# Patient Record
Sex: MALE | Race: WHITE | NOT HISPANIC OR LATINO | Employment: UNEMPLOYED | URBAN - METROPOLITAN AREA
[De-identification: names, ages, dates, MRNs, and addresses within clinical notes are randomized per-mention and may not be internally consistent; named-entity substitution may affect disease eponyms.]

---

## 2017-10-16 ENCOUNTER — HOSPITAL ENCOUNTER (EMERGENCY)
Facility: HOSPITAL | Age: 27
Discharge: HOME | End: 2017-10-16
Attending: EMERGENCY MEDICINE
Payer: MEDICAID

## 2017-10-16 VITALS
DIASTOLIC BLOOD PRESSURE: 71 MMHG | SYSTOLIC BLOOD PRESSURE: 126 MMHG | RESPIRATION RATE: 18 BRPM | HEART RATE: 88 BPM | OXYGEN SATURATION: 98 % | BODY MASS INDEX: 20.82 KG/M2 | WEIGHT: 141 LBS | TEMPERATURE: 97 F

## 2017-10-16 DIAGNOSIS — K08.89 TOOTHACHE: Primary | ICD-10-CM

## 2017-10-16 DIAGNOSIS — K04.7 DENTAL ABSCESS: ICD-10-CM

## 2017-10-16 PROCEDURE — 99283 EMERGENCY DEPT VISIT LOW MDM: CPT

## 2017-10-16 RX ORDER — NAPROXEN 375 MG/1
375 TABLET ORAL 2 TIMES DAILY WITH MEALS
Qty: 30 TABLET | Refills: 0 | Status: SHIPPED | OUTPATIENT
Start: 2017-10-16 | End: 2017-12-26

## 2017-10-16 RX ORDER — CLINDAMYCIN HYDROCHLORIDE 150 MG/1
450 CAPSULE ORAL EVERY 8 HOURS
Qty: 45 CAPSULE | Refills: 0 | Status: SHIPPED | OUTPATIENT
Start: 2017-10-16 | End: 2017-10-21

## 2017-10-16 NOTE — ED PROVIDER NOTES
"Encounter Date: 10/16/2017       History     Chief Complaint   Patient presents with    Dental Pain     bottom left "abscess" reported per patient, pt reports "i need some antibiotics and this dentist office told me to come to the emergency room because this is the only place he can get antibiotics today"      The history is provided by the patient.   Dental Pain   The primary symptoms include mouth pain. Primary symptoms do not include oral bleeding, fever, shortness of breath, sore throat or angioedema. The symptoms began several weeks ago. The symptoms are unchanged. The symptoms are chronic. The symptoms occur constantly.   Affected locations include: teeth and gum(s).   Additional symptoms include: gum swelling. Additional symptoms do not include: trismus and facial swelling.     Review of patient's allergies indicates:   Allergen Reactions    Ceclor [cefaclor]     Cefixime     Cefzil [cefprozil]      Past Medical History:   Diagnosis Date    Asthma      History reviewed. No pertinent surgical history.  History reviewed. No pertinent family history.  Social History   Substance Use Topics    Smoking status: Current Every Day Smoker     Packs/day: 1.00     Types: Cigarettes    Smokeless tobacco: Never Used    Alcohol use Yes      Comment: seldom     Review of Systems   Constitutional: Negative for fever.   HENT: Negative for facial swelling and sore throat.    Respiratory: Negative for shortness of breath.    Cardiovascular: Negative for chest pain.   Gastrointestinal: Negative for nausea.   Genitourinary: Negative for dysuria.   Musculoskeletal: Negative for back pain.   Skin: Negative for rash.   Neurological: Negative for weakness.   Hematological: Does not bruise/bleed easily.       Physical Exam     Initial Vitals [10/16/17 1426]   BP Pulse Resp Temp SpO2   126/71 88 18 97.4 °F (36.3 °C) 98 %      MAP       89.33         Physical Exam    Nursing note and vitals reviewed.  Constitutional: He appears " well-developed and well-nourished. No distress.   HENT:   Head: Normocephalic and atraumatic.   Mouth/Throat: Oropharynx is clear and moist. Dental abscesses and dental caries present.       Multiple missing teeth, poor dentition.    Eyes: Conjunctivae and EOM are normal. Pupils are equal, round, and reactive to light.   Neck: Normal range of motion. Neck supple.   Cardiovascular: Normal rate, regular rhythm and normal heart sounds. Exam reveals no gallop and no friction rub.    No murmur heard.  Pulmonary/Chest: Breath sounds normal. No respiratory distress. He has no wheezes. He has no rhonchi. He has no rales.   Abdominal: Soft. Bowel sounds are normal. He exhibits no distension and no mass. There is no tenderness. There is no rebound and no guarding.   Musculoskeletal: Normal range of motion. He exhibits no edema.   Neurological: He is alert and oriented to person, place, and time. He has normal strength.   Skin: Skin is warm and dry. No rash noted.   Psychiatric: He has a normal mood and affect. Thought content normal.         ED Course   Procedures  Labs Reviewed - No data to display                            ED Course      Clinical Impression:       ICD-10-CM ICD-9-CM   1. Toothache K08.89 525.9   2. Dental abscess K04.7 522.5         Disposition:   Disposition: Discharged  Condition: Stable                        Je Foote MD  10/16/17 9202

## 2017-11-09 ENCOUNTER — HOSPITAL ENCOUNTER (EMERGENCY)
Facility: HOSPITAL | Age: 27
Discharge: HOME | End: 2017-11-09
Payer: MEDICAID

## 2017-11-09 VITALS
RESPIRATION RATE: 18 BRPM | DIASTOLIC BLOOD PRESSURE: 71 MMHG | HEART RATE: 76 BPM | HEIGHT: 68 IN | SYSTOLIC BLOOD PRESSURE: 129 MMHG | OXYGEN SATURATION: 99 % | BODY MASS INDEX: 21.64 KG/M2 | WEIGHT: 142.81 LBS | TEMPERATURE: 98 F

## 2017-11-09 DIAGNOSIS — L08.9 SKIN INFECTION: ICD-10-CM

## 2017-11-09 DIAGNOSIS — L73.9 FOLLICULITIS: Primary | ICD-10-CM

## 2017-11-09 PROCEDURE — 99283 EMERGENCY DEPT VISIT LOW MDM: CPT

## 2017-11-09 RX ORDER — MUPIROCIN 20 MG/G
OINTMENT TOPICAL 3 TIMES DAILY
Qty: 1 TUBE | Refills: 0 | Status: SHIPPED | OUTPATIENT
Start: 2017-11-09 | End: 2017-12-26

## 2017-11-09 NOTE — ED PROVIDER NOTES
Encounter Date: 11/9/2017       History     Chief Complaint   Patient presents with    Abscess     pt reports 3 abscess that needs to be checked, right arm, 2 abdominal, all with ss drainage and open approx 3 days PTA,     Pt presents to ED complaining of abscesses to abdomen and R forearm which onset gradually over the past 2 weeks. Pt states that he was painting and sweating all day and that night he noticed bumps on his skin. He states that the areas look better but wants and antibiotic to get it out of his system. Symptoms are constant and moderate in severity. No mitigating or exacerbating factors reported. Associated sxs include skin irritation and draining, which have improved. Patient denies any fever, and all other sxs at this time. No further complaints or concerns at this time.             Review of patient's allergies indicates:   Allergen Reactions    Ceclor [cefaclor]     Cefixime     Cefzil [cefprozil]      Past Medical History:   Diagnosis Date    Asthma      History reviewed. No pertinent surgical history.  History reviewed. No pertinent family history.  Social History   Substance Use Topics    Smoking status: Current Every Day Smoker     Packs/day: 1.00     Types: Cigarettes    Smokeless tobacco: Former User    Alcohol use Yes      Comment: seldom     Review of Systems   Constitutional: Negative for fever.   HENT: Negative for sore throat.    Respiratory: Negative for shortness of breath.    Cardiovascular: Negative for chest pain.   Gastrointestinal: Negative for nausea.   Genitourinary: Negative for dysuria.   Musculoskeletal: Negative for back pain.   Skin: Negative for rash.        Positive for multiple abscesses    Neurological: Negative for weakness.   Hematological: Does not bruise/bleed easily.   All other systems reviewed and are negative.      Physical Exam     Initial Vitals [11/09/17 1119]   BP Pulse Resp Temp SpO2   129/71 76 18 98.4 °F (36.9 °C) 99 %      MAP       90.33          Physical Exam  Vital signs and nursing notes reviewed.  Constitutional: Patient is in no acute distress. Awake and alert. Well-developed and well-nourished.  Head: Atraumatic. Normocephalic.  Eyes: PERRL. EOM intact. Conjunctivae nl. No scleral icterus.  ENT: Mucous membranes are moist. Oropharynx is clear.  Neck: Supple. Full ROM. No lymphadenopathy.  Cardiovascular: Regular rate and rhythm. No murmurs, rubs, or gallops. Distal pulses are 2+ and symmetric .  Pulmonary/Chest: No respiratory distress. Clear to auscultation bilaterally. No wheezing, rales, or rhonchi.  Abdominal: Soft. Non-distended. No TTP. No rebound, guarding, or rigidity. Good bowel sounds.  Genitourinary: No CVA tenderness  Musculoskeletal: Moves all extremities. No edema. No calf tenderness.  Skin: Warm and dry. 2 small areas approx 1 cm of erythema and scaling to lower R abdomen, no induration or fluctuance and a 2.5 cm area or erythema with no iduration to R forearm, no fluctaunce  Neurological: Awake and alert. No acute focal neurological deficits are appreciated.  Psychiatric: Normal affect. Good eye contact. Appropriate in content.    ED Course   Procedures  Labs Reviewed - No data to display                            ED Course    12:12 PM:  Discussed with pt all pertinent ED information and results. Discussed pt dx of follicultis and plan of tx. Gave pt all f/u and return to the ED instructions. All questions and concerns were addressed at this time. Educated patient on the importance of using antibacterial soap when showering. Pt expresses understanding of information and instructions, and is comfortable with plan to discharge. Pt is stable for discharge.      Clinical Impression:   The primary encounter diagnosis was Folliculitis. A diagnosis of Skin infection was also pertinent to this visit.                           Arpit Lau Jr., FNP  11/09/17 8921

## 2017-12-26 ENCOUNTER — HOSPITAL ENCOUNTER (EMERGENCY)
Facility: HOSPITAL | Age: 27
Discharge: HOME | End: 2017-12-26
Attending: EMERGENCY MEDICINE
Payer: MEDICAID

## 2017-12-26 VITALS
SYSTOLIC BLOOD PRESSURE: 135 MMHG | RESPIRATION RATE: 16 BRPM | BODY MASS INDEX: 22.81 KG/M2 | OXYGEN SATURATION: 98 % | HEART RATE: 70 BPM | WEIGHT: 154 LBS | DIASTOLIC BLOOD PRESSURE: 63 MMHG | HEIGHT: 69 IN | TEMPERATURE: 99 F

## 2017-12-26 DIAGNOSIS — K08.89 PAIN, DENTAL: Primary | ICD-10-CM

## 2017-12-26 PROCEDURE — 99283 EMERGENCY DEPT VISIT LOW MDM: CPT

## 2017-12-26 RX ORDER — NAPROXEN 500 MG/1
500 TABLET ORAL 2 TIMES DAILY WITH MEALS
Qty: 20 TABLET | Refills: 0 | Status: SHIPPED | OUTPATIENT
Start: 2017-12-26 | End: 2019-06-18

## 2017-12-26 RX ORDER — TRAMADOL HYDROCHLORIDE 50 MG/1
50 TABLET ORAL EVERY 6 HOURS PRN
Qty: 12 TABLET | Refills: 0 | Status: SHIPPED | OUTPATIENT
Start: 2017-12-26 | End: 2019-06-18

## 2017-12-26 RX ORDER — CLINDAMYCIN HYDROCHLORIDE 150 MG/1
300 CAPSULE ORAL 4 TIMES DAILY
Qty: 56 CAPSULE | Refills: 0 | Status: SHIPPED | OUTPATIENT
Start: 2017-12-26 | End: 2018-01-02

## 2017-12-28 NOTE — ED PROVIDER NOTES
"Encounter Date: 12/26/2017       History     Chief Complaint   Patient presents with    Dental Problem     Pt states, "I have an abscess in my jaw line that I need to get antibiotics for." L Lower jaw     The history is provided by the patient.   Dental Pain   The primary symptoms include mouth pain and dental injury. Primary symptoms do not include oral bleeding, oral lesions, headaches, fever, shortness of breath, sore throat or angioedema. The symptoms began several days ago. The symptoms are worsening. The symptoms are recurrent. The symptoms occur intermittently.   Mouth pain began 3 - 5 days ago. Mouth pain occurs intermittently.   Affected teeth include: 19/left lower first molar. The injury is a fracture.   Additional symptoms include: dental sensitivity to temperature, gum tenderness, jaw pain and facial swelling. Additional symptoms do not include: gum swelling, purulent gums, trismus, trouble swallowing, pain with swallowing, excessive salivation and drooling. Medical issues include: smoking.     Has f/u c dentist but pain getting worse and he cannot get to dentist today.      Review of patient's allergies indicates:   Allergen Reactions    Ceclor [cefaclor]     Cefixime     Cefzil [cefprozil]      Past Medical History:   Diagnosis Date    Asthma      Past Surgical History:   Procedure Laterality Date    NO PAST SURGERIES       History reviewed. No pertinent family history.  Social History   Substance Use Topics    Smoking status: Current Every Day Smoker     Packs/day: 1.00     Types: Cigarettes    Smokeless tobacco: Former User    Alcohol use Yes      Comment: seldom     Review of Systems   Constitutional: Negative for fever.   HENT: Positive for dental problem and facial swelling. Negative for drooling, sore throat and trouble swallowing.    Respiratory: Negative for shortness of breath.    Cardiovascular: Negative for chest pain.   Gastrointestinal: Negative for nausea.   Genitourinary: " Negative for dysuria.   Musculoskeletal: Negative for back pain.   Skin: Negative for rash.   Neurological: Negative for weakness and headaches.   Hematological: Does not bruise/bleed easily.   All other systems reviewed and are negative.      Physical Exam     Initial Vitals [12/26/17 1354]   BP Pulse Resp Temp SpO2   135/63 70 16 98.7 °F (37.1 °C) 98 %      MAP       87         Physical Exam    Nursing note and vitals reviewed.  Constitutional: He appears well-developed and well-nourished.   HENT:   Head: Normocephalic and atraumatic.       Mouth/Throat: Oropharynx is clear and moist. Dental abscesses and dental caries present. No uvula swelling.       Eyes: EOM are normal. Pupils are equal, round, and reactive to light.   Neck: Normal range of motion. Neck supple.   Cardiovascular: Normal rate, regular rhythm, normal heart sounds and intact distal pulses.   Pulmonary/Chest: Breath sounds normal. No respiratory distress. He has no wheezes. He has no rhonchi.   Abdominal: Soft. Bowel sounds are normal. There is no rebound.   Musculoskeletal: Normal range of motion. He exhibits no edema.   Neurological: He is alert and oriented to person, place, and time. He has normal strength. No cranial nerve deficit or sensory deficit.   Skin: Skin is warm and dry. No rash noted.   Psychiatric: He has a normal mood and affect. His behavior is normal. Judgment and thought content normal.         ED Course   Procedures  Labs Reviewed - No data to display                            ED Course      Clinical Impression:   The encounter diagnosis was Pain, dental.    Disposition:   Disposition: Discharged  Condition: Stable                        Clovis Bourne MD  12/28/17 6424

## 2019-06-18 ENCOUNTER — HOSPITAL ENCOUNTER (EMERGENCY)
Facility: HOSPITAL | Age: 29
Discharge: HOME | End: 2019-06-18
Attending: EMERGENCY MEDICINE
Payer: MEDICAID

## 2019-06-18 VITALS
TEMPERATURE: 101 F | HEART RATE: 94 BPM | SYSTOLIC BLOOD PRESSURE: 126 MMHG | RESPIRATION RATE: 16 BRPM | OXYGEN SATURATION: 100 % | DIASTOLIC BLOOD PRESSURE: 69 MMHG | WEIGHT: 160 LBS | HEIGHT: 69 IN | BODY MASS INDEX: 23.7 KG/M2

## 2019-06-18 DIAGNOSIS — R50.9 FEVER, UNSPECIFIED FEVER CAUSE: ICD-10-CM

## 2019-06-18 DIAGNOSIS — M62.830 BACK SPASM: ICD-10-CM

## 2019-06-18 DIAGNOSIS — R05.9 COUGH: Primary | ICD-10-CM

## 2019-06-18 PROCEDURE — 96372 THER/PROPH/DIAG INJ SC/IM: CPT | Mod: ER

## 2019-06-18 PROCEDURE — 63600175 PHARM REV CODE 636 W HCPCS: Mod: ER | Performed by: EMERGENCY MEDICINE

## 2019-06-18 PROCEDURE — 99284 EMERGENCY DEPT VISIT MOD MDM: CPT | Mod: 25,ER

## 2019-06-18 RX ORDER — ORPHENADRINE CITRATE 30 MG/ML
60 INJECTION INTRAMUSCULAR; INTRAVENOUS
Status: COMPLETED | OUTPATIENT
Start: 2019-06-18 | End: 2019-06-18

## 2019-06-18 RX ORDER — NAPROXEN 375 MG/1
375 TABLET ORAL 2 TIMES DAILY WITH MEALS
Qty: 30 TABLET | Refills: 0 | Status: SHIPPED | OUTPATIENT
Start: 2019-06-18 | End: 2019-07-15

## 2019-06-18 RX ORDER — CYCLOBENZAPRINE HCL 10 MG
10 TABLET ORAL 3 TIMES DAILY PRN
Qty: 15 TABLET | Refills: 0 | Status: SHIPPED | OUTPATIENT
Start: 2019-06-18 | End: 2019-06-23

## 2019-06-18 RX ORDER — KETOROLAC TROMETHAMINE 30 MG/ML
15 INJECTION, SOLUTION INTRAMUSCULAR; INTRAVENOUS
Status: COMPLETED | OUTPATIENT
Start: 2019-06-18 | End: 2019-06-18

## 2019-06-18 RX ADMIN — ORPHENADRINE CITRATE 60 MG: 30 INJECTION INTRAMUSCULAR; INTRAVENOUS at 07:06

## 2019-06-18 RX ADMIN — KETOROLAC TROMETHAMINE 15 MG: 30 INJECTION, SOLUTION INTRAMUSCULAR; INTRAVENOUS at 07:06

## 2019-06-18 NOTE — ED PROVIDER NOTES
Encounter Date: 6/18/2019       History     Chief Complaint   Patient presents with    Back Pain     The history is provided by the patient.   Back Pain    This is a new problem. The current episode started yesterday. The problem occurs constantly. The problem has been unchanged. The pain is associated with lifting heavy objects. The pain is present in the thoracic spine. The quality of the pain is described as stabbing. The pain does not radiate. The symptoms are aggravated by bending, twisting and certain positions. Associated symptoms include a fever. Pertinent negatives include no chest pain, no bladder incontinence, no dysuria, no paresthesias, no paresis, no tingling and no weakness. He has tried nothing for the symptoms.   Cough   The current episode started yesterday. The problem has been unchanged. The cough is productive of sputum. The maximum temperature recorded prior to his arrival was 101 - 101.9 F. Pertinent negatives include no chest pain, no sore throat and no shortness of breath.     Review of patient's allergies indicates:   Allergen Reactions    Ceclor [cefaclor]     Cefixime     Cefzil [cefprozil]      Past Medical History:   Diagnosis Date    Asthma      Past Surgical History:   Procedure Laterality Date    NO PAST SURGERIES       History reviewed. No pertinent family history.  Social History     Tobacco Use    Smoking status: Current Every Day Smoker     Packs/day: 1.00     Types: Cigarettes    Smokeless tobacco: Former User   Substance Use Topics    Alcohol use: Yes     Comment: seldom    Drug use: No     Review of Systems   Constitutional: Positive for fever.   HENT: Negative for sore throat.    Respiratory: Positive for cough. Negative for shortness of breath.    Cardiovascular: Negative for chest pain.   Gastrointestinal: Negative for nausea.   Genitourinary: Negative for bladder incontinence and dysuria.   Musculoskeletal: Positive for back pain.   Skin: Negative for rash.    Neurological: Negative for tingling, weakness and paresthesias.   Hematological: Does not bruise/bleed easily.       Physical Exam     Initial Vitals [06/18/19 0733]   BP Pulse Resp Temp SpO2   125/70 97 18 (!) 101 °F (38.3 °C) 99 %      MAP       --         Physical Exam    Nursing note and vitals reviewed.  Constitutional: He appears well-developed and well-nourished. No distress.   HENT:   Head: Normocephalic and atraumatic.   Mouth/Throat: Oropharynx is clear and moist.   Eyes: Conjunctivae and EOM are normal. Pupils are equal, round, and reactive to light.   Neck: Normal range of motion. Neck supple.   Cardiovascular: Normal rate, regular rhythm and normal heart sounds. Exam reveals no gallop and no friction rub.    No murmur heard.  Pulmonary/Chest: Breath sounds normal. No respiratory distress. He has no wheezes. He has no rhonchi. He has no rales.   Abdominal: Soft. Bowel sounds are normal. He exhibits no distension and no mass. There is no tenderness. There is no rebound and no guarding.   Musculoskeletal: Normal range of motion. He exhibits no edema.        Thoracic back: He exhibits spasm (Right paraspinal). He exhibits no tenderness and no bony tenderness.   Neurological: He is alert and oriented to person, place, and time. He has normal strength.   Skin: Skin is warm and dry. No rash noted.   Psychiatric: He has a normal mood and affect. Thought content normal.         ED Course   Procedures  Labs Reviewed - No data to display       Imaging Results          X-Ray Chest PA And Lateral (Final result)  Result time 06/18/19 08:21:13    Final result by Mc Cerrato MD (06/18/19 08:21:13)                 Impression:      No acute findings.      Electronically signed by: Mc Cerrato MD  Date:    06/18/2019  Time:    08:21             Narrative:    EXAMINATION:  XR CHEST PA AND LATERAL    CLINICAL HISTORY:  cough;    TECHNIQUE:  PA and lateral views of the chest were  performed.    COMPARISON:  None    FINDINGS:  The cardiac and mediastinal silhouettes appear within normal limits.   The lungs are clear bilaterally.  No acute osseous findings demonstrated.                                         8:30 AM: Reassessed pt at this time.  Pt states his condition has improved at this time. Pain has resolved.  C/T/L spine is nontender.  Discussed with pt all pertinent ED information and results. Discussed pt dx of cough, fever and plan of tx.  Gave pt all f/u and return to the ED instructions. All questions and concerns were addressed at this time. Pt expresses understanding of information and instructions, and is comfortable with plan to discharge. Pt is stable for discharge.                  Clinical Impression:       ICD-10-CM ICD-9-CM   1. Cough R05 786.2   2. Fever, unspecified fever cause R50.9 780.60   3. Back spasm M62.830 724.8           Disposition:   Disposition: Discharged  Condition: Stable                        Je Foote MD  06/18/19 0834

## 2019-07-05 ENCOUNTER — HOSPITAL ENCOUNTER (EMERGENCY)
Facility: HOSPITAL | Age: 29
Discharge: HOME | End: 2019-07-05
Attending: EMERGENCY MEDICINE
Payer: MEDICAID

## 2019-07-05 VITALS
BODY MASS INDEX: 23.02 KG/M2 | SYSTOLIC BLOOD PRESSURE: 145 MMHG | HEART RATE: 96 BPM | TEMPERATURE: 98 F | HEIGHT: 68 IN | RESPIRATION RATE: 19 BRPM | WEIGHT: 151.88 LBS | OXYGEN SATURATION: 98 % | DIASTOLIC BLOOD PRESSURE: 77 MMHG

## 2019-07-05 DIAGNOSIS — M54.6 ACUTE RIGHT-SIDED THORACIC BACK PAIN: Primary | ICD-10-CM

## 2019-07-05 PROCEDURE — 99284 EMERGENCY DEPT VISIT MOD MDM: CPT | Mod: 25,ER

## 2019-07-05 PROCEDURE — 63600175 PHARM REV CODE 636 W HCPCS: Mod: ER | Performed by: EMERGENCY MEDICINE

## 2019-07-05 PROCEDURE — 96372 THER/PROPH/DIAG INJ SC/IM: CPT | Mod: ER

## 2019-07-05 RX ORDER — METHOCARBAMOL 500 MG/1
1000 TABLET, FILM COATED ORAL 3 TIMES DAILY
Qty: 30 TABLET | Refills: 0 | Status: SHIPPED | OUTPATIENT
Start: 2019-07-05 | End: 2019-07-10

## 2019-07-05 RX ORDER — DEXAMETHASONE SODIUM PHOSPHATE 4 MG/ML
4 INJECTION, SOLUTION INTRA-ARTICULAR; INTRALESIONAL; INTRAMUSCULAR; INTRAVENOUS; SOFT TISSUE
Status: COMPLETED | OUTPATIENT
Start: 2019-07-05 | End: 2019-07-05

## 2019-07-05 RX ORDER — TRAMADOL HYDROCHLORIDE 50 MG/1
50 TABLET ORAL EVERY 6 HOURS PRN
Qty: 20 TABLET | Refills: 0 | Status: SHIPPED | OUTPATIENT
Start: 2019-07-05 | End: 2019-07-15

## 2019-07-05 RX ORDER — CYCLOBENZAPRINE HCL 10 MG
10 TABLET ORAL 3 TIMES DAILY PRN
COMMUNITY
End: 2019-07-15

## 2019-07-05 RX ADMIN — DEXAMETHASONE SODIUM PHOSPHATE 4 MG: 4 INJECTION, SOLUTION INTRAMUSCULAR; INTRAVENOUS at 10:07

## 2019-07-05 NOTE — ED PROVIDER NOTES
Encounter Date: 7/5/2019       History     Chief Complaint   Patient presents with    Back Pain     upper R got knocked over by dog. saw pcp 2 days ago for same.     The history is provided by the patient.   Back Pain    This is a recurrent problem. The current episode started several weeks ago. The problem occurs daily. The problem has been unchanged. The pain is associated with lifting heavy objects. The pain is present in the thoracic spine. The quality of the pain is described as aching. The pain does not radiate. The pain is at a severity of 4/10. Pertinent negatives include no chest pain, no fever, no numbness, no weight loss, no headaches, no abdominal pain, no abdominal swelling, no bowel incontinence, no perianal numbness, no bladder incontinence, no dysuria, no pelvic pain, no leg pain, no paresthesias, no paresis, no tingling and no weakness. He has tried nothing for the symptoms.     Review of patient's allergies indicates:   Allergen Reactions    Ceclor [cefaclor]     Cefixime     Cefzil [cefprozil]      Past Medical History:   Diagnosis Date    Asthma      Past Surgical History:   Procedure Laterality Date    NO PAST SURGERIES       History reviewed. No pertinent family history.  Social History     Tobacco Use    Smoking status: Current Every Day Smoker     Packs/day: 1.00     Types: Cigarettes    Smokeless tobacco: Former User   Substance Use Topics    Alcohol use: Yes     Comment: seldom    Drug use: No     Review of Systems   Constitutional: Negative for fever and weight loss.   Cardiovascular: Negative for chest pain.   Gastrointestinal: Negative for abdominal pain and bowel incontinence.   Genitourinary: Negative for bladder incontinence, dysuria and pelvic pain.   Musculoskeletal: Positive for back pain.   Neurological: Negative for tingling, weakness, numbness, headaches and paresthesias.   All other systems reviewed and are negative.      Physical Exam     Initial Vitals [07/05/19  1005]   BP Pulse Resp Temp SpO2   (!) 145/77 96 19 98.1 °F (36.7 °C) 98 %      MAP       --         Physical Exam    Nursing note and vitals reviewed.  Constitutional: He appears well-developed and well-nourished.   HENT:   Head: Normocephalic and atraumatic.   Mouth/Throat: Oropharynx is clear and moist.   Eyes: Conjunctivae and EOM are normal. Pupils are equal, round, and reactive to light.   Neck: Normal range of motion. Neck supple.   Cardiovascular: Normal rate, regular rhythm and normal heart sounds.   Pulmonary/Chest: Breath sounds normal.   Abdominal: Soft. Bowel sounds are normal.   Musculoskeletal: Normal range of motion.        Cervical back: Normal.        Thoracic back: He exhibits tenderness, pain and spasm. He exhibits normal range of motion, no bony tenderness, no swelling, no edema, no deformity, no laceration and normal pulse.        Lumbar back: Normal.   Neurological: He is alert and oriented to person, place, and time. He has normal strength.   Skin: Skin is warm and dry.   Psychiatric: He has a normal mood and affect. Thought content normal.         ED Course   Procedures  Labs Reviewed - No data to display       Imaging Results    None     10:16 AM - Counseling: Spoke with the patient and discussed todays findings, in addition to providing specific details for the plan of care and counseling regarding the diagnosis and prognosis. Questions are answered at this time.                               Clinical Impression:       ICD-10-CM ICD-9-CM   1. Acute right-sided thoracic back pain M54.6 724.1         Disposition:   Disposition: Discharged  Condition: Stable                        Richie Lester MD  07/05/19 1018

## 2019-07-15 ENCOUNTER — HOSPITAL ENCOUNTER (OUTPATIENT)
Dept: RADIOLOGY | Facility: HOSPITAL | Age: 29
Discharge: HOME | DRG: 853 | End: 2019-07-15
Attending: NURSE PRACTITIONER
Payer: MEDICAID

## 2019-07-15 ENCOUNTER — HOSPITAL ENCOUNTER (INPATIENT)
Facility: HOSPITAL | Age: 29
LOS: 10 days | Discharge: NURSING, REHAB OR LONG-TERM CARE | DRG: 853 | End: 2019-07-25
Attending: EMERGENCY MEDICINE | Admitting: INTERNAL MEDICINE
Payer: MEDICAID

## 2019-07-15 DIAGNOSIS — M54.2 CERVICALGIA: ICD-10-CM

## 2019-07-15 DIAGNOSIS — S16.1XXS CERVICAL STRAIN, SEQUELA: ICD-10-CM

## 2019-07-15 DIAGNOSIS — M86.8X8: ICD-10-CM

## 2019-07-15 DIAGNOSIS — M54.9 DORSALGIA: ICD-10-CM

## 2019-07-15 DIAGNOSIS — G95.20 CERVICAL SPINAL CORD COMPRESSION: ICD-10-CM

## 2019-07-15 DIAGNOSIS — M46.40 SPONDYLODISCITIS: Primary | ICD-10-CM

## 2019-07-15 DIAGNOSIS — F19.90 IV DRUG USER: ICD-10-CM

## 2019-07-15 DIAGNOSIS — M46.42 DISCITIS OF CERVICAL REGION: ICD-10-CM

## 2019-07-15 DIAGNOSIS — M54.2 NECK PAIN: ICD-10-CM

## 2019-07-15 DIAGNOSIS — Z01.818 PREOP TESTING: ICD-10-CM

## 2019-07-15 DIAGNOSIS — M54.2 CERVICALGIA: Primary | ICD-10-CM

## 2019-07-15 PROBLEM — E87.6 HYPOKALEMIA: Status: ACTIVE | Noted: 2019-07-15

## 2019-07-15 PROBLEM — A41.9 SEPSIS: Status: ACTIVE | Noted: 2019-07-15

## 2019-07-15 PROBLEM — R03.0 ELEVATED BP WITHOUT DIAGNOSIS OF HYPERTENSION: Status: ACTIVE | Noted: 2019-07-15

## 2019-07-15 LAB
ALBUMIN SERPL BCP-MCNC: 3.2 G/DL (ref 3.5–5.2)
ALP SERPL-CCNC: 60 U/L (ref 55–135)
ALT SERPL W/O P-5'-P-CCNC: 9 U/L (ref 10–44)
AMPHET+METHAMPHET UR QL: NEGATIVE
ANION GAP SERPL CALC-SCNC: 11 MMOL/L (ref 8–16)
AST SERPL-CCNC: 10 U/L (ref 10–40)
BARBITURATES UR QL SCN>200 NG/ML: NEGATIVE
BASOPHILS # BLD AUTO: 0.02 K/UL (ref 0–0.2)
BASOPHILS NFR BLD: 0.1 % (ref 0–1.9)
BENZODIAZ UR QL SCN>200 NG/ML: NEGATIVE
BILIRUB SERPL-MCNC: 0.3 MG/DL (ref 0.1–1)
BILIRUB UR QL STRIP: NEGATIVE
BUN SERPL-MCNC: 11 MG/DL (ref 6–20)
BZE UR QL SCN: NEGATIVE
CALCIUM SERPL-MCNC: 8.9 MG/DL (ref 8.7–10.5)
CANNABINOIDS UR QL SCN: NORMAL
CHLORIDE SERPL-SCNC: 103 MMOL/L (ref 95–110)
CLARITY UR REFRACT.AUTO: CLEAR
CO2 SERPL-SCNC: 25 MMOL/L (ref 23–29)
COLOR UR AUTO: YELLOW
CREAT SERPL-MCNC: 0.8 MG/DL (ref 0.5–1.4)
CREAT UR-MCNC: 101.9 MG/DL (ref 23–375)
CRP SERPL-MCNC: 102 MG/L (ref 0–8.2)
DIFFERENTIAL METHOD: ABNORMAL
EOSINOPHIL # BLD AUTO: 0.4 K/UL (ref 0–0.5)
EOSINOPHIL NFR BLD: 2.6 % (ref 0–8)
ERYTHROCYTE [DISTWIDTH] IN BLOOD BY AUTOMATED COUNT: 12.5 % (ref 11.5–14.5)
EST. GFR  (AFRICAN AMERICAN): >60 ML/MIN/1.73 M^2
EST. GFR  (NON AFRICAN AMERICAN): >60 ML/MIN/1.73 M^2
GLUCOSE SERPL-MCNC: 129 MG/DL (ref 70–110)
GLUCOSE UR QL STRIP: NEGATIVE
HCT VFR BLD AUTO: 39 % (ref 40–54)
HGB BLD-MCNC: 12.7 G/DL (ref 14–18)
HGB UR QL STRIP: ABNORMAL
KETONES UR QL STRIP: NEGATIVE
LEUKOCYTE ESTERASE UR QL STRIP: NEGATIVE
LYMPHOCYTES # BLD AUTO: 2.6 K/UL (ref 1–4.8)
LYMPHOCYTES NFR BLD: 18.2 % (ref 18–48)
MCH RBC QN AUTO: 28.2 PG (ref 27–31)
MCHC RBC AUTO-ENTMCNC: 32.6 G/DL (ref 32–36)
MCV RBC AUTO: 87 FL (ref 82–98)
METHADONE UR QL SCN>300 NG/ML: NEGATIVE
MONOCYTES # BLD AUTO: 0.9 K/UL (ref 0.3–1)
MONOCYTES NFR BLD: 5.9 % (ref 4–15)
NEUTROPHILS # BLD AUTO: 10.5 K/UL (ref 1.8–7.7)
NEUTROPHILS NFR BLD: 73.2 % (ref 38–73)
NITRITE UR QL STRIP: NEGATIVE
OPIATES UR QL SCN: NORMAL
PCP UR QL SCN>25 NG/ML: NEGATIVE
PH UR STRIP: 6 [PH] (ref 5–8)
PLATELET # BLD AUTO: 300 K/UL (ref 150–350)
PMV BLD AUTO: 9.7 FL (ref 9.2–12.9)
POTASSIUM SERPL-SCNC: 3.4 MMOL/L (ref 3.5–5.1)
PROT SERPL-MCNC: 7.7 G/DL (ref 6–8.4)
PROT UR QL STRIP: NEGATIVE
RBC # BLD AUTO: 4.5 M/UL (ref 4.6–6.2)
SODIUM SERPL-SCNC: 139 MMOL/L (ref 136–145)
SP GR UR STRIP: 1.01 (ref 1–1.03)
TOXICOLOGY INFORMATION: NORMAL
URN SPEC COLLECT METH UR: ABNORMAL
UROBILINOGEN UR STRIP-ACNC: NEGATIVE EU/DL
WBC # BLD AUTO: 14.48 K/UL (ref 3.9–12.7)

## 2019-07-15 PROCEDURE — 80053 COMPREHEN METABOLIC PANEL: CPT | Mod: ER

## 2019-07-15 PROCEDURE — 72070 X-RAY EXAM THORAC SPINE 2VWS: CPT | Mod: TC,PO

## 2019-07-15 PROCEDURE — 63600175 PHARM REV CODE 636 W HCPCS: Mod: ER | Performed by: EMERGENCY MEDICINE

## 2019-07-15 PROCEDURE — 72040 X-RAY EXAM NECK SPINE 2-3 VW: CPT | Mod: 26,,, | Performed by: RADIOLOGY

## 2019-07-15 PROCEDURE — 86140 C-REACTIVE PROTEIN: CPT | Mod: ER

## 2019-07-15 PROCEDURE — 25000003 PHARM REV CODE 250: Mod: ER | Performed by: EMERGENCY MEDICINE

## 2019-07-15 PROCEDURE — 87040 BLOOD CULTURE FOR BACTERIA: CPT

## 2019-07-15 PROCEDURE — 72070 X-RAY EXAM THORAC SPINE 2VWS: CPT | Mod: 26,,, | Performed by: RADIOLOGY

## 2019-07-15 PROCEDURE — 99285 EMERGENCY DEPT VISIT HI MDM: CPT | Mod: 25,ER

## 2019-07-15 PROCEDURE — 72040 XR CERVICAL SPINE AP LATERAL: ICD-10-PCS | Mod: 26,,, | Performed by: RADIOLOGY

## 2019-07-15 PROCEDURE — 72040 X-RAY EXAM NECK SPINE 2-3 VW: CPT | Mod: TC,PO

## 2019-07-15 PROCEDURE — 80307 DRUG TEST PRSMV CHEM ANLYZR: CPT | Mod: ER

## 2019-07-15 PROCEDURE — 63600175 PHARM REV CODE 636 W HCPCS: Performed by: NURSE PRACTITIONER

## 2019-07-15 PROCEDURE — 72070 XR THORACIC SPINE AP LATERAL: ICD-10-PCS | Mod: 26,,, | Performed by: RADIOLOGY

## 2019-07-15 PROCEDURE — 81003 URINALYSIS AUTO W/O SCOPE: CPT | Mod: ER,59

## 2019-07-15 PROCEDURE — 85025 COMPLETE CBC W/AUTO DIFF WBC: CPT | Mod: ER

## 2019-07-15 PROCEDURE — 11000001 HC ACUTE MED/SURG PRIVATE ROOM

## 2019-07-15 PROCEDURE — 85652 RBC SED RATE AUTOMATED: CPT

## 2019-07-15 PROCEDURE — 25000003 PHARM REV CODE 250: Performed by: NURSE PRACTITIONER

## 2019-07-15 RX ORDER — VANCOMYCIN HCL IN 5 % DEXTROSE 1G/250ML
1000 PLASTIC BAG, INJECTION (ML) INTRAVENOUS
Status: COMPLETED | OUTPATIENT
Start: 2019-07-15 | End: 2019-07-15

## 2019-07-15 RX ORDER — FAMOTIDINE 20 MG/1
20 TABLET, FILM COATED ORAL 2 TIMES DAILY
Status: DISCONTINUED | OUTPATIENT
Start: 2019-07-15 | End: 2019-07-25 | Stop reason: HOSPADM

## 2019-07-15 RX ORDER — ACETAMINOPHEN 500 MG
1000 TABLET ORAL
Status: COMPLETED | OUTPATIENT
Start: 2019-07-15 | End: 2019-07-15

## 2019-07-15 RX ORDER — IBUPROFEN 200 MG
600 TABLET ORAL
Status: COMPLETED | OUTPATIENT
Start: 2019-07-15 | End: 2019-07-15

## 2019-07-15 RX ORDER — DEXAMETHASONE SODIUM PHOSPHATE 4 MG/ML
4 INJECTION, SOLUTION INTRA-ARTICULAR; INTRALESIONAL; INTRAMUSCULAR; INTRAVENOUS; SOFT TISSUE EVERY 6 HOURS
Status: DISCONTINUED | OUTPATIENT
Start: 2019-07-15 | End: 2019-07-23

## 2019-07-15 RX ORDER — VANCOMYCIN HCL IN 5 % DEXTROSE 1G/250ML
1000 PLASTIC BAG, INJECTION (ML) INTRAVENOUS
Status: DISCONTINUED | OUTPATIENT
Start: 2019-07-16 | End: 2019-07-17

## 2019-07-15 RX ORDER — VANCOMYCIN HCL IN 5 % DEXTROSE 1G/250ML
1000 PLASTIC BAG, INJECTION (ML) INTRAVENOUS
Status: DISCONTINUED | OUTPATIENT
Start: 2019-07-15 | End: 2019-07-15

## 2019-07-15 RX ORDER — PANTOPRAZOLE SODIUM 40 MG/1
40 TABLET, DELAYED RELEASE ORAL DAILY
Status: DISCONTINUED | OUTPATIENT
Start: 2019-07-16 | End: 2019-07-15

## 2019-07-15 RX ORDER — TIZANIDINE 4 MG/1
TABLET ORAL
Refills: 0 | COMMUNITY
Start: 2019-07-03 | End: 2019-07-15

## 2019-07-15 RX ORDER — IBUPROFEN 800 MG/1
TABLET ORAL
Refills: 0 | COMMUNITY
Start: 2019-07-03 | End: 2019-07-15

## 2019-07-15 RX ORDER — MEROPENEM AND SODIUM CHLORIDE 500 MG/50ML
500 INJECTION, SOLUTION INTRAVENOUS
Status: DISCONTINUED | OUTPATIENT
Start: 2019-07-15 | End: 2019-07-16

## 2019-07-15 RX ADMIN — MEROPENEM AND SODIUM CHLORIDE 500 MG: 500 INJECTION, SOLUTION INTRAVENOUS at 11:07

## 2019-07-15 RX ADMIN — FAMOTIDINE 20 MG: 20 TABLET ORAL at 11:07

## 2019-07-15 RX ADMIN — VANCOMYCIN HYDROCHLORIDE 1000 MG: 1 INJECTION, POWDER, LYOPHILIZED, FOR SOLUTION INTRAVENOUS at 06:07

## 2019-07-15 RX ADMIN — ACETAMINOPHEN 1000 MG: 500 TABLET ORAL at 06:07

## 2019-07-15 RX ADMIN — IBUPROFEN 600 MG: 200 TABLET, FILM COATED ORAL at 07:07

## 2019-07-15 RX ADMIN — VANCOMYCIN HYDROCHLORIDE 1000 MG: 1 INJECTION, POWDER, LYOPHILIZED, FOR SOLUTION INTRAVENOUS at 04:07

## 2019-07-15 NOTE — ED PROVIDER NOTES
Encounter Date: 7/15/2019       History     Chief Complaint   Patient presents with    Back Pain     upper back pain, sent here from [facility] for abnormal xray.      The history is provided by the patient.   Neck Pain    This is a new problem. The current episode started several weeks ago. The problem occurs constantly. The problem has been gradually worsening. The pain is associated with a recent injury. The pain is present in the generalized neck. The quality of the pain is described as aching and shooting. The pain radiates to the left hand. The symptoms are aggravated by bending and twisting.     Review of patient's allergies indicates:   Allergen Reactions    Ceclor [cefaclor]     Cefixime     Cefzil [cefprozil]      Past Medical History:   Diagnosis Date    Asthma      Past Surgical History:   Procedure Laterality Date    NO PAST SURGERIES       History reviewed. No pertinent family history.  Social History     Tobacco Use    Smoking status: Current Every Day Smoker     Packs/day: 1.00     Types: Cigarettes    Smokeless tobacco: Former User   Substance Use Topics    Alcohol use: Yes     Comment: seldom    Drug use: No     Review of Systems   Musculoskeletal: Positive for neck pain.       Physical Exam     Initial Vitals [07/15/19 1410]   BP Pulse Resp Temp SpO2   (!) 164/79 106 18 98.3 °F (36.8 °C) 99 %      MAP       --         Physical Exam    Nursing note and vitals reviewed.  Constitutional: He appears well-developed and well-nourished. No distress.   HENT:   Head: Normocephalic and atraumatic.   Mouth/Throat: Oropharynx is clear and moist.   Eyes: Conjunctivae and EOM are normal. Pupils are equal, round, and reactive to light.   Neck: Normal range of motion. Neck supple.       Cardiovascular: Normal rate, regular rhythm and normal heart sounds. Exam reveals no gallop and no friction rub.    No murmur heard.  Pulmonary/Chest: Breath sounds normal. No respiratory distress. He has no wheezes.  He has no rhonchi. He has no rales.   Abdominal: Soft. Bowel sounds are normal. He exhibits no distension and no mass. There is no tenderness. There is no rebound and no guarding.   Musculoskeletal: Normal range of motion. He exhibits no edema.        Arms:  Neurological: He is alert and oriented to person, place, and time. He has normal strength.   Skin: Skin is warm and dry. No rash noted.   Psychiatric: He has a normal mood and affect. Thought content normal.         ED Course   Procedures  Labs Reviewed   CBC W/ AUTO DIFFERENTIAL - Abnormal; Notable for the following components:       Result Value    WBC 14.48 (*)     RBC 4.50 (*)     Hemoglobin 12.7 (*)     Hematocrit 39.0 (*)     Gran # (ANC) 10.5 (*)     Gran% 73.2 (*)     All other components within normal limits   COMPREHENSIVE METABOLIC PANEL - Abnormal; Notable for the following components:    Potassium 3.4 (*)     Glucose 129 (*)     Albumin 3.2 (*)     ALT 9 (*)     All other components within normal limits   CULTURE, BLOOD   CULTURE, BLOOD   URINALYSIS, REFLEX TO URINE CULTURE   SEDIMENTATION RATE   C-REACTIVE PROTEIN   DRUG SCREEN PANEL, URINE EMERGENCY          Imaging Results          MRI Cervical Spine Without Contrast (Final result)  Result time 07/15/19 15:59:07    Final result by Paco Merritt Jr., MD (07/15/19 15:59:07)                 Impression:      1. 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  No definite cord edema at this time.  Postcontrast imaging may be obtained as needed.  2. Vertebral body osteomyelitis of C5, C6, C7, and T1.  3. Anterior paraspinal phlegmon/abscess extending from mid C2 to mid T1.  4. Foraminal stenosis at C4-C5, C5-C6, and C6-C7.      Electronically signed by: Paco Merritt Jr., MD  Date:    07/15/2019  Time:    15:59             Narrative:    EXAMINATION:  MRI CERVICAL SPINE WITHOUT CONTRAST    CLINICAL HISTORY:  Spine fracture,  traumatic, cervical;    TECHNIQUE:  MR Cervical spine without contrast. Sagittal T1, T2, STIR. Axial 3D, T2. Coronal T1.    COMPARISON:  None available.    FINDINGS:  There is erosive endplate change, complete disc space height loss, and wedge deformity of the C6 and C7 vertebral bodies with abnormal marrow signal most consistent with spondylodiscitis.  There is also abnormal marrow signal within the bodies of C5 and T1.  There is disc space abscess/phlegmon/retropulsion relating to wedge compression deformity at C6-C7 that posteriorly protrudes and flattens the thecal sac to 5 mm AP.  The ligamentum flavum/interspinous ligaments at C5-C6 are stretched taut posteriorly but are intact.  There is extensive anterior paraspinal swelling/edema possibly with central loculated abscess collections.  This extends for a craniocaudal distance of 12 cm from the level of mid C2 to mid T1.  Intervertebral disc levels are as follows:    C2-C3 disc: No significant disc pathology.  Normal facet joints.  No spinal canal or neural foraminal stenosis.    C3-C4 disc: No significant disc pathology.  Normal facet joints.  No spinal canal or neural foraminal stenosis.    C4-C5 disc: Mild disc space height loss.  Mild uncovertebral joint degeneration bilaterally.  The thecal sac measures 11 mm AP.  Mild bilateral foraminal stenosis.    C5-C6 disc: Disc space height loss with moderate/severe uncovertebral joint degeneration and hypertrophy bilaterally.  There is severe bilateral foraminal stenosis.  The thecal sac measures 9 mm AP.    C6-C7 disc: Level of spondylodiscitis with severe bilateral foraminal stenosis and severe thecal sac stenosis flattened to 5 mm.  Slightly increased T2 signal within the cord.    C7-T1 disc: Normal disc space height and normal facet joints.  Mild bilateral foraminal stenosis relating to phlegmonous material.  The thecal sac measures 12 mm.                               CT Cervical Spine Without Contrast (Final  result)  Result time 07/15/19 15:16:27    Final result by Murray Bowman MD (07/15/19 15:16:27)                 Impression:      Compression deformities are seen at C6 and C4.  Wedge compression fracture deformities are noted at C6 and C4.  Retropulsion at C6/7 producing narrowing of the central canal to 6-7 mm and moderate to severe left and mild-to-moderate right neural foraminal narrowing.    COMMUNICATION  This critical result was discovered/received at 15:05.  The critical information above was relayed directly by me by telephone to Dr. Foote on 07/15/2019 at 15:07.    All CT scans at this facility use dose modulation, iterative reconstruction, and/or weight base dosing when appropriate to reduce radiation dose to as low as reasonably achievable.      Electronically signed by: Murray Bowman MD  Date:    07/15/2019  Time:    15:16             Narrative:    EXAMINATION:  CT CERVICAL SPINE WITHOUT CONTRAST    CLINICAL HISTORY:  Spine fracture, traumatic, cervical;    TECHNIQUE:  1.25 mm axial noncontrast CT images were obtained through the cervical spine using soft tissue and bony algorithm so.  Sagittal coronal reformats were also submitted for interpretation.    COMPARISON:  07/15/2019    FINDINGS:  Compression deformities are seen at C6 and C4.  Wedge compression fracture deformities are noted at C6 and C4 which appear significantly comminuted..  Retropulsion at C6/7 producing narrowing of the central canal to 6-7 mm. Reversal of the normal cervical lordosis with epicenter at C6/7.  Moderate to severe left and mild-to-moderate right neural foraminal narrowing.  Posterior elements appear intact.  The vertebral body heights are otherwise normal.  Intervertebral disc space height is maintained at the remaining levels.  Moderate prevertebral soft tissue swelling within the lower cervical region measuring up to 2.7 cm.    No significant degenerative changes.  Please note that routine CT of the spine is limited  in its evaluation of extradural/epidural disease.  Lung apices are clear.                                     ED Vital Signs:  Vitals:    07/15/19 1409 07/15/19 1410   BP:  (!) 164/79   Pulse:  106   Resp:  18   Temp:  98.3 °F (36.8 °C)   TempSrc:  Oral   SpO2:  99%   Weight: 69.2 kg (152 lb 8.9 oz)          Abnormal Lab Results:  Labs Reviewed   CBC W/ AUTO DIFFERENTIAL - Abnormal; Notable for the following components:       Result Value    WBC 14.48 (*)     RBC 4.50 (*)     Hemoglobin 12.7 (*)     Hematocrit 39.0 (*)     Gran # (ANC) 10.5 (*)     Gran% 73.2 (*)     All other components within normal limits   COMPREHENSIVE METABOLIC PANEL - Abnormal; Notable for the following components:    Potassium 3.4 (*)     Glucose 129 (*)     Albumin 3.2 (*)     ALT 9 (*)     All other components within normal limits   CULTURE, BLOOD   CULTURE, BLOOD   URINALYSIS, REFLEX TO URINE CULTURE   SEDIMENTATION RATE   C-REACTIVE PROTEIN   DRUG SCREEN PANEL, URINE EMERGENCY          All Lab Results:  Results for orders placed or performed during the hospital encounter of 07/15/19   CBC auto differential   Result Value Ref Range    WBC 14.48 (H) 3.90 - 12.70 K/uL    RBC 4.50 (L) 4.60 - 6.20 M/uL    Hemoglobin 12.7 (L) 14.0 - 18.0 g/dL    Hematocrit 39.0 (L) 40.0 - 54.0 %    Mean Corpuscular Volume 87 82 - 98 fL    Mean Corpuscular Hemoglobin 28.2 27.0 - 31.0 pg    Mean Corpuscular Hemoglobin Conc 32.6 32.0 - 36.0 g/dL    RDW 12.5 11.5 - 14.5 %    Platelets 300 150 - 350 K/uL    MPV 9.7 9.2 - 12.9 fL    Gran # (ANC) 10.5 (H) 1.8 - 7.7 K/uL    Lymph # 2.6 1.0 - 4.8 K/uL    Mono # 0.9 0.3 - 1.0 K/uL    Eos # 0.4 0.0 - 0.5 K/uL    Baso # 0.02 0.00 - 0.20 K/uL    Gran% 73.2 (H) 38.0 - 73.0 %    Lymph% 18.2 18.0 - 48.0 %    Mono% 5.9 4.0 - 15.0 %    Eosinophil% 2.6 0.0 - 8.0 %    Basophil% 0.1 0.0 - 1.9 %    Differential Method Automated    Comprehensive metabolic panel   Result Value Ref Range    Sodium 139 136 - 145 mmol/L    Potassium 3.4  (L) 3.5 - 5.1 mmol/L    Chloride 103 95 - 110 mmol/L    CO2 25 23 - 29 mmol/L    Glucose 129 (H) 70 - 110 mg/dL    BUN, Bld 11 6 - 20 mg/dL    Creatinine 0.8 0.5 - 1.4 mg/dL    Calcium 8.9 8.7 - 10.5 mg/dL    Total Protein 7.7 6.0 - 8.4 g/dL    Albumin 3.2 (L) 3.5 - 5.2 g/dL    Total Bilirubin 0.3 0.1 - 1.0 mg/dL    Alkaline Phosphatase 60 55 - 135 U/L    AST 10 10 - 40 U/L    ALT 9 (L) 10 - 44 U/L    Anion Gap 11 8 - 16 mmol/L    eGFR if African American >60.0 >60 mL/min/1.73 m^2    eGFR if non African American >60.0 >60 mL/min/1.73 m^2           Imaging Results:  Imaging Results          MRI Cervical Spine Without Contrast (Final result)  Result time 07/15/19 15:59:07    Final result by Paco Merritt Jr., MD (07/15/19 15:59:07)                 Impression:      1. 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  No definite cord edema at this time.  Postcontrast imaging may be obtained as needed.  2. Vertebral body osteomyelitis of C5, C6, C7, and T1.  3. Anterior paraspinal phlegmon/abscess extending from mid C2 to mid T1.  4. Foraminal stenosis at C4-C5, C5-C6, and C6-C7.      Electronically signed by: Paco Merritt Jr., MD  Date:    07/15/2019  Time:    15:59             Narrative:    EXAMINATION:  MRI CERVICAL SPINE WITHOUT CONTRAST    CLINICAL HISTORY:  Spine fracture, traumatic, cervical;    TECHNIQUE:  MR Cervical spine without contrast. Sagittal T1, T2, STIR. Axial 3D, T2. Coronal T1.    COMPARISON:  None available.    FINDINGS:  There is erosive endplate change, complete disc space height loss, and wedge deformity of the C6 and C7 vertebral bodies with abnormal marrow signal most consistent with spondylodiscitis.  There is also abnormal marrow signal within the bodies of C5 and T1.  There is disc space abscess/phlegmon/retropulsion relating to wedge compression deformity at C6-C7 that posteriorly protrudes and flattens the  thecal sac to 5 mm AP.  The ligamentum flavum/interspinous ligaments at C5-C6 are stretched taut posteriorly but are intact.  There is extensive anterior paraspinal swelling/edema possibly with central loculated abscess collections.  This extends for a craniocaudal distance of 12 cm from the level of mid C2 to mid T1.  Intervertebral disc levels are as follows:    C2-C3 disc: No significant disc pathology.  Normal facet joints.  No spinal canal or neural foraminal stenosis.    C3-C4 disc: No significant disc pathology.  Normal facet joints.  No spinal canal or neural foraminal stenosis.    C4-C5 disc: Mild disc space height loss.  Mild uncovertebral joint degeneration bilaterally.  The thecal sac measures 11 mm AP.  Mild bilateral foraminal stenosis.    C5-C6 disc: Disc space height loss with moderate/severe uncovertebral joint degeneration and hypertrophy bilaterally.  There is severe bilateral foraminal stenosis.  The thecal sac measures 9 mm AP.    C6-C7 disc: Level of spondylodiscitis with severe bilateral foraminal stenosis and severe thecal sac stenosis flattened to 5 mm.  Slightly increased T2 signal within the cord.    C7-T1 disc: Normal disc space height and normal facet joints.  Mild bilateral foraminal stenosis relating to phlegmonous material.  The thecal sac measures 12 mm.                               CT Cervical Spine Without Contrast (Final result)  Result time 07/15/19 15:16:27    Final result by Murray Bowman MD (07/15/19 15:16:27)                 Impression:      Compression deformities are seen at C6 and C4.  Wedge compression fracture deformities are noted at C6 and C4.  Retropulsion at C6/7 producing narrowing of the central canal to 6-7 mm and moderate to severe left and mild-to-moderate right neural foraminal narrowing.    COMMUNICATION  This critical result was discovered/received at 15:05.  The critical information above was relayed directly by me by telephone to Dr. Foote on  07/15/2019 at 15:07.    All CT scans at this facility use dose modulation, iterative reconstruction, and/or weight base dosing when appropriate to reduce radiation dose to as low as reasonably achievable.      Electronically signed by: Murray Bowman MD  Date:    07/15/2019  Time:    15:16             Narrative:    EXAMINATION:  CT CERVICAL SPINE WITHOUT CONTRAST    CLINICAL HISTORY:  Spine fracture, traumatic, cervical;    TECHNIQUE:  1.25 mm axial noncontrast CT images were obtained through the cervical spine using soft tissue and bony algorithm so.  Sagittal coronal reformats were also submitted for interpretation.    COMPARISON:  07/15/2019    FINDINGS:  Compression deformities are seen at C6 and C4.  Wedge compression fracture deformities are noted at C6 and C4 which appear significantly comminuted..  Retropulsion at C6/7 producing narrowing of the central canal to 6-7 mm. Reversal of the normal cervical lordosis with epicenter at C6/7.  Moderate to severe left and mild-to-moderate right neural foraminal narrowing.  Posterior elements appear intact.  The vertebral body heights are otherwise normal.  Intervertebral disc space height is maintained at the remaining levels.  Moderate prevertebral soft tissue swelling within the lower cervical region measuring up to 2.7 cm.    No significant degenerative changes.  Please note that routine CT of the spine is limited in its evaluation of extradural/epidural disease.  Lung apices are clear.                                   The Emergency Provider reviewed the vital signs and test results, which are outlined above.    ED Discussions:  2:20 PM: Case discussed with Dr. Jeong, who recommends CT and MRI of cervical spine.      3:51 PM: Dr. Foote discussed the pt's case with Dr. Jeong (Neurosurgery) who recommends admission to Hospital medicine.      All historical, clinical, radiographic, and laboratory findings were reviewed with the patient/family in detail along with  the indications for transport to the facility in [place] in order to receive iv abx and neurosurgery consult.  All remaining questions and concerns were addressed at this time and the patient/family communicates understanding and agrees to proceed accordingly.  Similarly, all pertinent details of the encounter were discussed with Dr. Figueredo via Allyssa KEY who agrees to receive the patient at [facility] for further care as outlined above.  The patient will be transferred by [facility] ambulance services secondary to a need for ongoing neuromonitoring en route.  Je Foote MD  4:26 PM                          Clinical Impression:       ICD-10-CM ICD-9-CM   1. Spondylodiscitis M46.40 722.90   2. Neck pain M54.2 723.1           Disposition:   Disposition: Admitted  Condition: Fair                        Je Foote MD  07/15/19 4616

## 2019-07-15 NOTE — ED NOTES
Md updated pt on test results and poc. Pt verbalized that he is fine with being transferred to [facility] for admission.

## 2019-07-15 NOTE — PROGRESS NOTES
Pharmacokinetic Initial Assessment: IV Vancomycin    Assessment/Plan:    Initiate intravenous vancomycin with loading dose of 2000 mg once followed by a maintenance dose of vancomycin 1000mg IV every 12 hours  Desired empiric serum trough concentration is 10 to 20 mcg/mL.  Draw vancomycin trough level 30 min prior to fourth dose on 7/17 at approximately 0400   Pharmacy will continue to follow and monitor vancomycin.      Please contact pharmacy at extension [phone] with any questions regarding this assessment.     Thank you for the consult,   Deana Reese     Patient brief summary:  Alex Soares is a 28 y.o. male initiated on antimicrobial therapy with IV Vancomycin for treatment of suspected bone/joint    Drug Allergies:   Review of patient's allergies indicates:   Allergen Reactions    Ceclor [cefaclor]     Cefixime     Cefzil [cefprozil]        Actual Body Weight:   69.2kg    Renal Function:   Estimated Creatinine Clearance: 133.6 mL/min (based on SCr of 0.8 mg/dL).,     Dialysis Method (if applicable):  N/A     CBC (last 72 hours):  Recent Labs   Lab Result Units 07/15/19  1438   WBC K/uL 14.48*   Hemoglobin g/dL 12.7*   Hematocrit % 39.0*   Platelets K/uL 300   Gran% % 73.2*   Lymph% % 18.2   Mono% % 5.9   Eosinophil% % 2.6   Basophil% % 0.1   Differential Method  Automated       Metabolic Panel (last 72 hours):  Recent Labs   Lab Result Units 07/15/19  1438 07/15/19  1639 07/15/19  1640   Sodium mmol/L 139  --   --    Potassium mmol/L 3.4*  --   --    Chloride mmol/L 103  --   --    CO2 mmol/L 25  --   --    Glucose mg/dL 129*  --   --    Glucose, UA   --  Negative  --    BUN, Bld mg/dL 11  --   --    Creatinine mg/dL 0.8  --   --    Creatinine, Random Ur mg/dL  --   --  101.9   Albumin g/dL 3.2*  --   --    Total Bilirubin mg/dL 0.3  --   --    Alkaline Phosphatase U/L 60  --   --    AST U/L 10  --   --    ALT U/L 9*  --   --        Drug levels (last 3 results):  No results for input(s):  VANCOMYCINRA, VANCOMYCINPE, VANCOMYCINTR in the last 72 hours.    Microbiologic Results:  Microbiology Results (last 7 days)       Procedure Component Value Units Date/Time    Blood culture [[id number]] Collected:  07/15/19 1600    Order Status:  Sent Specimen:  Blood from Peripheral, Upper Arm, Left Updated:  07/15/19 1625    Blood culture [[id number]] Collected:  07/15/19 1619    Order Status:  Sent Specimen:  Blood from Peripheral, Upper Arm, Left Updated:  07/15/19 1624

## 2019-07-16 PROBLEM — G06.1 ABSCESS IN EPIDURAL SPACE OF CERVICAL SPINE: Status: ACTIVE | Noted: 2019-07-16

## 2019-07-16 LAB
ABO + RH BLD: NORMAL
ANION GAP SERPL CALC-SCNC: 12 MMOL/L (ref 8–16)
APTT BLDCRRT: 36 SEC (ref 21–32)
BASOPHILS # BLD AUTO: 0.01 K/UL (ref 0–0.2)
BASOPHILS NFR BLD: 0.1 % (ref 0–1.9)
BLD GP AB SCN CELLS X3 SERPL QL: NORMAL
BUN SERPL-MCNC: 10 MG/DL (ref 6–20)
CALCIUM SERPL-MCNC: 9.7 MG/DL (ref 8.7–10.5)
CHLORIDE SERPL-SCNC: 103 MMOL/L (ref 95–110)
CHOLEST SERPL-MCNC: 109 MG/DL (ref 120–199)
CHOLEST/HDLC SERPL: 4 {RATIO} (ref 2–5)
CO2 SERPL-SCNC: 25 MMOL/L (ref 23–29)
CREAT SERPL-MCNC: 0.8 MG/DL (ref 0.5–1.4)
DIASTOLIC DYSFUNCTION: NO
DIFFERENTIAL METHOD: ABNORMAL
EOSINOPHIL # BLD AUTO: 0.1 K/UL (ref 0–0.5)
EOSINOPHIL NFR BLD: 0.4 % (ref 0–8)
ERYTHROCYTE [DISTWIDTH] IN BLOOD BY AUTOMATED COUNT: 12.9 % (ref 11.5–14.5)
ERYTHROCYTE [SEDIMENTATION RATE] IN BLOOD BY WESTERGREN METHOD: 88 MM/HR (ref 0–23)
EST. GFR  (AFRICAN AMERICAN): >60 ML/MIN/1.73 M^2
EST. GFR  (NON AFRICAN AMERICAN): >60 ML/MIN/1.73 M^2
ESTIMATED AVG GLUCOSE: 111 MG/DL (ref 68–131)
ESTIMATED PA SYSTOLIC PRESSURE: 26.43
GLUCOSE SERPL-MCNC: 119 MG/DL (ref 70–110)
HBA1C MFR BLD HPLC: 5.5 % (ref 4–5.6)
HCT VFR BLD AUTO: 40.6 % (ref 40–54)
HDLC SERPL-MCNC: 27 MG/DL (ref 40–75)
HDLC SERPL: 24.8 % (ref 20–50)
HGB BLD-MCNC: 13.6 G/DL (ref 14–18)
INR PPP: 1 (ref 0.8–1.2)
LDLC SERPL CALC-MCNC: 68.2 MG/DL (ref 63–159)
LYMPHOCYTES # BLD AUTO: 1.4 K/UL (ref 1–4.8)
LYMPHOCYTES NFR BLD: 11.1 % (ref 18–48)
MAGNESIUM SERPL-MCNC: 2 MG/DL (ref 1.6–2.6)
MCH RBC QN AUTO: 28.8 PG (ref 27–31)
MCHC RBC AUTO-ENTMCNC: 33.5 G/DL (ref 32–36)
MCV RBC AUTO: 86 FL (ref 82–98)
MONOCYTES # BLD AUTO: 0.4 K/UL (ref 0.3–1)
MONOCYTES NFR BLD: 2.8 % (ref 4–15)
NEUTROPHILS # BLD AUTO: 10.7 K/UL (ref 1.8–7.7)
NEUTROPHILS NFR BLD: 86.1 % (ref 38–73)
NONHDLC SERPL-MCNC: 82 MG/DL
PHOSPHATE SERPL-MCNC: 3.2 MG/DL (ref 2.7–4.5)
PLATELET # BLD AUTO: 344 K/UL (ref 150–350)
PMV BLD AUTO: 9.3 FL (ref 9.2–12.9)
POTASSIUM SERPL-SCNC: 4.2 MMOL/L (ref 3.5–5.1)
PROTHROMBIN TIME: 11.1 SEC (ref 9–12.5)
RBC # BLD AUTO: 4.72 M/UL (ref 4.6–6.2)
RETIRED EF AND QEF - SEE NOTES: 55 (ref 55–65)
SODIUM SERPL-SCNC: 140 MMOL/L (ref 136–145)
TRIGL SERPL-MCNC: 69 MG/DL (ref 30–150)
TSH SERPL DL<=0.005 MIU/L-ACNC: 0.41 UIU/ML (ref 0.4–4)
WBC # BLD AUTO: 12.48 K/UL (ref 3.9–12.7)

## 2019-07-16 PROCEDURE — 93306 TTE W/DOPPLER COMPLETE: CPT | Mod: 26,,, | Performed by: INTERNAL MEDICINE

## 2019-07-16 PROCEDURE — A9585 GADOBUTROL INJECTION: HCPCS | Performed by: INTERNAL MEDICINE

## 2019-07-16 PROCEDURE — 84100 ASSAY OF PHOSPHORUS: CPT

## 2019-07-16 PROCEDURE — 80061 LIPID PANEL: CPT

## 2019-07-16 PROCEDURE — 93010 EKG 12-LEAD: ICD-10-PCS | Mod: ,,, | Performed by: INTERNAL MEDICINE

## 2019-07-16 PROCEDURE — 93041 RHYTHM ECG TRACING: CPT

## 2019-07-16 PROCEDURE — 25000003 PHARM REV CODE 250: Performed by: INTERNAL MEDICINE

## 2019-07-16 PROCEDURE — 85610 PROTHROMBIN TIME: CPT

## 2019-07-16 PROCEDURE — 93005 ELECTROCARDIOGRAM TRACING: CPT

## 2019-07-16 PROCEDURE — 83036 HEMOGLOBIN GLYCOSYLATED A1C: CPT

## 2019-07-16 PROCEDURE — 25500020 PHARM REV CODE 255: Performed by: INTERNAL MEDICINE

## 2019-07-16 PROCEDURE — 93010 ELECTROCARDIOGRAM REPORT: CPT | Mod: ,,, | Performed by: INTERNAL MEDICINE

## 2019-07-16 PROCEDURE — 25000003 PHARM REV CODE 250: Performed by: EMERGENCY MEDICINE

## 2019-07-16 PROCEDURE — 83735 ASSAY OF MAGNESIUM: CPT

## 2019-07-16 PROCEDURE — S0073 INJECTION, AZTREONAM, 500 MG: HCPCS | Performed by: INTERNAL MEDICINE

## 2019-07-16 PROCEDURE — 93306 TTE W/DOPPLER COMPLETE: CPT

## 2019-07-16 PROCEDURE — 25000003 PHARM REV CODE 250: Performed by: NURSE PRACTITIONER

## 2019-07-16 PROCEDURE — 85025 COMPLETE CBC W/AUTO DIFF WBC: CPT

## 2019-07-16 PROCEDURE — 36415 COLL VENOUS BLD VENIPUNCTURE: CPT

## 2019-07-16 PROCEDURE — 93306 2D ECHO WITH COLOR FLOW DOPPLER: ICD-10-PCS | Mod: 26,,, | Performed by: INTERNAL MEDICINE

## 2019-07-16 PROCEDURE — 86901 BLOOD TYPING SEROLOGIC RH(D): CPT

## 2019-07-16 PROCEDURE — S4991 NICOTINE PATCH NONLEGEND: HCPCS | Performed by: NURSE PRACTITIONER

## 2019-07-16 PROCEDURE — 11000001 HC ACUTE MED/SURG PRIVATE ROOM

## 2019-07-16 PROCEDURE — 84443 ASSAY THYROID STIM HORMONE: CPT

## 2019-07-16 PROCEDURE — 63600175 PHARM REV CODE 636 W HCPCS: Performed by: EMERGENCY MEDICINE

## 2019-07-16 PROCEDURE — 63600175 PHARM REV CODE 636 W HCPCS: Performed by: NURSE PRACTITIONER

## 2019-07-16 PROCEDURE — 80048 BASIC METABOLIC PNL TOTAL CA: CPT

## 2019-07-16 PROCEDURE — 85730 THROMBOPLASTIN TIME PARTIAL: CPT

## 2019-07-16 RX ORDER — IBUPROFEN 200 MG
1 TABLET ORAL DAILY
Status: DISCONTINUED | OUTPATIENT
Start: 2019-07-16 | End: 2019-07-25 | Stop reason: HOSPADM

## 2019-07-16 RX ORDER — IBUPROFEN 400 MG/1
400 TABLET ORAL EVERY 8 HOURS PRN
Status: DISCONTINUED | OUTPATIENT
Start: 2019-07-16 | End: 2019-07-25 | Stop reason: HOSPADM

## 2019-07-16 RX ORDER — GADOBUTROL 604.72 MG/ML
10 INJECTION INTRAVENOUS
Status: COMPLETED | OUTPATIENT
Start: 2019-07-16 | End: 2019-07-16

## 2019-07-16 RX ORDER — TRAMADOL HYDROCHLORIDE 50 MG/1
50 TABLET ORAL EVERY 8 HOURS PRN
Status: DISCONTINUED | OUTPATIENT
Start: 2019-07-16 | End: 2019-07-17

## 2019-07-16 RX ORDER — ACETAMINOPHEN 325 MG/1
650 TABLET ORAL EVERY 8 HOURS PRN
Status: DISCONTINUED | OUTPATIENT
Start: 2019-07-16 | End: 2019-07-25 | Stop reason: HOSPADM

## 2019-07-16 RX ORDER — RAMELTEON 8 MG/1
8 TABLET ORAL NIGHTLY PRN
Status: DISCONTINUED | OUTPATIENT
Start: 2019-07-16 | End: 2019-07-25 | Stop reason: HOSPADM

## 2019-07-16 RX ORDER — ONDANSETRON 2 MG/ML
4 INJECTION INTRAMUSCULAR; INTRAVENOUS EVERY 8 HOURS PRN
Status: DISCONTINUED | OUTPATIENT
Start: 2019-07-16 | End: 2019-07-25 | Stop reason: HOSPADM

## 2019-07-16 RX ADMIN — DEXAMETHASONE SODIUM PHOSPHATE 4 MG: 4 INJECTION, SOLUTION INTRA-ARTICULAR; INTRALESIONAL; INTRAMUSCULAR; INTRAVENOUS; SOFT TISSUE at 11:07

## 2019-07-16 RX ADMIN — MEROPENEM AND SODIUM CHLORIDE 500 MG: 500 INJECTION, SOLUTION INTRAVENOUS at 11:07

## 2019-07-16 RX ADMIN — FAMOTIDINE 20 MG: 20 TABLET ORAL at 08:07

## 2019-07-16 RX ADMIN — VANCOMYCIN HYDROCHLORIDE 1000 MG: 1 INJECTION, POWDER, LYOPHILIZED, FOR SOLUTION INTRAVENOUS at 04:07

## 2019-07-16 RX ADMIN — Medication 1 PATCH: at 12:07

## 2019-07-16 RX ADMIN — GADOBUTROL 6 ML: 604.72 INJECTION INTRAVENOUS at 09:07

## 2019-07-16 RX ADMIN — DEXAMETHASONE SODIUM PHOSPHATE 4 MG: 4 INJECTION, SOLUTION INTRA-ARTICULAR; INTRALESIONAL; INTRAMUSCULAR; INTRAVENOUS; SOFT TISSUE at 06:07

## 2019-07-16 RX ADMIN — Medication 1 PATCH: at 08:07

## 2019-07-16 RX ADMIN — AZTREONAM 1000 MG: 1 INJECTION, POWDER, LYOPHILIZED, FOR SOLUTION INTRAMUSCULAR; INTRAVENOUS at 06:07

## 2019-07-16 RX ADMIN — MEROPENEM AND SODIUM CHLORIDE 500 MG: 500 INJECTION, SOLUTION INTRAVENOUS at 05:07

## 2019-07-16 RX ADMIN — VANCOMYCIN HYDROCHLORIDE 1000 MG: 1 INJECTION, POWDER, LYOPHILIZED, FOR SOLUTION INTRAVENOUS at 03:07

## 2019-07-16 RX ADMIN — DEXAMETHASONE SODIUM PHOSPHATE 4 MG: 4 INJECTION, SOLUTION INTRA-ARTICULAR; INTRALESIONAL; INTRAMUSCULAR; INTRAVENOUS; SOFT TISSUE at 12:07

## 2019-07-16 RX ADMIN — DEXAMETHASONE SODIUM PHOSPHATE 4 MG: 4 INJECTION, SOLUTION INTRA-ARTICULAR; INTRALESIONAL; INTRAMUSCULAR; INTRAVENOUS; SOFT TISSUE at 05:07

## 2019-07-16 RX ADMIN — IOHEXOL 75 ML: 350 INJECTION, SOLUTION INTRAVENOUS at 02:07

## 2019-07-16 NOTE — ASSESSMENT & PLAN NOTE
Monitor consider daily therapy pending course  Further evaluation/diagnostics/interventions/consults pending course

## 2019-07-16 NOTE — PROGRESS NOTES
[facility] Hospital Medicine  Progress Note    Patient Name: Alex Soares  MRN: [id number]  Patient Class: IP- Inpatient   Admission Date: 7/15/2019  Length of Stay: 1 days  Attending Physician: Rony Garcia MD  Primary Care Provider: Simi Macdonald MD        Subjective:     Principal Problem:Cervical spinal cord compression      HPI:  28-year-old male with history of IV drug use, last use of heroin about 2 weeks ago, and tattoos, last recent tattoo 4 weeks ago; presents today as a transfer from outside freestanding ER for neck pain. Patient reports he has been having neck pain over the past month and has been seen outpatient but has not improved.  Patient reports playing with the dog earlier which exacerbated his pain and why he presented to the emergency room.  Patient was evaluated in the ER MRI performed and shows a cervical C6-7 with surrounding vertebral body osteomyelitis. Neurosurgery was consulted by ER.  Vancomycin was started.  Hospital Medicine consulted patient admitted.  Patient seen and examined on arrival.  Cervical collar in place on arrival.  Patient with allergies to cephalosporin reports hives in past.  Patient with increased risk of MRSA/MSSA will cover with both vancomycin and meropenem (given patient allergy).    Overview/Hospital Course:  28-year-old male with history of IV drug use, last use of heroin - a day prior to presentation .  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  No definite cord edema at this time.  Postcontrast imaging may be obtained as needed.  2. Vertebral body osteomyelitis of C5, C6, C7, and T1.  3. Anterior paraspinal phlegmon/abscess extending from mid C2 to mid T1    Interval History: 28-year-old male with history of IV drug use, last use of heroin - a day prior to presentation .  MRI of the cervical region- Spondylodiscitis at  C6-C7 with epidural abscess/phlegmon causing severe thecal sac stenosis to 5 mm AP with cord compression.  No definite cord edema at this time.  Postcontrast imaging may be obtained as needed.  2. Vertebral body osteomyelitis of C5, C6, C7, and T1.  3. Anterior paraspinal phlegmon/abscess extending from mid C2 to mid T1  Review of Systems   Constitutional: Negative for chills, diaphoresis, fatigue and fever.   HENT: Negative for congestion, sore throat and voice change.    Eyes: Negative for photophobia and visual disturbance.   Respiratory: Negative for cough, shortness of breath, wheezing and stridor.    Cardiovascular: Negative for chest pain and leg swelling.   Gastrointestinal: Negative for abdominal distention, abdominal pain, constipation, diarrhea, nausea and vomiting.   Endocrine: Negative for polydipsia, polyphagia and polyuria.   Genitourinary: Negative for difficulty urinating, dysuria, flank pain, testicular pain and urgency.   Musculoskeletal: Positive for back pain and neck pain. Negative for joint swelling and neck stiffness.   Skin: Negative for color change and rash.   Allergic/Immunologic: Negative for immunocompromised state.   Neurological: Negative for dizziness, syncope, weakness, numbness and headaches.   Hematological: Does not bruise/bleed easily.   Psychiatric/Behavioral: Negative for agitation, behavioral problems and confusion.     Objective:     Vital Signs (Most Recent):  Temp: 98.3 °F (36.8 °C) (07/16/19 1613)  Pulse: 99 (07/16/19 1613)  Resp: 18 (07/16/19 1613)  BP: (!) 152/86 (07/16/19 1613)  SpO2: 97 % (07/16/19 1613) Vital Signs (24h Range):  Temp:  [97.3 °F (36.3 °C)-101.2 °F (38.4 °C)] 98.3 °F (36.8 °C)  Pulse:  [] 99  Resp:  [13-20] 18  SpO2:  [95 %-100 %] 97 %  BP: (120-157)/(61-86) 152/86     Weight: 66.1 kg (145 lb 11.6 oz)  Body mass index is 22.09 kg/m².    Intake/Output Summary (Last 24 hours) at 7/16/2019 1801  Last data filed at 7/16/2019 0800  Gross per 24 hour    Intake 850 ml   Output --   Net 850 ml      Physical Exam   Constitutional: He is oriented to person, place, and time. He appears well-developed and well-nourished. No distress.   HENT:   Head: Normocephalic and atraumatic.   Nose: Nose normal.   Eyes: Pupils are equal, round, and reactive to light. Conjunctivae and EOM are normal. No scleral icterus.   Neck: Normal range of motion. Neck supple. No tracheal deviation present.   Cardiovascular: Normal rate, regular rhythm, normal heart sounds and intact distal pulses.   No murmur heard.  Pulmonary/Chest: Effort normal and breath sounds normal. No stridor. No respiratory distress. He has no wheezes. He has no rales.   Abdominal: Soft. Bowel sounds are normal. He exhibits no distension. There is no tenderness. There is no guarding.   Genitourinary:   Genitourinary Comments: ua neg  uds +   Musculoskeletal: Normal range of motion. He exhibits no edema or deformity.   Cervical collar in place  Unsteady gait  General ext weakness 4/5 to all 4.    Neurological: He is alert and oriented to person, place, and time. No cranial nerve deficit.   Skin: Skin is warm and dry. Capillary refill takes less than 2 seconds. No rash noted. He is not diaphoretic.   Psychiatric: He has a normal mood and affect. His behavior is normal. Judgment and thought content normal.   Nursing note and vitals reviewed.      Significant Labs:   Blood Culture:   Recent Labs   Lab 07/15/19  1600 07/15/19  1619   LABBLOO No Growth to date No Growth to date     BMP:   Recent Labs   Lab 07/16/19  0421   *      K 4.2      CO2 25   BUN 10   CREATININE 0.8   CALCIUM 9.7   MG 2.0     All pertinent labs within the past 24 hours have been reviewed.    Significant Imaging: I have reviewed and interpreted all pertinent imaging results/findings within the past 24 hours.      Assessment/Plan:      * Cervical spinal cord compression  Related to abscess  Neurosurgery consult in ED. Awaiting  recommendations.  Consider Steroid pending course but will await clarification of indication given active infection   Further evaluation/diagnostics/interventions/consults pending course       Abscess in epidural space of cervical spine    07/16- will do MRI of the spinal axis , discussed with Dr Jeong, will plan for operative repair      Sepsis  Related to above.   Continue vanc and meropenem  Id consult  Neurosurgery consult  Further evaluation/diagnostics/interventions/consults pending course         Elevated BP without diagnosis of hypertension  Monitor consider daily therapy pending course  Further evaluation/diagnostics/interventions/consults pending course         Hypokalemia  borderline  Monitor and supplement as need      IV drug user  Vancomycin and meropenem.  Consider addition of nafcillin pending course.  Id consult  Check echo  The patient was counseled on the dangers of use and encouraged to quit.  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  Patient does have some mild Neuro impairment with unsteady gait and generalized weakness all extremities on my exam and I have notified Neuro surgery at this time.  Awaiting their response/recommendations.  Patient was started on vancomycin in ED will increase coverage with meropenem.  Given his allergy and findings of osteo on MRI.  Consider addition of nafcillin to regimen pending course.    Will consult ID  Neuro checks  Further evaluation/diagnostics/interventions/consults pending course     07/16- will continue vancomycin / and use Aztreonam -will use cultures to guide therapy       Spondylodiscitis  Vanc/meropenum  Neurosurgery managing.   Cervical collar inplace  Further evaluation/diagnostics/interventions/consults pending  course     07/16- neurosurgery follow up for operative repair , continue Vancomycin, add aztreonam           VTE Risk Mitigation (From admission, onward)        Ordered     Place sequential compression device  Until discontinued      07/15/19 3292                Rony Garcia MD  Department of Hospital Medicine   [facility]

## 2019-07-16 NOTE — ASSESSMENT & PLAN NOTE
Vancomycin and meropenem.  Consider addition of nafcillin pending course.  Id consult  Check echo  The patient was counseled on the dangers of use and encouraged to quit.  Further evaluation/diagnostics/interventions/consults pending course

## 2019-07-16 NOTE — H&P
[facility] Hospital Medicine  History & Physical    Patient Name: Alex Soares  MRN: [id number]  Admission Date: 7/15/2019  Attending Physician: Lambert Meza MD  Primary Care Provider: Simi Macdonald MD         Patient information was obtained from patient, past medical records and ER records.     Subjective:     Principal Problem:Cervical spinal cord compression    Chief Complaint:   Chief Complaint   Patient presents with    Back Pain     upper back pain, sent here from [facility] for abnormal xray.         HPI: 28-year-old male with history of IV drug use, last use of heroin about 2 weeks ago, and tattoos, last recent tattoo 4 weeks ago; presents today as a transfer from outside freestanding ER for neck pain. Patient reports he has been having neck pain over the past month and has been seen outpatient but has not improved.  Patient reports playing with the dog earlier which exacerbated his pain and why he presented to the emergency room.  Associated symptoms:  Lower extremity numbness/tingling.  Patient was evaluated in the ER MRI performed and shows a cervical C6-7 with surrounding vertebral body osteomyelitis. Neurosurgery was consulted by ER.  Vancomycin was started.  Hospital Medicine consulted patient admitted.  Patient seen and examined on arrival.  Cervical collar in place on arrival.  Patient with allergies to cephalosporin reports hives in past.  Patient with increased risk of MRSA/MSSA will cover with both vancomycin and meropenem (given patient allergy).    Past Medical History:   Diagnosis Date    Asthma        Past Surgical History:   Procedure Laterality Date    NO PAST SURGERIES         Review of patient's allergies indicates:   Allergen Reactions    Ceclor [cefaclor] Hives    Cefixime Hives    Cefzil [cefprozil] Hives       No current facility-administered medications on file prior to encounter.      Current Outpatient Medications on File Prior to Encounter    Medication Sig    [DISCONTINUED] cyclobenzaprine (FLEXERIL) 10 MG tablet Take 10 mg by mouth 3 (three) times daily as needed for Muscle spasms.    [DISCONTINUED] ibuprofen (ADVIL,MOTRIN) 800 MG tablet TAKE ONE TABLET BY MOUTH THREE TIMES A DAY AS NEEDED FOR PAIN FOR TEN DAYS. TAKE WITH FOOD    [DISCONTINUED] naproxen (NAPROSYN) 375 MG tablet Take 1 tablet (375 mg total) by mouth 2 (two) times daily with meals.    [DISCONTINUED] tiZANidine (ZANAFLEX) 4 MG tablet TAKE ONE TABLET BY MOUTH AT BEDTIME AS NEEDED FOR 30 DAYS    [DISCONTINUED] traMADol (ULTRAM) 50 mg tablet Take 1 tablet (50 mg total) by mouth every 6 (six) hours as needed.     Family History     Reviewed and not Pertinent           Tobacco Use    Smoking status: Current Every Day Smoker     Packs/day: 1.00     Types: Cigarettes    Smokeless tobacco: Former User   Substance and Sexual Activity    Alcohol use: Yes     Comment: seldom    Drug use: Yes     Types: IV     Comment: heroin    Sexual activity: Not on file     Review of Systems   Constitutional: Negative for chills, diaphoresis, fatigue and fever.   HENT: Negative for congestion, sore throat and voice change.    Eyes: Negative for photophobia and visual disturbance.   Respiratory: Negative for cough, shortness of breath, wheezing and stridor.    Cardiovascular: Negative for chest pain and leg swelling.   Gastrointestinal: Negative for abdominal distention, abdominal pain, constipation, diarrhea, nausea and vomiting.   Endocrine: Negative for polydipsia, polyphagia and polyuria.   Genitourinary: Negative for difficulty urinating, dysuria, flank pain, testicular pain and urgency.   Musculoskeletal: Positive for back pain and neck pain. Negative for joint swelling and neck stiffness.   Skin: Negative for color change and rash.   Allergic/Immunologic: Negative for immunocompromised state.   Neurological: Negative for dizziness, syncope, weakness, numbness and headaches.  Positive for  numbness/tingling  Hematological: Does not bruise/bleed easily.   Psychiatric/Behavioral: Negative for agitation, behavioral problems and confusion.     Objective:     Vital Signs (Most Recent):  Temp: 99.7 °F (37.6 °C) (07/15/19 2119)  Pulse: 93 (07/15/19 2119)  Resp: 16 (07/15/19 2119)  BP: 131/79 (07/15/19 2119)  SpO2: 95 % (07/15/19 2119) Vital Signs (24h Range):  Temp:  [98.3 °F (36.8 °C)-101.2 °F (38.4 °C)] 99.7 °F (37.6 °C)  Pulse:  [] 93  Resp:  [13-20] 16  SpO2:  [95 %-100 %] 95 %  BP: (127-164)/(73-90) 131/79     Weight: 66.1 kg (145 lb 11.6 oz)  Body mass index is 22.09 kg/m².    Physical Exam   Constitutional: He is oriented to person, place, and time. He appears well-developed and well-nourished. No distress.   HENT:   Head: Normocephalic and atraumatic.   Nose: Nose normal.   Eyes: Pupils are equal, round, and reactive to light. Conjunctivae and EOM are normal. No scleral icterus.   Neck: Normal range of motion. Neck supple. No tracheal deviation present.   Cardiovascular: Normal rate, regular rhythm, normal heart sounds and intact distal pulses.   No murmur heard.  Pulmonary/Chest: Effort normal and breath sounds normal. No stridor. No respiratory distress. He has no wheezes. He has no rales.   Abdominal: Soft. Bowel sounds are normal. He exhibits no distension. There is no tenderness. There is no guarding.   Genitourinary:   Genitourinary Comments: ua neg  uds +   Musculoskeletal: Normal range of motion. He exhibits no edema or deformity.   Cervical collar in place  Unsteady gait  General ext weakness 4/5 to all 4.    Neurological: He is alert and oriented to person, place, and time. No cranial nerve deficit.   Skin: Skin is warm and dry. Capillary refill takes less than 2 seconds. No rash noted. He is not diaphoretic.   Psychiatric: He has a normal mood and affect. His behavior is normal. Judgment and thought content normal.   Nursing note and vitals reviewed.        CRANIAL NERVES     CN III,  IV, VI   Pupils are equal, round, and reactive to light.  Extraocular motions are normal.        Significant Labs: All pertinent labs within the past 24 hours have been reviewed.  Results for orders placed or performed during the hospital encounter of 07/15/19   CBC auto differential   Result Value Ref Range    WBC 14.48 (H) 3.90 - 12.70 K/uL    RBC 4.50 (L) 4.60 - 6.20 M/uL    Hemoglobin 12.7 (L) 14.0 - 18.0 g/dL    Hematocrit 39.0 (L) 40.0 - 54.0 %    Mean Corpuscular Volume 87 82 - 98 fL    Mean Corpuscular Hemoglobin 28.2 27.0 - 31.0 pg    Mean Corpuscular Hemoglobin Conc 32.6 32.0 - 36.0 g/dL    RDW 12.5 11.5 - 14.5 %    Platelets 300 150 - 350 K/uL    MPV 9.7 9.2 - 12.9 fL    Gran # (ANC) 10.5 (H) 1.8 - 7.7 K/uL    Lymph # 2.6 1.0 - 4.8 K/uL    Mono # 0.9 0.3 - 1.0 K/uL    Eos # 0.4 0.0 - 0.5 K/uL    Baso # 0.02 0.00 - 0.20 K/uL    Gran% 73.2 (H) 38.0 - 73.0 %    Lymph% 18.2 18.0 - 48.0 %    Mono% 5.9 4.0 - 15.0 %    Eosinophil% 2.6 0.0 - 8.0 %    Basophil% 0.1 0.0 - 1.9 %    Differential Method Automated    Comprehensive metabolic panel   Result Value Ref Range    Sodium 139 136 - 145 mmol/L    Potassium 3.4 (L) 3.5 - 5.1 mmol/L    Chloride 103 95 - 110 mmol/L    CO2 25 23 - 29 mmol/L    Glucose 129 (H) 70 - 110 mg/dL    BUN, Bld 11 6 - 20 mg/dL    Creatinine 0.8 0.5 - 1.4 mg/dL    Calcium 8.9 8.7 - 10.5 mg/dL    Total Protein 7.7 6.0 - 8.4 g/dL    Albumin 3.2 (L) 3.5 - 5.2 g/dL    Total Bilirubin 0.3 0.1 - 1.0 mg/dL    Alkaline Phosphatase 60 55 - 135 U/L    AST 10 10 - 40 U/L    ALT 9 (L) 10 - 44 U/L    Anion Gap 11 8 - 16 mmol/L    eGFR if African American >60.0 >60 mL/min/1.73 m^2    eGFR if non African American >60.0 >60 mL/min/1.73 m^2   Urinalysis, Reflex to Urine Culture Urine, Clean Catch   Result Value Ref Range    Specimen UA Urine, Clean Catch     Color, UA Yellow Yellow, Straw, Olivia    Appearance, UA Clear Clear    pH, UA 6.0 5.0 - 8.0    Specific Gravity, UA 1.010 1.005 - 1.030    Protein, UA  Negative Negative    Glucose, UA Negative Negative    Ketones, UA Negative Negative    Bilirubin (UA) Negative Negative    Occult Blood UA Trace (A) Negative    Nitrite, UA Negative Negative    Urobilinogen, UA Negative <2.0 EU/dL    Leukocytes, UA Negative Negative   Drug screen panel, emergency   Result Value Ref Range    Benzodiazepines Negative     Methadone metabolites Negative     Cocaine (Metab.) Negative     Opiate Scrn, Ur Presumptive Positive     Barbiturate Screen, Ur Negative     Amphetamine Screen, Ur Negative     THC Presumptive Positive     Phencyclidine Negative     Creatinine, Random Ur 101.9 23.0 - 375.0 mg/dL    Toxicology Information SEE COMMENT    C-reactive protein   Result Value Ref Range    .0 (H) 0.0 - 8.2 mg/L       Significant Imaging: I have reviewed all pertinent imaging results/findings within the past 24 hours.   Imaging Results          MRI Cervical Spine Without Contrast (Final result)  Result time 07/15/19 15:59:07    Final result by Paco Merritt Jr., MD (07/15/19 15:59:07)                 Impression:      1. 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  No definite cord edema at this time.  Postcontrast imaging may be obtained as needed.  2. Vertebral body osteomyelitis of C5, C6, C7, and T1.  3. Anterior paraspinal phlegmon/abscess extending from mid C2 to mid T1.  4. Foraminal stenosis at C4-C5, C5-C6, and C6-C7.      Electronically signed by: Paco Merritt Jr., MD  Date:    07/15/2019  Time:    15:59             Narrative:    EXAMINATION:  MRI CERVICAL SPINE WITHOUT CONTRAST    CLINICAL HISTORY:  Spine fracture, traumatic, cervical;    TECHNIQUE:  MR Cervical spine without contrast. Sagittal T1, T2, STIR. Axial 3D, T2. Coronal T1.    COMPARISON:  None available.    FINDINGS:  There is erosive endplate change, complete disc space height loss, and wedge deformity of the C6 and C7 vertebral  bodies with abnormal marrow signal most consistent with spondylodiscitis.  There is also abnormal marrow signal within the bodies of C5 and T1.  There is disc space abscess/phlegmon/retropulsion relating to wedge compression deformity at C6-C7 that posteriorly protrudes and flattens the thecal sac to 5 mm AP.  The ligamentum flavum/interspinous ligaments at C5-C6 are stretched taut posteriorly but are intact.  There is extensive anterior paraspinal swelling/edema possibly with central loculated abscess collections.  This extends for a craniocaudal distance of 12 cm from the level of mid C2 to mid T1.  Intervertebral disc levels are as follows:    C2-C3 disc: No significant disc pathology.  Normal facet joints.  No spinal canal or neural foraminal stenosis.    C3-C4 disc: No significant disc pathology.  Normal facet joints.  No spinal canal or neural foraminal stenosis.    C4-C5 disc: Mild disc space height loss.  Mild uncovertebral joint degeneration bilaterally.  The thecal sac measures 11 mm AP.  Mild bilateral foraminal stenosis.    C5-C6 disc: Disc space height loss with moderate/severe uncovertebral joint degeneration and hypertrophy bilaterally.  There is severe bilateral foraminal stenosis.  The thecal sac measures 9 mm AP.    C6-C7 disc: Level of spondylodiscitis with severe bilateral foraminal stenosis and severe thecal sac stenosis flattened to 5 mm.  Slightly increased T2 signal within the cord.    C7-T1 disc: Normal disc space height and normal facet joints.  Mild bilateral foraminal stenosis relating to phlegmonous material.  The thecal sac measures 12 mm.                               CT Cervical Spine Without Contrast (Final result)  Result time 07/15/19 15:16:27    Final result by Murray Bowman MD (07/15/19 15:16:27)                 Impression:      Compression deformities are seen at C6 and C4.  Wedge compression fracture deformities are noted at C6 and C4.  Retropulsion at C6/7 producing  narrowing of the central canal to 6-7 mm and moderate to severe left and mild-to-moderate right neural foraminal narrowing.    COMMUNICATION  This critical result was discovered/received at 15:05.  The critical information above was relayed directly by me by telephone to Dr. Foote on 07/15/2019 at 15:07.    All CT scans at this facility use dose modulation, iterative reconstruction, and/or weight base dosing when appropriate to reduce radiation dose to as low as reasonably achievable.      Electronically signed by: Murray Bowman MD  Date:    07/15/2019  Time:    15:16             Narrative:    EXAMINATION:  CT CERVICAL SPINE WITHOUT CONTRAST    CLINICAL HISTORY:  Spine fracture, traumatic, cervical;    TECHNIQUE:  1.25 mm axial noncontrast CT images were obtained through the cervical spine using soft tissue and bony algorithm so.  Sagittal coronal reformats were also submitted for interpretation.    COMPARISON:  07/15/2019    FINDINGS:  Compression deformities are seen at C6 and C4.  Wedge compression fracture deformities are noted at C6 and C4 which appear significantly comminuted..  Retropulsion at C6/7 producing narrowing of the central canal to 6-7 mm. Reversal of the normal cervical lordosis with epicenter at C6/7.  Moderate to severe left and mild-to-moderate right neural foraminal narrowing.  Posterior elements appear intact.  The vertebral body heights are otherwise normal.  Intervertebral disc space height is maintained at the remaining levels.  Moderate prevertebral soft tissue swelling within the lower cervical region measuring up to 2.7 cm.    No significant degenerative changes.  Please note that routine CT of the spine is limited in its evaluation of extradural/epidural disease.  Lung apices are clear.                              I have personally reviewed the patients labs, imaging, ekg (ordered). Chest Xray (ordered) and discussed the patient case in detail with the Er provider    Secure chat  sent to Neurosurgery on call, awaiting response. Will follow up.     Assessment/Plan:     * Cervical spinal cord compression  Related to abscess  Neurosurgery consult in ED. Awaiting recommendations.  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  Patient does have some mild Neuro impairment with unsteady gait and generalized weakness all extremities on my exam and I have notified Neuro surgery at this time.  Awaiting their response/recommendations.  Patient was started on vancomycin in ED will increase coverage with meropenem.  Given his allergy and findings of osteo on MRI.  Consider addition of nafcillin to regimen pending course.    Will consult ID  Neuro checks  Further evaluation/diagnostics/interventions/consults pending course       Sepsis  Related to above.   Continue vanc and meropenem  Id consult  Neurosurgery consult  Further evaluation/diagnostics/interventions/consults pending course         Elevated BP without diagnosis of hypertension  Monitor consider daily therapy pending course  Further evaluation/diagnostics/interventions/consults pending course         Hypokalemia  borderline  Monitor and supplement as need      IV drug user  Vancomycin and meropenem.  Consider addition of nafcillin pending course.  Id consult  Check echo  The patient was counseled on the dangers of use and encouraged to quit.  Further evaluation/diagnostics/interventions/consults pending course       Spondylodiscitis  Vanc/meropenum  Neurosurgery managing.   Cervical collar inplace  Further evaluation/diagnostics/interventions/consults pending course         VTE Risk Mitigation (From admission, onward)        Ordered     Place sequential compression  device  Until discontinued     No pharmacological giving possible need for intervention pending course. 07/15/19 2137         **Portions of this note has been dictated using speech recognition software, M*Modal Fluency Direct; although, time has been taken to proof read and revise it may still contain misspellings, grammatical and or other errors.**    Total critical care time: 40 mins    Murray Allen NP  Department of Hospital Medicine   [facility]

## 2019-07-16 NOTE — ASSESSMENT & PLAN NOTE
Vanc/meropenum  Neurosurgery managing.   Cervical collar inplace  Further evaluation/diagnostics/interventions/consults pending course     07/16- neurosurgery follow up for operative repair , continue Vancomycin, add aztreonam

## 2019-07-16 NOTE — PLAN OF CARE
Met with patient and family initial assessment completed . Patient is independent with adls and iadls.  Patient lives with his mother, stepfather and brother.  Patient denies any post hospital needs or services at this time.  Updated white board with 's name and number. Transitional Care Folder, Discharge Planning Begins on Admission pamphlet, [facility] Bedside Delivery pamphlet, Advance Directive information given to patient along with the contact information given.Instructed patient or family to call with any questions or concerns.          D/C Plan: Home with family  PCP:MD Luc  Preferred Pharmacy: [facility] in [place]  Discharge transportation: POV with family  [facility]:pending  Pharmacy Bedside Delivery:no             07/16/19 1410   Discharge Assessment   Assessment Type Discharge Planning Assessment   Confirmed/corrected address and phone number on facesheet? Yes   Assessment information obtained from? Patient;Caregiver;Medical Record   Expected Length of Stay (days)   (tbd)   Communicated expected length of stay with patient/caregiver yes   Prior to hospitilization cognitive status: Alert/Oriented   Prior to hospitalization functional status: Independent   Current cognitive status: Alert/Oriented   Current Functional Status: Independent   Facility Arrived From: home   Lives With parent(s);sibling(s)   Able to Return to Prior Arrangements yes   Is patient able to care for self after discharge? Yes   Who are your caregiver(s) and their phone number(s)? Marge Soares ( mother ) [phone]   Patient's perception of discharge disposition home or selfcare   Readmission Within the Last 30 Days no previous admission in last 30 days   Patient currently being followed by outpatient case management? No   Patient currently receives any other outside agency services? No   Equipment Currently Used at Home other (see comments)  (c.collar)   Do you have any problems affording any of  your prescribed medications? No   Is the patient taking medications as prescribed? yes   Does the patient have transportation home? Yes   Transportation Anticipated family or friend will provide   Does the patient receive services at the [facility]? No   Discharge Plan A Home;Home with family   Discharge Plan B Home   DME Needed Upon Discharge  none   Patient/Family in Agreement with Plan yes

## 2019-07-16 NOTE — PROGRESS NOTES
[facility] -   Neurosurgery  Progress Note    Subjective:     History of Present Illness:   Alex Soares is a 28 y.o. male  with history of IV drug use (last use of heroin about 2 weeks ago) and tattoos (last recent tattoo 4 weeks ago) who presents today as a transfer from outside freestanding ER for neck pain. Patient reports he has been having neck pain over the past month and has been seen outpatient but has not improved.  Patient reports playing with the dog earlier which exacerbated his pain and why he presented to the emergency room. Patient complains of radiating pain into left upper extremity with bilateral upper extremity numbness/tingling/weakness.  Associated symptoms:  Lower extremity numbness/tingling/weakness. Per patient, he has had unsteady walking and feels is unable to ambulate long distances. Patient was evaluated in the ER MRI performed and shows a cervical C6-7 with surrounding vertebral body osteomyelitis. Vancomycin was started. Cervical collar placed.  Hospital Medicine consulted patient admitted. Patient seen and examined on arrival.  Patient with allergies to cephalosporin reports hives in past.  Patient with increased risk of MRSA/MSSA will cover with both vancomycin and meropenem (given patient allergy). Neurosurgery was consulted.     Patient in cervical collar overnight. Patient given IV Vanc and Meropenem overnight with improvement in WBC. Per patient, weakness improved and gait improved. Patient was able to get out of bed and ambulate to bathroom. Since last night patient reports significant improvement. He has no issues with pain. He is able to get up and ambulate. Reports that his weakness is improving with upper extremities.     Post-Op Info:  Procedure(s) (LRB):  DECOMPRESSION AND FUSION, SPINE, CERVICAL, ANTERIOR APPROACH Cervical corpectomy C6-7 (Left)          Medications:  Continuous Infusions:  Scheduled Meds:   dexamethasone  4 mg Intravenous Q6H     famotidine  20 mg Oral BID    meropenem (MERREM) IVPB  500 mg Intravenous Q6H    nicotine  1 patch Transdermal Daily    vancomycin (VANCOCIN) IVPB  1,000 mg Intravenous Q12H     PRN Meds:acetaminophen, ibuprofen, ondansetron, ramelteon, traMADol     Review of Systems  Objective:     Weight: 66.1 kg (145 lb 11.6 oz)  Body mass index is 22.09 kg/m².  Vital Signs (Most Recent):  Temp: 97.8 °F (36.6 °C) (07/16/19 0720)  Pulse: 79 (07/16/19 0720)  Resp: 18 (07/16/19 0720)  BP: 129/73 (07/16/19 0720)  SpO2: 98 % (07/16/19 0720) Vital Signs (24h Range):  Temp:  [97.3 °F (36.3 °C)-101.2 °F (38.4 °C)] 97.8 °F (36.6 °C)  Pulse:  [] 79  Resp:  [13-20] 18  SpO2:  [95 %-100 %] 98 %  BP: (120-164)/(61-90) 129/73     Date 07/16/19 0700 - 07/17/19 0659   Shift 9316-0701 2593-5806 9972-8763 24 Hour Total   INTAKE   P.O. 0   0   Shift Total(mL/kg) 0(0)   0(0)   OUTPUT   Shift Total(mL/kg)       Weight (kg) 66.1 66.1 66.1 66.1                        Neurosurgery Physical Exam    Nursing note and vitals reviewed  Gen:Oriented to person, place, and time.             Appears stated age   Head:Normocephalic and atraumatic.  Nose: Nose normal.    Eyes: EOM are normal. Pupils are equal, round, and reactive to light.   Neck: Neck supple. No masses or lesions palpated  Cardiovascular: Intact distal pulses.    Abdominal: Soft.   Neurological: Alert and oriented to person, place, and time.  No cranial nerve deficit.  Coordination normal. Normal muscle tone  Psychiatric: Normal mood and affect. Behavior is normal.      Gait: Patient was lying in bed when seen earlier but reported he was up walking earlier today    Motor:   Right Right Left Left  Level Group   5 5 5 5 C5 Deltoid   5 5 5 5 C6 Bicep   5 5 5 5  Wrist extension    5 4 5 4 C7 Triceps   5 5 5 5  Wrist flexion   5 5 5 5 C8       Sensation: intact to light touch, serge UE  NL Decreased (R/L/BL) Level Sensation    X  C5 Lateral upper arm   X  C6 Thumb and index finger, lat  forearm   X  C7 Middle finger   X  C8 Ring and little finger   X  T1 Medial arm              Motor:   Right Right Left Left  Level Group   5 5 5 5 L2 Hip flexor (Psoas)   5 5 5 5 L3 Leg extension (Quads)   5 5 5 5 L4 Dorsiflexion & foot inversion (Tibialis Anterior)   5 5 5 5 L5 Great toe extension ( EHL)   5 5 5 5 S1 Foot eversion (Gastroc, PL & PB)     Sensation: intact to light touch, serge LE  NL Decreased (R/L/BL) Level Sensation    X  L2 Anterio-medial thigh   X  L3 Medial thigh around knee   X  L4 Medial foot   X  L5 Dorsum foot   X  S1 Lateral foot             Significant Labs:  Recent Labs   Lab 07/15/19  1438 07/16/19  0421   * 119*    140   K 3.4* 4.2    103   CO2 25 25   BUN 11 10   CREATININE 0.8 0.8   CALCIUM 8.9 9.7   MG  --  2.0     Recent Labs   Lab 07/15/19  1438 07/16/19  0421   WBC 14.48* 12.48   HGB 12.7* 13.6*   HCT 39.0* 40.6    344     Recent Labs   Lab 07/16/19  0421   INR 1.0   APTT 36.0*     Microbiology Results (last 7 days)     Procedure Component Value Units Date/Time    Blood culture [[id number]] Collected:  07/15/19 1619    Order Status:  Completed Specimen:  Blood from Peripheral, Upper Arm, Left Updated:  07/16/19 0915     Blood Culture, Routine No Growth to date    Blood culture [[id number]] Collected:  07/15/19 1600    Order Status:  Completed Specimen:  Blood from Peripheral, Upper Arm, Left Updated:  07/16/19 0915     Blood Culture, Routine No Growth to date        CMP:   Recent Labs   Lab 07/15/19  1438 07/16/19  0421   * 119*   CALCIUM 8.9 9.7   ALBUMIN 3.2*  --    PROT 7.7  --     140   K 3.4* 4.2   CO2 25 25    103   BUN 11 10   CREATININE 0.8 0.8   ALKPHOS 60  --    ALT 9*  --    AST 10  --    BILITOT 0.3  --      Recent Lab Results       07/16/19  1048   07/16/19  0421   07/15/19  1640   07/15/19  1639   07/15/19  1619        Benzodiazepines     Negative         Methadone metabolites     Negative         Phencyclidine     Negative          Albumin               Alkaline Phosphatase               ALT               Amphetamine Screen, Ur     Negative         Anion Gap   12           Appearance, UA       Clear       aPTT   36.0  Comment:  aPTT therapeutic range = 39-69 seconds           AST               Barbiturate Screen, Ur     Negative         Baso #   0.01           Basophil%   0.1           Bilirubin (UA)       Negative       BILIRUBIN TOTAL               Blood Culture, Routine         No Growth to date[P]     BUN, Bld   10           Calcium   9.7           QEF 55             Chloride   103           Cholesterol   109  Comment:  The National Cholesterol Education Program (NCEP) has set the  following guidelines (reference ranges) for Cholesterol:  Optimal.....................<200 mg/dL  Borderline High.............200-239 mg/dL  High........................> or = 240 mg/dL             CO2   25           Cocaine (Metab.)     Negative         Color, UA       Yellow       Creatinine   0.8           Creatinine, Random Ur     101.9  Comment:  The random urine reference ranges provided were established   for 24 hour urine collections.  No reference ranges exist for  random urine specimens.  Correlate clinically.           CRP               Diastolic Dysfunction No             Differential Method   Automated           eGFR if    >60           eGFR if non    >60  Comment:  Calculation used to obtain the estimated glomerular filtration  rate (eGFR) is the CKD-EPI equation.              Eos #   0.1           Eosinophil%   0.4           Estimated Avg Glucose   111           Glucose   119           Glucose, UA       Negative       Gran # (ANC)   10.7           Gran%   86.1           HDL   27  Comment:  The National Cholesterol Education Program (NCEP) has set the  following guidelines (reference values) for HDL Cholesterol:  Low...............<40 mg/dL  Optimal...........>60 mg/dL             Hdl/Cholesterol Ratio   24.8            Hematocrit   40.6           Hemoglobin   13.6           Hemoglobin A1C External   5.5  Comment:  ADA Screening Guidelines:  5.7-6.4%  Consistent with prediabetes  >or=6.5%  Consistent with diabetes  High levels of fetal hemoglobin interfere with the HbA1C  assay. Heterozygous hemoglobin variants (HbS, HgC, etc)do  not significantly interfere with this assay.   However, presence of multiple variants may affect accuracy.             Coumadin Monitoring INR   1.0  Comment:  Coumadin Therapy:  2.0 - 3.0 for INR for all indicators except mechanical heart valves  and antiphospholipid syndromes which should use 2.5 - 3.5.             Ketones, UA       Negative       LDL Cholesterol External   68.2  Comment:  The National Cholesterol Education Program (NCEP) has set the  following guidelines (reference values) for LDL Cholesterol:  Optimal.......................<130 mg/dL  Borderline High...............130-159 mg/dL  High..........................160-189 mg/dL  Very High.....................>190 mg/dL             Leukocytes, UA       Negative       Lymph #   1.4           Lymph%   11.1           Magnesium   2.0           MCH   28.8           MCHC   33.5           MCV   86           Mono #   0.4           Mono%   2.8           MPV   9.3           NITRITE UA       Negative       Non-HDL Cholesterol   82  Comment:  Risk category and Non-HDL cholesterol goals:  Coronary heart disease (CHD)or equivalent (10-year risk of CHD >20%):  Non-HDL cholesterol goal     <130 mg/dL  Two or more CHD risk factors and 10-year risk of CHD <= 20%:  Non-HDL cholesterol goal     <160 mg/dL  0 to 1 CHD risk factor:  Non-HDL cholesterol goal     <190 mg/dL             Occult Blood UA       Trace       Opiate Scrn, Ur     Presumptive Positive         Est. PA Systolic Pressure 26.43             pH, UA       6.0       Phosphorus   3.2           Platelets   344           Potassium   4.2           PROTEIN TOTAL               Protein, UA        Negative  Comment:  Recommend a 24 hour urine protein or a urine   protein/creatinine ratio if globulin induced proteinuria is  clinically suspected.         Protime   11.1           RBC   4.72           RDW   12.9           Sed Rate               Sodium   140           Specific [place], UA       1.010       Specimen UA       Urine, Clean Catch       Marijuana (THC) Metabolite     Presumptive Positive         Total Cholesterol/HDL Ratio   4.0           Toxicology Information     SEE COMMENT  Comment:  This screen includes the following classes of drugs at the   listed cut-off:  Benzodiazepines                  200 ng/ml  Methadone                        300 ng/ml  Cocaine metabolite               300 ng/ml  Opiates                          300 ng/ml  Barbiturates                     200 ng/ml  Amphetamines                    1000 ng/ml  Marijuana metabs (THC)            50 ng/ml  Phencyclidine (PCP)               25 ng/ml  High concentrations of Diphenhydramine may cross-react with  Phencyclidine PCP screening immunoassay giving a false   positive result.  High concentrations of Methylenedioxymethamphetamine (MDMA aka  Ectasy) and other structurally similar compounds may cross-   react with the Amphetamine/Methamphetamine screening   immunoassay giving a false positive result.  A metabolite of the anti-HIV drug Sustiva () may cause  false positive results in the Marijuana metabolite (THC)   screening assay.  Note: This exception list includes only more common   interferants in toxicology screen testing.  Because of many   cross-reactantspositive results on toxicology drug screens   should be confirmed whenever results do not correlate with   clinical presentation.  This report is intended for use in clinical monitoring and  management of patients. It is not intended for use in   employment related drug testing.  Because of any cross-reactants, positive results on toxicology  drug screens should be confirmed  whenever results do not  correlate with clinical presentation.  Presumptive positive results are unconfirmed and may be used   only for medical purposes.  Assay Intended Use: This asasy provides only a preliminary analytical  test result. A more specific alternate chemical method must be used  to obtain a confirmed analytical result. Gas chromatography/mass  spectrometry (GS/MS)is the preferred confirmatory method. Clinical  consideration and professional judgement should be applied to any   drug of abuse test result, particularly when preliminary results  are used.           Triglycerides   69  Comment:  The National Cholesterol Education Program (NCEP) has set the  following guidelines (reference values) for triglycerides:  Normal......................<150 mg/dL  Borderline High.............150-199 mg/dL  High........................200-499 mg/dL             TSH   0.412           UROBILINOGEN UA       Negative       WBC   12.48                            07/15/19  1600   07/15/19  1438        Benzodiazepines         Methadone metabolites         Phencyclidine         Albumin   3.2     Alkaline Phosphatase   60     ALT   9     Amphetamine Screen, Ur         Anion Gap   11     Appearance, UA         aPTT         AST   10     Barbiturate Screen, Ur         Baso #   0.02     Basophil%   0.1     Bilirubin (UA)         BILIRUBIN TOTAL   0.3  Comment:  For infants and newborns, interpretation of results should be based  on gestational age, weight and in agreement with clinical  observations.  Premature Infant recommended reference ranges:  Up to 24 hours.............<8.0 mg/dL  Up to 48 hours............<12.0 mg/dL  3-5 days..................<15.0 mg/dL  6-29 days.................<15.0 mg/dL       Blood Culture, Routine No Growth to date[P]       BUN, Bld   11     Calcium   8.9     QEF         Chloride   103     Cholesterol         CO2   25     Cocaine (Metab.)         Color, UA         Creatinine   0.8     Creatinine,  Random Ur         CRP   102.0     Diastolic Dysfunction         Differential Method   Automated     eGFR if    >60.0     eGFR if non    >60.0  Comment:  Calculation used to obtain the estimated glomerular filtration  rate (eGFR) is the CKD-EPI equation.        Eos #   0.4     Eosinophil%   2.6     Estimated Avg Glucose         Glucose   129     Glucose, UA         Gran # (ANC)   10.5     Gran%   73.2     HDL         Hdl/Cholesterol Ratio         Hematocrit   39.0     Hemoglobin   12.7     Hemoglobin A1C External         Coumadin Monitoring INR         Ketones, UA         LDL Cholesterol External         Leukocytes, UA         Lymph #   2.6     Lymph%   18.2     Magnesium         MCH   28.2     MCHC   32.6     MCV   87     Mono #   0.9     Mono%   5.9     MPV   9.7     NITRITE UA         Non-HDL Cholesterol         Occult Blood UA         Opiate Scrn, Ur         Est. PA Systolic Pressure         pH, UA         Phosphorus         Platelets   300     Potassium   3.4     PROTEIN TOTAL   7.7     Protein, UA         Protime         RBC   4.50     RDW   12.5     Sed Rate   88     Sodium   139     Specific Gravity, UA         Specimen UA         Marijuana (THC) Metabolite         Total Cholesterol/HDL Ratio         Toxicology Information         Triglycerides         TSH         UROBILINOGEN UA         WBC   14.48         All pertinent labs from the last 24 hours have been reviewed.  Significant Diagnostics:  MRI: No results found in the last 24 hours.    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

## 2019-07-16 NOTE — PLAN OF CARE
Problem: Adult Inpatient Plan of Care  Goal: Plan of Care Review  Outcome: Ongoing (interventions implemented as appropriate)  Pt remained free of injury during shift, stable condition, denied pain, C-collar in place, no acute distress, receiving antibiotics, neurochecks performed per order, and will continue to monitor. 24hr chart review performed.

## 2019-07-16 NOTE — ASSESSMENT & PLAN NOTE
Mg/johnson  Neurosurgery managing.   Cervical collar inplace  Further evaluation/diagnostics/interventions/consults pending course

## 2019-07-16 NOTE — SUBJECTIVE & OBJECTIVE
Interval History: 28-year-old male with history of IV drug use, last use of heroin - a day prior to presentation .  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  No definite cord edema at this time.  Postcontrast imaging may be obtained as needed.  2. Vertebral body osteomyelitis of C5, C6, C7, and T1.  3. Anterior paraspinal phlegmon/abscess extending from mid C2 to mid T1  Review of Systems   Constitutional: Negative for chills, diaphoresis, fatigue and fever.   HENT: Negative for congestion, sore throat and voice change.    Eyes: Negative for photophobia and visual disturbance.   Respiratory: Negative for cough, shortness of breath, wheezing and stridor.    Cardiovascular: Negative for chest pain and leg swelling.   Gastrointestinal: Negative for abdominal distention, abdominal pain, constipation, diarrhea, nausea and vomiting.   Endocrine: Negative for polydipsia, polyphagia and polyuria.   Genitourinary: Negative for difficulty urinating, dysuria, flank pain, testicular pain and urgency.   Musculoskeletal: Positive for back pain and neck pain. Negative for joint swelling and neck stiffness.   Skin: Negative for color change and rash.   Allergic/Immunologic: Negative for immunocompromised state.   Neurological: Negative for dizziness, syncope, weakness, numbness and headaches.   Hematological: Does not bruise/bleed easily.   Psychiatric/Behavioral: Negative for agitation, behavioral problems and confusion.     Objective:     Vital Signs (Most Recent):  Temp: 98.3 °F (36.8 °C) (07/16/19 1613)  Pulse: 99 (07/16/19 1613)  Resp: 18 (07/16/19 1613)  BP: (!) 152/86 (07/16/19 1613)  SpO2: 97 % (07/16/19 1613) Vital Signs (24h Range):  Temp:  [97.3 °F (36.3 °C)-101.2 °F (38.4 °C)] 98.3 °F (36.8 °C)  Pulse:  [] 99  Resp:  [13-20] 18  SpO2:  [95 %-100 %] 97 %  BP: (120-157)/(61-86) 152/86     Weight: 66.1 kg  (145 lb 11.6 oz)  Body mass index is 22.09 kg/m².    Intake/Output Summary (Last 24 hours) at 7/16/2019 1801  Last data filed at 7/16/2019 0800  Gross per 24 hour   Intake 850 ml   Output --   Net 850 ml      Physical Exam   Constitutional: He is oriented to person, place, and time. He appears well-developed and well-nourished. No distress.   HENT:   Head: Normocephalic and atraumatic.   Nose: Nose normal.   Eyes: Pupils are equal, round, and reactive to light. Conjunctivae and EOM are normal. No scleral icterus.   Neck: Normal range of motion. Neck supple. No tracheal deviation present.   Cardiovascular: Normal rate, regular rhythm, normal heart sounds and intact distal pulses.   No murmur heard.  Pulmonary/Chest: Effort normal and breath sounds normal. No stridor. No respiratory distress. He has no wheezes. He has no rales.   Abdominal: Soft. Bowel sounds are normal. He exhibits no distension. There is no tenderness. There is no guarding.   Genitourinary:   Genitourinary Comments: ua neg  uds +   Musculoskeletal: Normal range of motion. He exhibits no edema or deformity.   Cervical collar in place  Unsteady gait  General ext weakness 4/5 to all 4.    Neurological: He is alert and oriented to person, place, and time. No cranial nerve deficit.   Skin: Skin is warm and dry. Capillary refill takes less than 2 seconds. No rash noted. He is not diaphoretic.   Psychiatric: He has a normal mood and affect. His behavior is normal. Judgment and thought content normal.   Nursing note and vitals reviewed.      Significant Labs:   Blood Culture:   Recent Labs   Lab 07/15/19  1600 07/15/19  1619   LABBLOO No Growth to date No Growth to date     BMP:   Recent Labs   Lab 07/16/19  0421   *      K 4.2      CO2 25   BUN 10   CREATININE 0.8   CALCIUM 9.7   MG 2.0     All pertinent labs within the past 24 hours have been reviewed.    Significant Imaging: I have reviewed and interpreted all pertinent imaging  results/findings within the past 24 hours.

## 2019-07-16 NOTE — PLAN OF CARE
Problem: Adult Inpatient Plan of Care  Goal: Plan of Care Review  Outcome: Ongoing (interventions implemented as appropriate)  Sitting up in bed with C-collar in place. Denies pain/discomfort. Afebrile. IV antibiotic and IV steroids continued. Safety maintained. Chart check complete.

## 2019-07-16 NOTE — ASSESSMENT & PLAN NOTE
Vancomycin and meropenem.  Consider addition of nafcillin pending course.  Id consult  Check echo  The patient was counseled on the dangers of use and encouraged to quit.  Further evaluation/diagnostics/interventions/consults pending course     07/16- needs out patient drug cessation counseling

## 2019-07-16 NOTE — ASSESSMENT & PLAN NOTE
07/16- will do MRI of the spinal axis , discussed with Dr Jeong, will plan for operative repair

## 2019-07-16 NOTE — HOSPITAL COURSE
Mr Soares 28-year-old male with history of IV drug use, last use of heroin - a day prior to presentation .MRI of the cervical region- Spondylodiscitis at C6-C7 with epidural abscess/phlegmon causing severe thecal sac stenosis to 5 mm AP with cord compression.  No definite cord edema at this time.  Postcontrast imaging may be obtained as needed, vertebral body osteomyelitis of C5, C6, C7, and T1 and anterior paraspinal phlegmon/abscess extending from mid C2 to mid T1. On 07/17  Patint underwent Cervical spine decompression by Dr Jeong. As of 7/18, s/p anterior cervical decompression and fusion, POD # 1 .  Patient reported feeling well, moving all extremities, cervical collar intact. Vitals signs and labs stable. ID following, awaiting culture results from surgery.   As of 7/19, Continued Vancomycin and Meronem. Awaiting cultures to guide therapy.    As of 7/20, s/p s/p anterior cervical decompression and fusion, POD # 3. ID following, plan on 6-8 weeks of antibiotics preferably IV therapy. Awaiting further recommendations.   As of 7/21, POD # 4  s/p anterior cervical decompression and fusion, will need 6-8 weeks of IV antibiotics, patient has history of IV drug abuse. Will need LTAC placement.    07/22- awaiting LTAC placement , still on IV Vancomycin .  07/23- 28 year old man s/p . Cervical corpectomy C6   2. Cervical corpectomy C7   3. Placement of anterior plate with screws C5-T1   4. Cervical arthrodesis C5-T1  5. Placement of PEEK interbody cage    On 07/17.  Wound cultures - MRSA  He wants oral medications and not IV medications   07/24- he is awaiting LTAC placement -on vanco/rifampin.  He is afebrile.  WBC remains high at 21  As of 7/25, patient accepted to [facility]. Will need IV Vancomycin for 6-8 weeks pre ID recommendations. PICC line inserted. Keep Aspen collar in place at all times, except if taking shower. Neurosurgery follow up 2 weeks following surgery date. Patient seen, examined and deemed  suitable for discharge to [facility].

## 2019-07-16 NOTE — ASSESSMENT & PLAN NOTE
Neurosurgery consulted by ED and is aware patient's case.  Patient does have some mild Neuro impairment with unsteady gait and generalized weakness all extremities on my exam and I have notified Neuro surgery at this time.  Awaiting their response/recommendations.  Patient was started on vancomycin in ED will increase coverage with meropenem.  Given his allergy and findings of osteo on MRI.  Consider addition of nafcillin to regimen pending course.    Will consult ID  Neuro checks  Further evaluation/diagnostics/interventions/consults pending course     07/16- will continue vancomycin / and use Aztreonam -will use cultures to guide therapy

## 2019-07-16 NOTE — SUBJECTIVE & OBJECTIVE
Past Medical History:   Diagnosis Date    Asthma        Past Surgical History:   Procedure Laterality Date    NO PAST SURGERIES         Review of patient's allergies indicates:   Allergen Reactions    Ceclor [cefaclor] Hives    Cefixime Hives    Cefzil [cefprozil] Hives       No current facility-administered medications on file prior to encounter.      Current Outpatient Medications on File Prior to Encounter   Medication Sig    [DISCONTINUED] cyclobenzaprine (FLEXERIL) 10 MG tablet Take 10 mg by mouth 3 (three) times daily as needed for Muscle spasms.    [DISCONTINUED] ibuprofen (ADVIL,MOTRIN) 800 MG tablet TAKE ONE TABLET BY MOUTH THREE TIMES A DAY AS NEEDED FOR PAIN FOR TEN DAYS. TAKE WITH FOOD    [DISCONTINUED] naproxen (NAPROSYN) 375 MG tablet Take 1 tablet (375 mg total) by mouth 2 (two) times daily with meals.    [DISCONTINUED] tiZANidine (ZANAFLEX) 4 MG tablet TAKE ONE TABLET BY MOUTH AT BEDTIME AS NEEDED FOR 30 DAYS    [DISCONTINUED] traMADol (ULTRAM) 50 mg tablet Take 1 tablet (50 mg total) by mouth every 6 (six) hours as needed.     Family History     None        Tobacco Use    Smoking status: Current Every Day Smoker     Packs/day: 1.00     Types: Cigarettes    Smokeless tobacco: Former User   Substance and Sexual Activity    Alcohol use: Yes     Comment: seldom    Drug use: Yes     Types: IV     Comment: heroin    Sexual activity: Not on file     Review of Systems   Constitutional: Negative for chills, diaphoresis, fatigue and fever.   HENT: Negative for congestion, sore throat and voice change.    Eyes: Negative for photophobia and visual disturbance.   Respiratory: Negative for cough, shortness of breath, wheezing and stridor.    Cardiovascular: Negative for chest pain and leg swelling.   Gastrointestinal: Negative for abdominal distention, abdominal pain, constipation, diarrhea, nausea and vomiting.   Endocrine: Negative for polydipsia, polyphagia and polyuria.   Genitourinary:  Negative for difficulty urinating, dysuria, flank pain, testicular pain and urgency.   Musculoskeletal: Positive for back pain and neck pain. Negative for joint swelling and neck stiffness.   Skin: Negative for color change and rash.   Allergic/Immunologic: Negative for immunocompromised state.   Neurological: Negative for dizziness, syncope, weakness, numbness and headaches.   Hematological: Does not bruise/bleed easily.   Psychiatric/Behavioral: Negative for agitation, behavioral problems and confusion.     Objective:     Vital Signs (Most Recent):  Temp: 99.7 °F (37.6 °C) (07/15/19 2119)  Pulse: 93 (07/15/19 2119)  Resp: 16 (07/15/19 2119)  BP: 131/79 (07/15/19 2119)  SpO2: 95 % (07/15/19 2119) Vital Signs (24h Range):  Temp:  [98.3 °F (36.8 °C)-101.2 °F (38.4 °C)] 99.7 °F (37.6 °C)  Pulse:  [] 93  Resp:  [13-20] 16  SpO2:  [95 %-100 %] 95 %  BP: (127-164)/(73-90) 131/79     Weight: 66.1 kg (145 lb 11.6 oz)  Body mass index is 22.09 kg/m².    Physical Exam   Constitutional: He is oriented to person, place, and time. He appears well-developed and well-nourished. No distress.   HENT:   Head: Normocephalic and atraumatic.   Nose: Nose normal.   Eyes: Pupils are equal, round, and reactive to light. Conjunctivae and EOM are normal. No scleral icterus.   Neck: Normal range of motion. Neck supple. No tracheal deviation present.   Cardiovascular: Normal rate, regular rhythm, normal heart sounds and intact distal pulses.   No murmur heard.  Pulmonary/Chest: Effort normal and breath sounds normal. No stridor. No respiratory distress. He has no wheezes. He has no rales.   Abdominal: Soft. Bowel sounds are normal. He exhibits no distension. There is no tenderness. There is no guarding.   Genitourinary:   Genitourinary Comments: ua neg  uds +   Musculoskeletal: Normal range of motion. He exhibits no edema or deformity.   Cervical collar in place  Unsteady gait  General ext weakness 4/5 to all 4.    Neurological: He is  alert and oriented to person, place, and time. No cranial nerve deficit.   Skin: Skin is warm and dry. Capillary refill takes less than 2 seconds. No rash noted. He is not diaphoretic.   Psychiatric: He has a normal mood and affect. His behavior is normal. Judgment and thought content normal.   Nursing note and vitals reviewed.        CRANIAL NERVES     CN III, IV, VI   Pupils are equal, round, and reactive to light.  Extraocular motions are normal.        Significant Labs: All pertinent labs within the past 24 hours have been reviewed.  Results for orders placed or performed during the hospital encounter of 07/15/19   CBC auto differential   Result Value Ref Range    WBC 14.48 (H) 3.90 - 12.70 K/uL    RBC 4.50 (L) 4.60 - 6.20 M/uL    Hemoglobin 12.7 (L) 14.0 - 18.0 g/dL    Hematocrit 39.0 (L) 40.0 - 54.0 %    Mean Corpuscular Volume 87 82 - 98 fL    Mean Corpuscular Hemoglobin 28.2 27.0 - 31.0 pg    Mean Corpuscular Hemoglobin Conc 32.6 32.0 - 36.0 g/dL    RDW 12.5 11.5 - 14.5 %    Platelets 300 150 - 350 K/uL    MPV 9.7 9.2 - 12.9 fL    Gran # (ANC) 10.5 (H) 1.8 - 7.7 K/uL    Lymph # 2.6 1.0 - 4.8 K/uL    Mono # 0.9 0.3 - 1.0 K/uL    Eos # 0.4 0.0 - 0.5 K/uL    Baso # 0.02 0.00 - 0.20 K/uL    Gran% 73.2 (H) 38.0 - 73.0 %    Lymph% 18.2 18.0 - 48.0 %    Mono% 5.9 4.0 - 15.0 %    Eosinophil% 2.6 0.0 - 8.0 %    Basophil% 0.1 0.0 - 1.9 %    Differential Method Automated    Comprehensive metabolic panel   Result Value Ref Range    Sodium 139 136 - 145 mmol/L    Potassium 3.4 (L) 3.5 - 5.1 mmol/L    Chloride 103 95 - 110 mmol/L    CO2 25 23 - 29 mmol/L    Glucose 129 (H) 70 - 110 mg/dL    BUN, Bld 11 6 - 20 mg/dL    Creatinine 0.8 0.5 - 1.4 mg/dL    Calcium 8.9 8.7 - 10.5 mg/dL    Total Protein 7.7 6.0 - 8.4 g/dL    Albumin 3.2 (L) 3.5 - 5.2 g/dL    Total Bilirubin 0.3 0.1 - 1.0 mg/dL    Alkaline Phosphatase 60 55 - 135 U/L    AST 10 10 - 40 U/L    ALT 9 (L) 10 - 44 U/L    Anion Gap 11 8 - 16 mmol/L    eGFR if African  American >60.0 >60 mL/min/1.73 m^2    eGFR if non African American >60.0 >60 mL/min/1.73 m^2   Urinalysis, Reflex to Urine Culture Urine, Clean Catch   Result Value Ref Range    Specimen UA Urine, Clean Catch     Color, UA Yellow Yellow, Straw, Olivia    Appearance, UA Clear Clear    pH, UA 6.0 5.0 - 8.0    Specific Gravity, UA 1.010 1.005 - 1.030    Protein, UA Negative Negative    Glucose, UA Negative Negative    Ketones, UA Negative Negative    Bilirubin (UA) Negative Negative    Occult Blood UA Trace (A) Negative    Nitrite, UA Negative Negative    Urobilinogen, UA Negative <2.0 EU/dL    Leukocytes, UA Negative Negative   Drug screen panel, emergency   Result Value Ref Range    Benzodiazepines Negative     Methadone metabolites Negative     Cocaine (Metab.) Negative     Opiate Scrn, Ur Presumptive Positive     Barbiturate Screen, Ur Negative     Amphetamine Screen, Ur Negative     THC Presumptive Positive     Phencyclidine Negative     Creatinine, Random Ur 101.9 23.0 - 375.0 mg/dL    Toxicology Information SEE COMMENT    C-reactive protein   Result Value Ref Range    .0 (H) 0.0 - 8.2 mg/L       Significant Imaging: I have reviewed all pertinent imaging results/findings within the past 24 hours.   Imaging Results          MRI Cervical Spine Without Contrast (Final result)  Result time 07/15/19 15:59:07    Final result by Paco Merritt Jr., MD (07/15/19 15:59:07)                 Impression:      1. 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  No definite cord edema at this time.  Postcontrast imaging may be obtained as needed.  2. Vertebral body osteomyelitis of C5, C6, C7, and T1.  3. Anterior paraspinal phlegmon/abscess extending from mid C2 to mid T1.  4. Foraminal stenosis at C4-C5, C5-C6, and C6-C7.      Electronically signed by: Paco Merritt Jr., MD  Date:    07/15/2019  Time:    15:59             Narrative:     EXAMINATION:  MRI CERVICAL SPINE WITHOUT CONTRAST    CLINICAL HISTORY:  Spine fracture, traumatic, cervical;    TECHNIQUE:  MR Cervical spine without contrast. Sagittal T1, T2, STIR. Axial 3D, T2. Coronal T1.    COMPARISON:  None available.    FINDINGS:  There is erosive endplate change, complete disc space height loss, and wedge deformity of the C6 and C7 vertebral bodies with abnormal marrow signal most consistent with spondylodiscitis.  There is also abnormal marrow signal within the bodies of C5 and T1.  There is disc space abscess/phlegmon/retropulsion relating to wedge compression deformity at C6-C7 that posteriorly protrudes and flattens the thecal sac to 5 mm AP.  The ligamentum flavum/interspinous ligaments at C5-C6 are stretched taut posteriorly but are intact.  There is extensive anterior paraspinal swelling/edema possibly with central loculated abscess collections.  This extends for a craniocaudal distance of 12 cm from the level of mid C2 to mid T1.  Intervertebral disc levels are as follows:    C2-C3 disc: No significant disc pathology.  Normal facet joints.  No spinal canal or neural foraminal stenosis.    C3-C4 disc: No significant disc pathology.  Normal facet joints.  No spinal canal or neural foraminal stenosis.    C4-C5 disc: Mild disc space height loss.  Mild uncovertebral joint degeneration bilaterally.  The thecal sac measures 11 mm AP.  Mild bilateral foraminal stenosis.    C5-C6 disc: Disc space height loss with moderate/severe uncovertebral joint degeneration and hypertrophy bilaterally.  There is severe bilateral foraminal stenosis.  The thecal sac measures 9 mm AP.    C6-C7 disc: Level of spondylodiscitis with severe bilateral foraminal stenosis and severe thecal sac stenosis flattened to 5 mm.  Slightly increased T2 signal within the cord.    C7-T1 disc: Normal disc space height and normal facet joints.  Mild bilateral foraminal stenosis relating to phlegmonous material.  The thecal sac  measures 12 mm.                               CT Cervical Spine Without Contrast (Final result)  Result time 07/15/19 15:16:27    Final result by Murray Bowman MD (07/15/19 15:16:27)                 Impression:      Compression deformities are seen at C6 and C4.  Wedge compression fracture deformities are noted at C6 and C4.  Retropulsion at C6/7 producing narrowing of the central canal to 6-7 mm and moderate to severe left and mild-to-moderate right neural foraminal narrowing.    COMMUNICATION  This critical result was discovered/received at 15:05.  The critical information above was relayed directly by me by telephone to Dr. Foote on 07/15/2019 at 15:07.    All CT scans at this facility use dose modulation, iterative reconstruction, and/or weight base dosing when appropriate to reduce radiation dose to as low as reasonably achievable.      Electronically signed by: Murray Bowman MD  Date:    07/15/2019  Time:    15:16             Narrative:    EXAMINATION:  CT CERVICAL SPINE WITHOUT CONTRAST    CLINICAL HISTORY:  Spine fracture, traumatic, cervical;    TECHNIQUE:  1.25 mm axial noncontrast CT images were obtained through the cervical spine using soft tissue and bony algorithm so.  Sagittal coronal reformats were also submitted for interpretation.    COMPARISON:  07/15/2019    FINDINGS:  Compression deformities are seen at C6 and C4.  Wedge compression fracture deformities are noted at C6 and C4 which appear significantly comminuted..  Retropulsion at C6/7 producing narrowing of the central canal to 6-7 mm. Reversal of the normal cervical lordosis with epicenter at C6/7.  Moderate to severe left and mild-to-moderate right neural foraminal narrowing.  Posterior elements appear intact.  The vertebral body heights are otherwise normal.  Intervertebral disc space height is maintained at the remaining levels.  Moderate prevertebral soft tissue swelling within the lower cervical region measuring up to 2.7 cm.    No  significant degenerative changes.  Please note that routine CT of the spine is limited in its evaluation of extradural/epidural disease.  Lung apices are clear.

## 2019-07-16 NOTE — ASSESSMENT & PLAN NOTE
Related to abscess  Neurosurgery consult in ED. Awaiting recommendations.  Consider Steroid pending course but will await clarification of indication given active infection   Further evaluation/diagnostics/interventions/consults pending course

## 2019-07-16 NOTE — PROGRESS NOTES
Discussed case with Dr. Jeong and made aware of patients neuromus exam findings. Recommends starting decadron IV 4mg every 6 hours and plans to see early in Am.

## 2019-07-16 NOTE — ASSESSMENT & PLAN NOTE
Neurosurgery consulted by ED and is aware patient's case.  Patient does have some mild Neuro impairment with unsteady gait and generalized weakness all extremities on my exam and I have notified Neuro surgery at this time.  Awaiting their response/recommendations.  Patient was started on vancomycin in ED will increase coverage with meropenem.  Given his allergy and findings of osteo on MRI.  Consider addition of nafcillin to regimen pending course.    Will consult ID  Neuro checks  Further evaluation/diagnostics/interventions/consults pending course

## 2019-07-16 NOTE — HPI
28-year-old male with history of IV drug use, last use of heroin about 2 weeks ago, and tattoos, last recent tattoo 4 weeks ago; presents today as a transfer from outside freestanding ER for neck pain. Patient reports he has been having neck pain over the past month and has been seen outpatient but has not improved.  Patient reports playing with the dog earlier which exacerbated his pain and why he presented to the emergency room.  Patient was evaluated in the ER MRI performed and shows a cervical C6-7 with surrounding vertebral body osteomyelitis. Neurosurgery was consulted by ER.  Vancomycin was started.  Hospital Medicine consulted patient admitted.  Patient seen and examined on arrival.  Cervical collar in place on arrival.  Patient with allergies to cephalosporin reports hives in past.  Patient with increased risk of MRSA/MSSA will cover with both vancomycin and meropenem (given patient allergy).

## 2019-07-16 NOTE — ASSESSMENT & PLAN NOTE
Related to above.   Continue vanc and meropenem  Id consult  Neurosurgery consult  Further evaluation/diagnostics/interventions/consults pending course

## 2019-07-17 ENCOUNTER — ANESTHESIA EVENT (OUTPATIENT)
Dept: SURGERY | Facility: HOSPITAL | Age: 29
DRG: 853 | End: 2019-07-17
Payer: MEDICAID

## 2019-07-17 ENCOUNTER — ANESTHESIA (OUTPATIENT)
Dept: SURGERY | Facility: HOSPITAL | Age: 29
DRG: 853 | End: 2019-07-17
Payer: MEDICAID

## 2019-07-17 PROBLEM — G95.20 CERVICAL SPINAL CORD COMPRESSION: Chronic | Status: ACTIVE | Noted: 2019-07-15

## 2019-07-17 LAB
ANION GAP SERPL CALC-SCNC: 11 MMOL/L (ref 8–16)
BASOPHILS # BLD AUTO: 0 K/UL (ref 0–0.2)
BASOPHILS NFR BLD: 0 % (ref 0–1.9)
BUN SERPL-MCNC: 14 MG/DL (ref 6–20)
CALCIUM SERPL-MCNC: 9.6 MG/DL (ref 8.7–10.5)
CHLORIDE SERPL-SCNC: 106 MMOL/L (ref 95–110)
CO2 SERPL-SCNC: 23 MMOL/L (ref 23–29)
CREAT SERPL-MCNC: 0.7 MG/DL (ref 0.5–1.4)
DIFFERENTIAL METHOD: ABNORMAL
EOSINOPHIL # BLD AUTO: 0 K/UL (ref 0–0.5)
EOSINOPHIL NFR BLD: 0 % (ref 0–8)
ERYTHROCYTE [DISTWIDTH] IN BLOOD BY AUTOMATED COUNT: 12.7 % (ref 11.5–14.5)
EST. GFR  (AFRICAN AMERICAN): >60 ML/MIN/1.73 M^2
EST. GFR  (NON AFRICAN AMERICAN): >60 ML/MIN/1.73 M^2
GLUCOSE SERPL-MCNC: 139 MG/DL (ref 70–110)
GRAM STN SPEC: NORMAL
GRAM STN SPEC: NORMAL
HCT VFR BLD AUTO: 39.7 % (ref 40–54)
HGB BLD-MCNC: 13.3 G/DL (ref 14–18)
LYMPHOCYTES # BLD AUTO: 1.6 K/UL (ref 1–4.8)
LYMPHOCYTES NFR BLD: 10.8 % (ref 18–48)
MCH RBC QN AUTO: 28.5 PG (ref 27–31)
MCHC RBC AUTO-ENTMCNC: 33.5 G/DL (ref 32–36)
MCV RBC AUTO: 85 FL (ref 82–98)
MONOCYTES # BLD AUTO: 0.6 K/UL (ref 0.3–1)
MONOCYTES NFR BLD: 3.8 % (ref 4–15)
NEUTROPHILS # BLD AUTO: 12.4 K/UL (ref 1.8–7.7)
NEUTROPHILS NFR BLD: 85.7 % (ref 38–73)
PLATELET # BLD AUTO: 383 K/UL (ref 150–350)
PMV BLD AUTO: 9.4 FL (ref 9.2–12.9)
POTASSIUM SERPL-SCNC: 4 MMOL/L (ref 3.5–5.1)
RBC # BLD AUTO: 4.66 M/UL (ref 4.6–6.2)
SODIUM SERPL-SCNC: 140 MMOL/L (ref 136–145)
VANCOMYCIN TROUGH SERPL-MCNC: 3.9 UG/ML (ref 10–22)
WBC # BLD AUTO: 14.57 K/UL (ref 3.9–12.7)

## 2019-07-17 PROCEDURE — 63600175 PHARM REV CODE 636 W HCPCS: Performed by: NURSE ANESTHETIST, CERTIFIED REGISTERED

## 2019-07-17 PROCEDURE — 63600175 PHARM REV CODE 636 W HCPCS: Performed by: PHYSICIAN ASSISTANT

## 2019-07-17 PROCEDURE — 22585 PR ARTHRODESIS ANT INTERBODY MIN DISCECTOMY,EA ADDL: ICD-10-PCS | Mod: AS,,, | Performed by: PHYSICIAN ASSISTANT

## 2019-07-17 PROCEDURE — 87070 CULTURE OTHR SPECIMN AEROBIC: CPT

## 2019-07-17 PROCEDURE — 36415 COLL VENOUS BLD VENIPUNCTURE: CPT

## 2019-07-17 PROCEDURE — 36000711: Performed by: NEUROLOGICAL SURGERY

## 2019-07-17 PROCEDURE — 20936 SP BONE AGRFT LOCAL ADD-ON: CPT | Mod: ,,, | Performed by: NEUROLOGICAL SURGERY

## 2019-07-17 PROCEDURE — 71000033 HC RECOVERY, INTIAL HOUR: Performed by: NEUROLOGICAL SURGERY

## 2019-07-17 PROCEDURE — 87205 SMEAR GRAM STAIN: CPT

## 2019-07-17 PROCEDURE — 63308 PR REMV VERT,EACH ADDNL SEGMENT: ICD-10-PCS | Mod: ,,, | Performed by: NEUROLOGICAL SURGERY

## 2019-07-17 PROCEDURE — 22554 ARTHRD ANT NTRBD MIN DSC CRV: CPT | Mod: AS,51,, | Performed by: PHYSICIAN ASSISTANT

## 2019-07-17 PROCEDURE — 37000008 HC ANESTHESIA 1ST 15 MINUTES: Performed by: NEUROLOGICAL SURGERY

## 2019-07-17 PROCEDURE — 88305 TISSUE EXAM BY PATHOLOGIST: CPT | Performed by: PATHOLOGY

## 2019-07-17 PROCEDURE — 22854 PR INS BIOMECH DEV, VERT CORPECTOMY W/INTRBODY ARTHRODESIS EA INTERSPC: ICD-10-PCS | Mod: ,,, | Performed by: NEUROLOGICAL SURGERY

## 2019-07-17 PROCEDURE — 87077 CULTURE AEROBIC IDENTIFY: CPT

## 2019-07-17 PROCEDURE — 63300 REMOVE VERT XDRL BODY CRVCL: CPT | Mod: AS,,, | Performed by: PHYSICIAN ASSISTANT

## 2019-07-17 PROCEDURE — 88311 TISSUE SPECIMEN TO PATHOLOGY - SURGERY: ICD-10-PCS | Mod: 26,,, | Performed by: PATHOLOGY

## 2019-07-17 PROCEDURE — 25000003 PHARM REV CODE 250: Performed by: PHYSICIAN ASSISTANT

## 2019-07-17 PROCEDURE — 87186 SC STD MICRODIL/AGAR DIL: CPT

## 2019-07-17 PROCEDURE — 25000003 PHARM REV CODE 250: Performed by: NURSE ANESTHETIST, CERTIFIED REGISTERED

## 2019-07-17 PROCEDURE — 22854 INSJ BIOMECHANICAL DEVICE: CPT | Mod: ,,, | Performed by: NEUROLOGICAL SURGERY

## 2019-07-17 PROCEDURE — 36000710: Performed by: NEUROLOGICAL SURGERY

## 2019-07-17 PROCEDURE — 22846 PR ANTERIOR INSTRUMENTATION 4-7 VERTEBRAL SEGMENTS: ICD-10-PCS | Mod: AS,59,, | Performed by: PHYSICIAN ASSISTANT

## 2019-07-17 PROCEDURE — 20930 PR ALLOGRAFT FOR SPINE SURGERY ONLY MORSELIZED: ICD-10-PCS | Mod: ,,, | Performed by: NEUROLOGICAL SURGERY

## 2019-07-17 PROCEDURE — 63600175 PHARM REV CODE 636 W HCPCS: Performed by: EMERGENCY MEDICINE

## 2019-07-17 PROCEDURE — 22846 INSERT SPINE FIXATION DEVICE: CPT | Mod: 59,,, | Performed by: NEUROLOGICAL SURGERY

## 2019-07-17 PROCEDURE — 22854 PR INS BIOMECH DEV, VERT CORPECTOMY W/INTRBODY ARTHRODESIS EA INTERSPC: ICD-10-PCS | Mod: AS,,, | Performed by: PHYSICIAN ASSISTANT

## 2019-07-17 PROCEDURE — 25000003 PHARM REV CODE 250: Performed by: EMERGENCY MEDICINE

## 2019-07-17 PROCEDURE — 87116 MYCOBACTERIA CULTURE: CPT

## 2019-07-17 PROCEDURE — 63300 PR REMV VERT,EXDUR,CERV TUMOR: ICD-10-PCS | Mod: AS,,, | Performed by: PHYSICIAN ASSISTANT

## 2019-07-17 PROCEDURE — 22846 INSERT SPINE FIXATION DEVICE: CPT | Mod: AS,59,, | Performed by: PHYSICIAN ASSISTANT

## 2019-07-17 PROCEDURE — 94799 UNLISTED PULMONARY SVC/PX: CPT

## 2019-07-17 PROCEDURE — 63300 REMOVE VERT XDRL BODY CRVCL: CPT | Mod: ,,, | Performed by: NEUROLOGICAL SURGERY

## 2019-07-17 PROCEDURE — 22585 ARTHRD ANT NTRBD MIN DSC EA: CPT | Mod: ,,, | Performed by: NEUROLOGICAL SURGERY

## 2019-07-17 PROCEDURE — 99900031 HC PATIENT EDUCATION (STAT)

## 2019-07-17 PROCEDURE — 20930 SP BONE ALGRFT MORSEL ADD-ON: CPT | Mod: ,,, | Performed by: NEUROLOGICAL SURGERY

## 2019-07-17 PROCEDURE — 85025 COMPLETE CBC W/AUTO DIFF WBC: CPT

## 2019-07-17 PROCEDURE — 80202 ASSAY OF VANCOMYCIN: CPT

## 2019-07-17 PROCEDURE — 25000003 PHARM REV CODE 250: Performed by: NEUROLOGICAL SURGERY

## 2019-07-17 PROCEDURE — 88305 TISSUE EXAM BY PATHOLOGIST: CPT | Mod: 26,,, | Performed by: PATHOLOGY

## 2019-07-17 PROCEDURE — C1713 ANCHOR/SCREW BN/BN,TIS/BN: HCPCS | Performed by: NEUROLOGICAL SURGERY

## 2019-07-17 PROCEDURE — 87102 FUNGUS ISOLATION CULTURE: CPT

## 2019-07-17 PROCEDURE — 37000009 HC ANESTHESIA EA ADD 15 MINS: Performed by: NEUROLOGICAL SURGERY

## 2019-07-17 PROCEDURE — 63308 PR REMV VERT,EACH ADDNL SEGMENT: ICD-10-PCS | Mod: AS,,, | Performed by: PHYSICIAN ASSISTANT

## 2019-07-17 PROCEDURE — 80048 BASIC METABOLIC PNL TOTAL CA: CPT

## 2019-07-17 PROCEDURE — 87176 TISSUE HOMOGENIZATION CULTR: CPT

## 2019-07-17 PROCEDURE — 27201423 OPTIME MED/SURG SUP & DEVICES STERILE SUPPLY: Performed by: NEUROLOGICAL SURGERY

## 2019-07-17 PROCEDURE — 22554 PR ARTHRODESIS ANT INTERBODY MIN DISCECTOMY, CERVICAL BELOW C2: ICD-10-PCS | Mod: AS,51,, | Performed by: PHYSICIAN ASSISTANT

## 2019-07-17 PROCEDURE — 20936 PR AUTOGRAFT SPINE SURGERY LOCAL FROM SAME INCISION: ICD-10-PCS | Mod: ,,, | Performed by: NEUROLOGICAL SURGERY

## 2019-07-17 PROCEDURE — 22585 PR ARTHRODESIS ANT INTERBODY MIN DISCECTOMY,EA ADDL: ICD-10-PCS | Mod: ,,, | Performed by: NEUROLOGICAL SURGERY

## 2019-07-17 PROCEDURE — 63600175 PHARM REV CODE 636 W HCPCS: Performed by: ANESTHESIOLOGY

## 2019-07-17 PROCEDURE — 27800903 OPTIME MED/SURG SUP & DEVICES OTHER IMPLANTS: Performed by: NEUROLOGICAL SURGERY

## 2019-07-17 PROCEDURE — 63308 REMOVE VERTEBRAL BODY ADD-ON: CPT | Mod: ,,, | Performed by: NEUROLOGICAL SURGERY

## 2019-07-17 PROCEDURE — 87075 CULTR BACTERIA EXCEPT BLOOD: CPT

## 2019-07-17 PROCEDURE — 87206 SMEAR FLUORESCENT/ACID STAI: CPT

## 2019-07-17 PROCEDURE — S0073 INJECTION, AZTREONAM, 500 MG: HCPCS | Performed by: PHYSICIAN ASSISTANT

## 2019-07-17 PROCEDURE — 22554 PR ARTHRODESIS ANT INTERBODY MIN DISCECTOMY, CERVICAL BELOW C2: ICD-10-PCS | Mod: 51,,, | Performed by: NEUROLOGICAL SURGERY

## 2019-07-17 PROCEDURE — 63300 PR REMV VERT,EXDUR,CERV TUMOR: ICD-10-PCS | Mod: ,,, | Performed by: NEUROLOGICAL SURGERY

## 2019-07-17 PROCEDURE — 88305 TISSUE SPECIMEN TO PATHOLOGY - SURGERY: ICD-10-PCS | Mod: 26,,, | Performed by: PATHOLOGY

## 2019-07-17 PROCEDURE — 63600175 PHARM REV CODE 636 W HCPCS: Performed by: NEUROLOGICAL SURGERY

## 2019-07-17 PROCEDURE — S0073 INJECTION, AZTREONAM, 500 MG: HCPCS | Performed by: INTERNAL MEDICINE

## 2019-07-17 PROCEDURE — 99232 SBSQ HOSP IP/OBS MODERATE 35: CPT | Mod: 57,,, | Performed by: NEUROLOGICAL SURGERY

## 2019-07-17 PROCEDURE — 99232 PR SUBSEQUENT HOSPITAL CARE,LEVL II: ICD-10-PCS | Mod: 57,,, | Performed by: NEUROLOGICAL SURGERY

## 2019-07-17 PROCEDURE — 22585 ARTHRD ANT NTRBD MIN DSC EA: CPT | Mod: AS,,, | Performed by: PHYSICIAN ASSISTANT

## 2019-07-17 PROCEDURE — 22554 ARTHRD ANT NTRBD MIN DSC CRV: CPT | Mod: 51,,, | Performed by: NEUROLOGICAL SURGERY

## 2019-07-17 PROCEDURE — 63308 REMOVE VERTEBRAL BODY ADD-ON: CPT | Mod: AS,,, | Performed by: PHYSICIAN ASSISTANT

## 2019-07-17 PROCEDURE — 25000003 PHARM REV CODE 250: Performed by: INTERNAL MEDICINE

## 2019-07-17 PROCEDURE — 22854 INSJ BIOMECHANICAL DEVICE: CPT | Mod: AS,,, | Performed by: PHYSICIAN ASSISTANT

## 2019-07-17 PROCEDURE — 63600175 PHARM REV CODE 636 W HCPCS: Performed by: NURSE PRACTITIONER

## 2019-07-17 PROCEDURE — 22846 PR ANTERIOR INSTRUMENTATION 4-7 VERTEBRAL SEGMENTS: ICD-10-PCS | Mod: 59,,, | Performed by: NEUROLOGICAL SURGERY

## 2019-07-17 PROCEDURE — C1729 CATH, DRAINAGE: HCPCS | Performed by: NEUROLOGICAL SURGERY

## 2019-07-17 PROCEDURE — 88311 DECALCIFY TISSUE: CPT | Mod: 26,,, | Performed by: PATHOLOGY

## 2019-07-17 PROCEDURE — 11000001 HC ACUTE MED/SURG PRIVATE ROOM

## 2019-07-17 RX ORDER — HYDROMORPHONE HYDROCHLORIDE 1 MG/ML
1 INJECTION, SOLUTION INTRAMUSCULAR; INTRAVENOUS; SUBCUTANEOUS
Status: DISCONTINUED | OUTPATIENT
Start: 2019-07-17 | End: 2019-07-25 | Stop reason: HOSPADM

## 2019-07-17 RX ORDER — BACITRACIN 50000 [IU]/1
INJECTION, POWDER, FOR SOLUTION INTRAMUSCULAR
Status: DISCONTINUED | OUTPATIENT
Start: 2019-07-17 | End: 2019-07-17 | Stop reason: HOSPADM

## 2019-07-17 RX ORDER — VANCOMYCIN HCL IN 5 % DEXTROSE 1.5G/250ML
1500 PLASTIC BAG, INJECTION (ML) INTRAVENOUS
Status: DISCONTINUED | OUTPATIENT
Start: 2019-07-17 | End: 2019-07-17

## 2019-07-17 RX ORDER — MAG HYDROX/ALUMINUM HYD/SIMETH 200-200-20
30 SUSPENSION, ORAL (FINAL DOSE FORM) ORAL EVERY 4 HOURS PRN
Status: DISCONTINUED | OUTPATIENT
Start: 2019-07-17 | End: 2019-07-25 | Stop reason: HOSPADM

## 2019-07-17 RX ORDER — OXYCODONE HYDROCHLORIDE 5 MG/1
15 TABLET ORAL EVERY 4 HOURS PRN
Status: DISCONTINUED | OUTPATIENT
Start: 2019-07-17 | End: 2019-07-25 | Stop reason: HOSPADM

## 2019-07-17 RX ORDER — HYDROMORPHONE HYDROCHLORIDE 2 MG/ML
0.2 INJECTION, SOLUTION INTRAMUSCULAR; INTRAVENOUS; SUBCUTANEOUS EVERY 5 MIN PRN
Status: DISCONTINUED | OUTPATIENT
Start: 2019-07-17 | End: 2019-07-17

## 2019-07-17 RX ORDER — METOCLOPRAMIDE HYDROCHLORIDE 5 MG/ML
10 INJECTION INTRAMUSCULAR; INTRAVENOUS EVERY 10 MIN PRN
Status: DISCONTINUED | OUTPATIENT
Start: 2019-07-17 | End: 2019-07-17

## 2019-07-17 RX ORDER — LIDOCAINE HYDROCHLORIDE 10 MG/ML
INJECTION, SOLUTION EPIDURAL; INFILTRATION; INTRACAUDAL; PERINEURAL
Status: DISCONTINUED | OUTPATIENT
Start: 2019-07-17 | End: 2019-07-17

## 2019-07-17 RX ORDER — DIPHENHYDRAMINE HYDROCHLORIDE 50 MG/ML
25 INJECTION INTRAMUSCULAR; INTRAVENOUS EVERY 6 HOURS PRN
Status: DISCONTINUED | OUTPATIENT
Start: 2019-07-17 | End: 2019-07-17

## 2019-07-17 RX ORDER — OXYCODONE AND ACETAMINOPHEN 7.5; 325 MG/1; MG/1
1 TABLET ORAL EVERY 4 HOURS PRN
Status: DISCONTINUED | OUTPATIENT
Start: 2019-07-17 | End: 2019-07-24

## 2019-07-17 RX ORDER — OXYCODONE AND ACETAMINOPHEN 5; 325 MG/1; MG/1
1 TABLET ORAL EVERY 4 HOURS PRN
Status: DISCONTINUED | OUTPATIENT
Start: 2019-07-17 | End: 2019-07-25 | Stop reason: HOSPADM

## 2019-07-17 RX ORDER — SUCCINYLCHOLINE CHLORIDE 20 MG/ML
INJECTION INTRAMUSCULAR; INTRAVENOUS
Status: DISCONTINUED | OUTPATIENT
Start: 2019-07-17 | End: 2019-07-17

## 2019-07-17 RX ORDER — AMOXICILLIN 250 MG
2 CAPSULE ORAL NIGHTLY PRN
Status: DISCONTINUED | OUTPATIENT
Start: 2019-07-17 | End: 2019-07-25 | Stop reason: HOSPADM

## 2019-07-17 RX ORDER — ACETAMINOPHEN 10 MG/ML
1000 INJECTION, SOLUTION INTRAVENOUS ONCE
Status: DISCONTINUED | OUTPATIENT
Start: 2019-07-17 | End: 2019-07-17

## 2019-07-17 RX ORDER — FENTANYL CITRATE 50 UG/ML
INJECTION, SOLUTION INTRAMUSCULAR; INTRAVENOUS
Status: DISCONTINUED | OUTPATIENT
Start: 2019-07-17 | End: 2019-07-17

## 2019-07-17 RX ORDER — SODIUM CHLORIDE, SODIUM LACTATE, POTASSIUM CHLORIDE, CALCIUM CHLORIDE 600; 310; 30; 20 MG/100ML; MG/100ML; MG/100ML; MG/100ML
INJECTION, SOLUTION INTRAVENOUS CONTINUOUS PRN
Status: DISCONTINUED | OUTPATIENT
Start: 2019-07-17 | End: 2019-07-17

## 2019-07-17 RX ORDER — MIDAZOLAM HYDROCHLORIDE 1 MG/ML
INJECTION, SOLUTION INTRAMUSCULAR; INTRAVENOUS
Status: DISCONTINUED | OUTPATIENT
Start: 2019-07-17 | End: 2019-07-17

## 2019-07-17 RX ORDER — VANCOMYCIN HYDROCHLORIDE 1 G/20ML
INJECTION, POWDER, LYOPHILIZED, FOR SOLUTION INTRAVENOUS
Status: DISCONTINUED | OUTPATIENT
Start: 2019-07-17 | End: 2019-07-17 | Stop reason: HOSPADM

## 2019-07-17 RX ORDER — DOCUSATE SODIUM 100 MG/1
100 CAPSULE, LIQUID FILLED ORAL DAILY
Status: DISCONTINUED | OUTPATIENT
Start: 2019-07-17 | End: 2019-07-25 | Stop reason: HOSPADM

## 2019-07-17 RX ORDER — DIPHENHYDRAMINE HCL 25 MG
50 CAPSULE ORAL EVERY 6 HOURS PRN
Status: DISCONTINUED | OUTPATIENT
Start: 2019-07-17 | End: 2019-07-25 | Stop reason: HOSPADM

## 2019-07-17 RX ORDER — SODIUM CHLORIDE 0.9 % (FLUSH) 0.9 %
3 SYRINGE (ML) INJECTION
Status: DISCONTINUED | OUTPATIENT
Start: 2019-07-17 | End: 2019-07-17

## 2019-07-17 RX ORDER — HYDROMORPHONE HYDROCHLORIDE 2 MG/ML
2 INJECTION, SOLUTION INTRAMUSCULAR; INTRAVENOUS; SUBCUTANEOUS
Status: DISCONTINUED | OUTPATIENT
Start: 2019-07-17 | End: 2019-07-25 | Stop reason: HOSPADM

## 2019-07-17 RX ORDER — ONDANSETRON 2 MG/ML
INJECTION INTRAMUSCULAR; INTRAVENOUS
Status: DISCONTINUED | OUTPATIENT
Start: 2019-07-17 | End: 2019-07-17

## 2019-07-17 RX ORDER — PROPOFOL 10 MG/ML
VIAL (ML) INTRAVENOUS CONTINUOUS PRN
Status: DISCONTINUED | OUTPATIENT
Start: 2019-07-17 | End: 2019-07-17

## 2019-07-17 RX ORDER — PROMETHAZINE HYDROCHLORIDE 25 MG/1
25 TABLET ORAL EVERY 6 HOURS PRN
Status: DISCONTINUED | OUTPATIENT
Start: 2019-07-17 | End: 2019-07-25 | Stop reason: HOSPADM

## 2019-07-17 RX ORDER — ROCURONIUM BROMIDE 10 MG/ML
INJECTION, SOLUTION INTRAVENOUS
Status: DISCONTINUED | OUTPATIENT
Start: 2019-07-17 | End: 2019-07-17

## 2019-07-17 RX ORDER — MORPHINE SULFATE 10 MG/ML
INJECTION, SOLUTION INTRAMUSCULAR; INTRAVENOUS
Status: DISCONTINUED | OUTPATIENT
Start: 2019-07-17 | End: 2019-07-17

## 2019-07-17 RX ORDER — DIAZEPAM 5 MG/1
5 TABLET ORAL EVERY 6 HOURS PRN
Status: DISCONTINUED | OUTPATIENT
Start: 2019-07-17 | End: 2019-07-25 | Stop reason: HOSPADM

## 2019-07-17 RX ORDER — VANCOMYCIN HCL IN 5 % DEXTROSE 1G/250ML
1000 PLASTIC BAG, INJECTION (ML) INTRAVENOUS
Status: DISCONTINUED | OUTPATIENT
Start: 2019-07-17 | End: 2019-07-19

## 2019-07-17 RX ORDER — PROPOFOL 10 MG/ML
VIAL (ML) INTRAVENOUS
Status: DISCONTINUED | OUTPATIENT
Start: 2019-07-17 | End: 2019-07-17

## 2019-07-17 RX ORDER — MEPERIDINE HYDROCHLORIDE 50 MG/ML
12.5 INJECTION INTRAMUSCULAR; INTRAVENOUS; SUBCUTANEOUS ONCE AS NEEDED
Status: COMPLETED | OUTPATIENT
Start: 2019-07-17 | End: 2019-07-17

## 2019-07-17 RX ORDER — BACITRACIN ZINC 500 UNIT/G
OINTMENT (GRAM) TOPICAL
Status: DISCONTINUED | OUTPATIENT
Start: 2019-07-17 | End: 2019-07-17 | Stop reason: HOSPADM

## 2019-07-17 RX ADMIN — FENTANYL CITRATE 50 MCG: 50 INJECTION, SOLUTION INTRAMUSCULAR; INTRAVENOUS at 11:07

## 2019-07-17 RX ADMIN — HYDROMORPHONE HYDROCHLORIDE 0.2 MG: 2 INJECTION, SOLUTION INTRAMUSCULAR; INTRAVENOUS; SUBCUTANEOUS at 11:07

## 2019-07-17 RX ADMIN — ROCURONIUM BROMIDE 5 MG: 10 INJECTION, SOLUTION INTRAVENOUS at 07:07

## 2019-07-17 RX ADMIN — THERA TABS 1 TABLET: TAB at 01:07

## 2019-07-17 RX ADMIN — DEXAMETHASONE SODIUM PHOSPHATE 4 MG: 4 INJECTION, SOLUTION INTRA-ARTICULAR; INTRALESIONAL; INTRAMUSCULAR; INTRAVENOUS; SOFT TISSUE at 11:07

## 2019-07-17 RX ADMIN — FENTANYL CITRATE 100 MCG: 50 INJECTION, SOLUTION INTRAMUSCULAR; INTRAVENOUS at 07:07

## 2019-07-17 RX ADMIN — MORPHINE SULFATE 5 MG: 10 INJECTION INTRAMUSCULAR; INTRAVENOUS; SUBCUTANEOUS at 11:07

## 2019-07-17 RX ADMIN — PROPOFOL 100 MG: 10 INJECTION, EMULSION INTRAVENOUS at 07:07

## 2019-07-17 RX ADMIN — AZTREONAM 1000 MG: 1 INJECTION, POWDER, LYOPHILIZED, FOR SOLUTION INTRAMUSCULAR; INTRAVENOUS at 09:07

## 2019-07-17 RX ADMIN — DOCUSATE SODIUM 100 MG: 100 CAPSULE, LIQUID FILLED ORAL at 01:07

## 2019-07-17 RX ADMIN — VANCOMYCIN HYDROCHLORIDE 1000 MG: 1 INJECTION, POWDER, FOR SOLUTION INTRAVENOUS at 11:07

## 2019-07-17 RX ADMIN — FENTANYL CITRATE 100 MCG: 50 INJECTION, SOLUTION INTRAMUSCULAR; INTRAVENOUS at 10:07

## 2019-07-17 RX ADMIN — DEXAMETHASONE SODIUM PHOSPHATE 4 MG: 4 INJECTION, SOLUTION INTRA-ARTICULAR; INTRALESIONAL; INTRAMUSCULAR; INTRAVENOUS; SOFT TISSUE at 06:07

## 2019-07-17 RX ADMIN — PROPOFOL 100 MCG/KG/MIN: 10 INJECTION, EMULSION INTRAVENOUS at 07:07

## 2019-07-17 RX ADMIN — DEXAMETHASONE SODIUM PHOSPHATE 4 MG: 4 INJECTION, SOLUTION INTRA-ARTICULAR; INTRALESIONAL; INTRAMUSCULAR; INTRAVENOUS; SOFT TISSUE at 05:07

## 2019-07-17 RX ADMIN — VANCOMYCIN HYDROCHLORIDE 1000 MG: 1 INJECTION, POWDER, LYOPHILIZED, FOR SOLUTION INTRAVENOUS at 06:07

## 2019-07-17 RX ADMIN — RAMELTEON 8 MG: 8 TABLET, FILM COATED ORAL at 11:07

## 2019-07-17 RX ADMIN — LIDOCAINE HYDROCHLORIDE 50 MG: 10 INJECTION, SOLUTION EPIDURAL; INFILTRATION; INTRACAUDAL; PERINEURAL at 07:07

## 2019-07-17 RX ADMIN — MEPERIDINE HYDROCHLORIDE 12.5 MG: 50 INJECTION INTRAMUSCULAR; INTRAVENOUS; SUBCUTANEOUS at 11:07

## 2019-07-17 RX ADMIN — ONDANSETRON 4 MG: 2 INJECTION, SOLUTION INTRAMUSCULAR; INTRAVENOUS at 11:07

## 2019-07-17 RX ADMIN — DEXAMETHASONE SODIUM PHOSPHATE 4 MG: 4 INJECTION, SOLUTION INTRA-ARTICULAR; INTRALESIONAL; INTRAMUSCULAR; INTRAVENOUS; SOFT TISSUE at 01:07

## 2019-07-17 RX ADMIN — VANCOMYCIN HYDROCHLORIDE 1000 MG: 1 INJECTION, POWDER, FOR SOLUTION INTRAVENOUS at 02:07

## 2019-07-17 RX ADMIN — AZTREONAM 1000 MG: 1 INJECTION, POWDER, LYOPHILIZED, FOR SOLUTION INTRAMUSCULAR; INTRAVENOUS at 03:07

## 2019-07-17 RX ADMIN — HYDROMORPHONE HYDROCHLORIDE 0.2 MG: 2 INJECTION, SOLUTION INTRAMUSCULAR; INTRAVENOUS; SUBCUTANEOUS at 12:07

## 2019-07-17 RX ADMIN — AZTREONAM 1000 MG: 1 INJECTION, POWDER, LYOPHILIZED, FOR SOLUTION INTRAMUSCULAR; INTRAVENOUS at 01:07

## 2019-07-17 RX ADMIN — SUCCINYLCHOLINE CHLORIDE 120 MG: 20 INJECTION, SOLUTION INTRAMUSCULAR; INTRAVENOUS at 07:07

## 2019-07-17 RX ADMIN — PROPOFOL 200 MG: 10 INJECTION, EMULSION INTRAVENOUS at 07:07

## 2019-07-17 RX ADMIN — MIDAZOLAM 2 MG: 1 INJECTION INTRAMUSCULAR; INTRAVENOUS at 07:07

## 2019-07-17 RX ADMIN — SODIUM CHLORIDE, SODIUM LACTATE, POTASSIUM CHLORIDE, AND CALCIUM CHLORIDE: 600; 310; 30; 20 INJECTION, SOLUTION INTRAVENOUS at 07:07

## 2019-07-17 RX ADMIN — OXYCODONE HYDROCHLORIDE AND ACETAMINOPHEN 1 TABLET: 7.5; 325 TABLET ORAL at 09:07

## 2019-07-17 RX ADMIN — FAMOTIDINE 20 MG: 20 TABLET ORAL at 09:07

## 2019-07-17 RX ADMIN — FENTANYL CITRATE 150 MCG: 50 INJECTION, SOLUTION INTRAMUSCULAR; INTRAVENOUS at 07:07

## 2019-07-17 RX ADMIN — ROCURONIUM BROMIDE 45 MG: 10 INJECTION, SOLUTION INTRAVENOUS at 08:07

## 2019-07-17 NOTE — PROGRESS NOTES
[facility] Hospital Medicine  Progress Note    Patient Name: Alex Soares  MRN: [id number]  Patient Class: IP- Inpatient   Admission Date: 7/15/2019  Length of Stay: 2 days  Attending Physician: Elayne Akhtar MD  Primary Care Provider: Simi Macdonald MD        Subjective:     Principal Problem:Cervical spinal cord compression      HPI:  28-year-old male with history of IV drug use, last use of heroin about 2 weeks ago, and tattoos, last recent tattoo 4 weeks ago; presents today as a transfer from outside freestanding ER for neck pain. Patient reports he has been having neck pain over the past month and has been seen outpatient but has not improved.  Patient reports playing with the dog earlier which exacerbated his pain and why he presented to the emergency room.  Patient was evaluated in the ER MRI performed and shows a cervical C6-7 with surrounding vertebral body osteomyelitis. Neurosurgery was consulted by ER.  Vancomycin was started.  Hospital Medicine consulted patient admitted.  Patient seen and examined on arrival.  Cervical collar in place on arrival.  Patient with allergies to cephalosporin reports hives in past.  Patient with increased risk of MRSA/MSSA will cover with both vancomycin and meropenem (given patient allergy).    Overview/Hospital Course:  28-year-old male with history of IV drug use, last use of heroin - a day prior to presentation .  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  No definite cord edema at this time.  Postcontrast imaging may be obtained as needed.  2. Vertebral body osteomyelitis of C5, C6, C7, and T1.  3. Anterior paraspinal phlegmon/abscess extending from mid C2 to mid T1    07/17 -s/p Cervical spine decompression         Interval History: 28-year-old male with history of IV drug use, last use of heroin - a day prior to presentation  .  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  No definite cord edema at this time.  Postcontrast imaging may be obtained as needed.  2. Vertebral body osteomyelitis of C5, C6, C7, and T1.  3. Anterior paraspinal phlegmon/abscess extending from mid C2 to mid T1  Review of Systems   Constitutional: Negative for chills, diaphoresis, fatigue and fever.   HENT: Negative for congestion, sore throat and voice change.    Eyes: Negative for photophobia and visual disturbance.   Respiratory: Negative for cough, shortness of breath, wheezing and stridor.    Cardiovascular: Negative for chest pain and leg swelling.   Gastrointestinal: Negative for abdominal distention, abdominal pain, constipation, diarrhea, nausea and vomiting.   Endocrine: Negative for polydipsia, polyphagia and polyuria.   Genitourinary: Negative for difficulty urinating, dysuria, flank pain, testicular pain and urgency.   Musculoskeletal: Positive for back pain and neck pain. Negative for joint swelling and neck stiffness.   Skin: Negative for color change and rash.   Allergic/Immunologic: Negative for immunocompromised state.   Neurological: Negative for dizziness, syncope, weakness, numbness and headaches.   Hematological: Does not bruise/bleed easily.   Psychiatric/Behavioral: Negative for agitation, behavioral problems and confusion.     Objective:     Vital Signs (Most Recent):  Temp: 98.4 °F (36.9 °C) (07/17/19 1559)  Pulse: 77 (07/17/19 1559)  Resp: 16 (07/17/19 1559)  BP: 122/72 (07/17/19 1559)  SpO2: 99 % (07/17/19 1559) Vital Signs (24h Range):  Temp:  [97.7 °F (36.5 °C)-98.6 °F (37 °C)] 98.4 °F (36.9 °C)  Pulse:  [] 77  Resp:  [14-16] 16  SpO2:  [96 %-100 %] 99 %  BP: ()/(50-78) 122/72     Weight: 66.1 kg (145 lb 11.6 oz)  Body mass index is 22.09 kg/m².    Intake/Output Summary (Last 24 hours) at 7/17/2019 2155  Last data  filed at 7/17/2019 1128  Gross per 24 hour   Intake 2360 ml   Output 450 ml   Net 1910 ml      Physical Exam   Constitutional: He is oriented to person, place, and time. He appears well-developed and well-nourished. No distress.   HENT:   Head: Normocephalic and atraumatic.   Nose: Nose normal.   Eyes: Pupils are equal, round, and reactive to light. Conjunctivae and EOM are normal. No scleral icterus.   Neck: Normal range of motion. Neck supple. No tracheal deviation present.   Cardiovascular: Normal rate, regular rhythm, normal heart sounds and intact distal pulses.   No murmur heard.  Pulmonary/Chest: Effort normal and breath sounds normal. No stridor. No respiratory distress. He has no wheezes. He has no rales.   Abdominal: Soft. Bowel sounds are normal. He exhibits no distension. There is no tenderness. There is no guarding.   Genitourinary:   Genitourinary Comments: ua neg  uds +   Musculoskeletal: Normal range of motion. He exhibits no edema or deformity.   Cervical collar in place  Unsteady gait  General ext weakness 4/5 to all 4.    Neurological: He is alert and oriented to person, place, and time. No cranial nerve deficit.   Skin: Skin is warm and dry. Capillary refill takes less than 2 seconds. No rash noted. He is not diaphoretic.   Psychiatric: He has a normal mood and affect. His behavior is normal. Judgment and thought content normal.   Nursing note and vitals reviewed.      Significant Labs:   Blood Culture:   No results for input(s): LABBLOO in the last 48 hours.  BMP:   Recent Labs   Lab 07/16/19  0421 07/17/19  0519   * 139*    140   K 4.2 4.0    106   CO2 25 23   BUN 10 14   CREATININE 0.8 0.7   CALCIUM 9.7 9.6   MG 2.0  --      All pertinent labs within the past 24 hours have been reviewed.    Significant Imaging: I have reviewed and interpreted all pertinent imaging results/findings within the past 24 hours.      Assessment/Plan:      * Cervical spinal cord compression  S/P  cervical spine decompression       Abscess in epidural space of cervical spine    07/17- Incision and drainage     Sepsis  Related to above.   Continue vanc and meropenem  Id consult  Neurosurgery consult  Further evaluation/diagnostics/interventions/consults pending course         Elevated BP without diagnosis of hypertension  Monitor consider daily therapy pending course  Further evaluation/diagnostics/interventions/consults pending course         Hypokalemia  borderline  Monitor and supplement as need      IV drug user  Vancomycin and meropenem.  Consider addition of nafcillin pending course.  Id consult  Check echo  The patient was counseled on the dangers of use and encouraged to quit.  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  Patient does have some mild Neuro impairment with unsteady gait and generalized weakness all extremities on my exam and I have notified Neuro surgery at this time.  Awaiting their response/recommendations.  Patient was started on vancomycin in ED will increase coverage with meropenem.  Given his allergy and findings of osteo on MRI.  Consider addition of nafcillin to regimen pending course.    Will consult ID  Neuro checks  Further evaluation/diagnostics/interventions/consults pending course     07/16- will continue vancomycin / and use Aztreonam -will use cultures to guide therapy       Spondylodiscitis  Vanc/meropenum  Neurosurgery managing.   Cervical collar inplace  Further evaluation/diagnostics/interventions/consults pending course     07/17-  continue Vancomycin/ aztreonam           VTE Risk Mitigation (From admission, onward)        Ordered     IP VTE HIGH RISK PATIENT  Once      07/17/19 1247     Place sequential compression device  Until  discontinued      07/17/19 1247     Place sequential compression device  Until discontinued      07/15/19 5499                Elayne Akhtar MD  Department of Hospital Medicine   [facility]

## 2019-07-17 NOTE — CONSULTS
[facility] -   Neurosurgery  Consult Note    Inpatient consult to Neurosurgery  Consult performed by: Sánchez Jeong MD  Consult ordered by: Murray Allen NP  Assessment/Recommendations: Patient osteomyelitis and diskitis with compression deformity at C6 and C7 with posterior compression of the spinal cord.  I have recommended anterior corpectomy at C6-7.  I discussed all the risks and benefits with the patient and family and they have consented and signed consents.    The patient was informed of all benefits and potential risk of the operation including but not limited to:  Pain, infection, bleeding, coma, paralysis, death.  Cerebrospinal fluid leak, failure of any instrumentation, the need for additional procedures in the future. No guarantee was given that this procedure would alleviate all of the symptoms.  Pain with swallowing, dysphagia, hoarseness of voice, cerebrospinal fluid leak.  All of these symptoms are likely transient however may be permanent.  They understood and signed consents in the presence of Critical access hospital this morning.        Subjective:     Chief Complaint/Reason for Admission:  Osteomyelitis and diskitis at C6-7 and C5-6    History of Present Illness:  Patient is a 28-year-old gentleman with a history of IV drug abuse as well as poor dentition.  He presented through an outside ER with progressive weakness in the upper extremities.  X-ray showed compression deformity at C6 and C7 with a posterior compression of the spinal canal.  This was confirmed on both CT scan as well as MRI.  Patient was transferred from an outside ER for evaluation.  Blood cultures were drawn and the patient was placed on prophylactic antibiotics.  Neurosurgery was consulted.  Patient was started on IV Decadron.  Over the course of his hospital stay his strength dramatically improved.  He was able to ambulate with assistance and he regained strength of his upper extremities.  He was placed in a hard cervical  collar for cervical protection.    Through his hospital course he had imaging modality of the cervical thoracic and lumbar spine with and without contrast.  Imaging modality did not show any other complicated lesions in the spine.  The cervical spine showed severe compression with narrowing as well as a prevertebral abscess and posterior compression of the vertebral body due to bony deformity.    No medications prior to admission.       Review of patient's allergies indicates:   Allergen Reactions    Ceclor [cefaclor] Hives    Cefixime Hives    Cefzil [cefprozil] Hives       Past Medical History:   Diagnosis Date    Asthma      Past Surgical History:   Procedure Laterality Date    NO PAST SURGERIES       Family History     None        Tobacco Use    Smoking status: Current Every Day Smoker     Packs/day: 1.00     Types: Cigarettes    Smokeless tobacco: Former User   Substance and Sexual Activity    Alcohol use: Yes     Comment: seldom    Drug use: Yes     Types: IV     Comment: heroin    Sexual activity: Not on file     Review of Systems  Objective:     Weight: 66.1 kg (145 lb 11.6 oz)  Body mass index is 22.09 kg/m².  Vital Signs (Most Recent):  Temp: 97.7 °F (36.5 °C) (07/17/19 0441)  Pulse: 88 (07/17/19 0441)  Resp: 16 (07/17/19 0441)  BP: 128/72 (07/17/19 0441)  SpO2: 99 % (07/17/19 0441) Vital Signs (24h Range):  Temp:  [97.7 °F (36.5 °C)-98.6 °F (37 °C)] 97.7 °F (36.5 °C)  Pulse:  [] 88  Resp:  [16-18] 16  SpO2:  [97 %-99 %] 99 %  BP: (128-152)/(71-86) 128/72                   Neurosurgery Physical Exam    Significant Labs:  Recent Labs   Lab 07/15/19  1438 07/16/19 0421 07/17/19  0519   * 119* 139*    140 140   K 3.4* 4.2 4.0    103 106   CO2 25 25 23   BUN 11 10 14   CREATININE 0.8 0.8 0.7   CALCIUM 8.9 9.7 9.6   MG  --  2.0  --      Recent Labs   Lab 07/15/19  1438 07/16/19  0421 07/17/19  0519   WBC 14.48* 12.48 14.57*   HGB 12.7* 13.6* 13.3*   HCT 39.0* 40.6 39.7*   PLT  300 344 383*     Recent Labs   Lab 07/16/19  0421   INR 1.0   APTT 36.0*     Microbiology Results (last 7 days)     Procedure Component Value Units Date/Time    Blood culture [[id number]] Collected:  07/15/19 1619    Order Status:  Completed Specimen:  Blood from Peripheral, Upper Arm, Left Updated:  07/17/19 0612     Blood Culture, Routine No Growth to date      No Growth to date    Blood culture [[id number]] Collected:  07/15/19 1600    Order Status:  Completed Specimen:  Blood from Peripheral, Upper Arm, Left Updated:  07/17/19 0612     Blood Culture, Routine No Growth to date      No Growth to date          Nursing note and vitals reviewed  Gen:Oriented to person, place, and time.             Appears stated age   Head:Normocephalic and atraumatic.  Nose: Nose normal.    Eyes: EOM are normal. Pupils are equal, round, and reactive to light.   Neck: Neck supple. No masses or lesions palpated  Cardiovascular: Intact distal pulses.    Abdominal: Soft.   Neurological: Alert and oriented to person, place, and time.  No cranial nerve deficit.  Coordination normal. Normal muscle tone  Psychiatric: Normal mood and affect. Behavior is normal.      Gait:   Yes patient with weakness needs assistance for ambulation Antalgic   Yes  Broad based    Used rolling walker for stability Assistive devices    Unable Able to walk on toes and heels without difficulty    Unable  180 degree turn with less than 3 steps     Neck:   Pain to palpation posteriorly Paraspinal tenderness    Spasms noted bilaterally Paraspinal muscle spasms   None  Pain with flexion and extention   WNL  Range of motion shoulders   Neg  Spurling's sign     Motor:   Right Right Left Left  Level Group   5  5  C5 Deltoid   5  5  C6 Bicep   5  5   Wrist extension     4 5 4 C7 Triceps    4 5 4  Wrist flexion    4 5 4 C8     4 5 4 T1 Interossei      Sensation:  NL Decreased (R/L/BL) Level Sensation    X  C5 Lateral upper arm    Decreased to light touch bilaterally C6  Thumb and index finger, lat forearm    Decreased light touch bilaterally C7 Middle finger   X  C8 Ring and little finger   X  T1 Medial arm      Reflex:  2+  Bicep tendon   2+  Brachioradialis   2+  Triceps tendon    Present bilaterally Sanders's    Present Clonus   neg  Tinel's       Back:  None  Paraspinal tenderness   None  Paraspinal muscle spasms   None  Pain with flexion and extention   WNL  Range of motion    Neg  Straight leg raise     Motor:   Right Right Left Left  Level Group   5  5  L2 Hip flexor (Psoas)   5  5  L3 Leg extension (Quads)   5  5  L4 Dorsiflexion & foot inversion (Tibialis Anterior)   5  5  L5 Great toe extension ( EHL)   5  5  S1 Foot eversion (Gastroc, PL & PB)     Sensation:  NL Decreased (R/L/BL) Level Sensation    X  L2 Anterio-medial thigh   X  L3 Medial thigh around knee   X  L4 Medial foot   X  L5 Dorsum foot   X  S1 Lateral foot     Reflex:  2+  Patellar tendon (L4)   2+  Achilles tendon (S1)         All pertinent labs from the last 24 hours have been reviewed.  Significant Diagnostics:  I have reviewed all pertinent imaging results/findings within the past 24 hours.    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 I will follow-up with patient. Please contact us if you have any additional questions.    Sánchez Jeong MD  Neurosurgery  [facility] -

## 2019-07-17 NOTE — ANESTHESIA POSTPROCEDURE EVALUATION
Anesthesia Post Evaluation    Patient: Alex Soares    Procedure(s) Performed: Procedure(s) (LRB):  DECOMPRESSION AND FUSION, SPINE, CERVICAL, ANTERIOR APPROACH (N/A)  CORPECTOMY, SPINE, CERVICAL (N/A)    Final Anesthesia Type: general  Patient location during evaluation: PACU  Patient participation: Yes- Able to Participate  Level of consciousness: awake and alert, oriented and awake  Post-procedure vital signs: reviewed and stable  Pain management: adequate  Airway patency: patent  PONV status at discharge: No PONV  Anesthetic complications: no      Cardiovascular status: blood pressure returned to baseline  Respiratory status: unassisted and spontaneous ventilation  Hydration status: euvolemic  Follow-up not needed.          Vitals Value Taken Time   /59 7/17/2019 11:50 AM   Temp 36.5 °C (97.7 °F) 7/17/2019  4:41 AM   Pulse 93 7/17/2019 11:55 AM   Resp 21 7/17/2019 11:55 AM   SpO2 97 % 7/17/2019 11:55 AM   Vitals shown include unvalidated device data.      No case tracking events are documented in the log.      Pain/Andrae Score: Pain Rating Prior to Med Admin: 6 (7/17/2019 11:44 AM)

## 2019-07-17 NOTE — PLAN OF CARE
Problem: Adult Inpatient Plan of Care  Goal: Plan of Care Review  Outcome: Ongoing (interventions implemented as appropriate)  S/P Decompression fusion with Corpectomy of C6-C7 anterior approach. Tolerating clear liquids. Pain controlled at 3/10. Afebrile. Safety maintained. Monitoring continues.

## 2019-07-17 NOTE — SUBJECTIVE & OBJECTIVE
Interval History: 28-year-old male with history of IV drug use, last use of heroin - a day prior to presentation .  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  No definite cord edema at this time.  Postcontrast imaging may be obtained as needed.  2. Vertebral body osteomyelitis of C5, C6, C7, and T1.  3. Anterior paraspinal phlegmon/abscess extending from mid C2 to mid T1  Review of Systems   Constitutional: Negative for chills, diaphoresis, fatigue and fever.   HENT: Negative for congestion, sore throat and voice change.    Eyes: Negative for photophobia and visual disturbance.   Respiratory: Negative for cough, shortness of breath, wheezing and stridor.    Cardiovascular: Negative for chest pain and leg swelling.   Gastrointestinal: Negative for abdominal distention, abdominal pain, constipation, diarrhea, nausea and vomiting.   Endocrine: Negative for polydipsia, polyphagia and polyuria.   Genitourinary: Negative for difficulty urinating, dysuria, flank pain, testicular pain and urgency.   Musculoskeletal: Positive for back pain and neck pain. Negative for joint swelling and neck stiffness.   Skin: Negative for color change and rash.   Allergic/Immunologic: Negative for immunocompromised state.   Neurological: Negative for dizziness, syncope, weakness, numbness and headaches.   Hematological: Does not bruise/bleed easily.   Psychiatric/Behavioral: Negative for agitation, behavioral problems and confusion.     Objective:     Vital Signs (Most Recent):  Temp: 98.4 °F (36.9 °C) (07/17/19 1559)  Pulse: 77 (07/17/19 1559)  Resp: 16 (07/17/19 1559)  BP: 122/72 (07/17/19 1559)  SpO2: 99 % (07/17/19 1559) Vital Signs (24h Range):  Temp:  [97.7 °F (36.5 °C)-98.6 °F (37 °C)] 98.4 °F (36.9 °C)  Pulse:  [] 77  Resp:  [14-16] 16  SpO2:  [96 %-100 %] 99 %  BP: ()/(50-78) 122/72     Weight: 66.1 kg (145 lb  11.6 oz)  Body mass index is 22.09 kg/m².    Intake/Output Summary (Last 24 hours) at 7/17/2019 1749  Last data filed at 7/17/2019 1128  Gross per 24 hour   Intake 2360 ml   Output 450 ml   Net 1910 ml      Physical Exam   Constitutional: He is oriented to person, place, and time. He appears well-developed and well-nourished. No distress.   HENT:   Head: Normocephalic and atraumatic.   Nose: Nose normal.   Eyes: Pupils are equal, round, and reactive to light. Conjunctivae and EOM are normal. No scleral icterus.   Neck: Normal range of motion. Neck supple. No tracheal deviation present.   Cardiovascular: Normal rate, regular rhythm, normal heart sounds and intact distal pulses.   No murmur heard.  Pulmonary/Chest: Effort normal and breath sounds normal. No stridor. No respiratory distress. He has no wheezes. He has no rales.   Abdominal: Soft. Bowel sounds are normal. He exhibits no distension. There is no tenderness. There is no guarding.   Genitourinary:   Genitourinary Comments: ua neg  uds +   Musculoskeletal: Normal range of motion. He exhibits no edema or deformity.   Cervical collar in place  Unsteady gait  General ext weakness 4/5 to all 4.    Neurological: He is alert and oriented to person, place, and time. No cranial nerve deficit.   Skin: Skin is warm and dry. Capillary refill takes less than 2 seconds. No rash noted. He is not diaphoretic.   Psychiatric: He has a normal mood and affect. His behavior is normal. Judgment and thought content normal.   Nursing note and vitals reviewed.      Significant Labs:   Blood Culture:   No results for input(s): LABBLOO in the last 48 hours.  BMP:   Recent Labs   Lab 07/16/19  0421 07/17/19  0519   * 139*    140   K 4.2 4.0    106   CO2 25 23   BUN 10 14   CREATININE 0.8 0.7   CALCIUM 9.7 9.6   MG 2.0  --      All pertinent labs within the past 24 hours have been reviewed.    Significant Imaging: I have reviewed and interpreted all pertinent imaging  results/findings within the past 24 hours.

## 2019-07-17 NOTE — OP NOTE
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 Cervical corpectomy C6   2. Cervical corpectomy C7   3. Placement of anterior plate with screws C5-T1   4. Cervical arthrodesis C5-T1  5. Placement of PEEK interbody cage    6. Use of allograft bone   7. Drainage of prevertebral abscess   8. 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 Item Serial No.  Lot No. LRB No. Used   PIN DISTRACTION 12MM - SN/A  PIN DISTRACTION 12MM N/A AESCULAP  N/A 1   PIN DISTRACTION 12MM - SN/A  PIN DISTRACTION 12MM N/A AESCULAP  N/A 1   Tommie DBM 3cc   [id number]  N/A N/A 1   CAGE ANATOMIC PTC [id number] - [id number]  CAGE ANATOMIC PTC [id number]  MEDTRONIC [employer] 65GH N/A 1   Medtronic Sofamor Danek Spacer 7mm x 14mm x 11mm     87GP N/A 1   Anatomic Peek Strut 24mm x 14mm x 11mm     05FH N/A 1   Medtronic DBF 1cc   [id number]   N/A 1   Medtronic Spacer 9mm x 14mm x 11mm     24HN N/A 1   Zevo Plate 53mm 3 lvl      N/A 1   Zevo self drill screws 3.5 x 15      N/A 4              Condition: Good    Disposition: PACU - hemodynamically stable.    Attestation: I was present and scrubbed for the entire procedure.     History:  Patient is a 28-year-old male who presented with signs and symptoms of progressive weakness in the upper as well as lower extremities with associated numbness and tingling.  She was worked up with MR which revealed C6 and C7 fracture with evidence of discitis/osteomyelitis.  Because of pain with weakness a cervical corpectomy at C6 and C7  was offered.      Surgical Risks:  The patient was well apprised of all objectives benefits risk and potential complications of the procedure, including but not limited to; worsening of current status, the possible need for further procedures, the risk of infection, headaches, CSF leak, possible spinal cord injury resulting in paralysis, infection, neck hematoma and hoarseness of voice, injury of major vessels causing hemorrhage, stroke, loss of language function, coma and even death.  Informed consent was obtained and secured in the chart after the patient voiced understanding and decided to proceed with the operation.     The patient was transferred onto the operating room  table. The patient was on antibiotics prior to incision.  The patient was sedated and intubated without difficulty by the anesthesia service.  Eyes were taped shut after ointment was applied to prevent corneal abrasion.  A Santo Hugger was placed over the lower body to maintain control of the core body temperature.  A Bray catheter was inserted.  The neuro monitoring service then fixated the electrodes in the appropriate position.  The patient was placed supine with a sheet under the shoulder and a gel roll under the head.  All pressure points were carefully padded.  A soft cervical collar was used with 15 pds of traction      The skin was prepped and draped in the standard surgical fashion.  The planned incision was made using on marker pen as well as fluoroscopy.  A transverse neck incision was performed at approximately the C6 level using a #10 Blade after infiltration with approximately 2 cc of 1% Marcaine with epinephrine.  The incision was deepened through the platysmal muscles and the edges undermined using sharp dissection.  Hemostasis was obtained utilizing the Bovie electrocautery as well as the bipolar forceps.  Further blunt and sharp dissection was carried down in the plane medial to the omohyoid.  Blunt dissection was performed medial to the carotid sheath down to the anterior longitudinal ligament in front of the spine.  The C-arm fluoroscopy unit was draped with sterile drapes and brought into the operative field.  The C5-6, C6-7, and T1 disc spaces were confirmed by placing a marker needle and visualized with fluoroscopy.   The longus coli was undermined on either side using monopolar cautery along to expose the uncovertebral joint.  Self retraining retractors were inserted underneath the longus coli musculature.  There was evidence of bony destruction with infection anterior in the prevertebral space. This tissue was collected and sent for an analysis as well as culture.    A #11 Blade was used to  initiate the diskectomy after incision of the annulus.  The diskectomy was carried out utilizing pituitary rongeurs, [place] and curettes to widen and cleaned the disc space.  The operating microscope was draped with sterile drapes and brought into the operative field and with microsurgical technique; the diskectomy was carried down to the level of the posterior longitudinal ligament and widened laterally to reveal bilateral uncovertebral joints.  Using a high-speed electric drill the posterior osteophytes were removed. The vertebral body at C6 and C7  were removed to complete the corpectomy.  Please note 100 % of the vertebral body of both C6 and C7 were removed for the corpectomy     There was evidence of bony destruction and disc involvement in the vertebral body of C6 and C7  this was sent for permanent pathology and culture .      To achieve arthrodesis the endplates were scraped clean and a 14 x 40 mm Medtronic peek interbody cage was measured to size and inserted.  After sure in a good fit, for arthrodesis, the center of the interbody device was compacted with bone obtained from the corpectomy.  As well as several 4cc of DBM.  The interbody device was inserted without any difficulty.    A 53 mm Medtronic Atlantis plate was inserted anteriorly to the vertebral bodies of C5 and T1 and 4 screws were placed to into the plate which was locked into place.  An AP and lateral x-ray was obtained.   Please note the anterior place spanned from the C5-T1 vertebral bodies     Fluoroscopy was then used to confirm good placement of the cage screws and plate.  The wound was irrigated with bacitracin solution and hemostasis was of trained.  1 g of vancomycin powder was placed to the surgical cavity.  And 1/8 inch Hemovac drain was left in the place in the wounds was closing using 0 Vicryl sutures as well as 2 0 Vicryl sutures on the subcutaneous level.  Dermabond was used on the skin.    Motor,SSEP, EMG were stable  throughout the procedure      Sponge, needle and instrument counts were all accounted for at the end of the case x2.  The patient tolerated the procedure well and was transferred to the recovery room in a stable condition.  I was present for the entire portion of the procedure.

## 2019-07-17 NOTE — ANESTHESIA PREPROCEDURE EVALUATION
07/17/2019  Alex Soares is a 28 y.o., male.    Anesthesia Evaluation    I have reviewed the Patient Summary Reports.    I have reviewed the Nursing Notes.   I have reviewed the Medications.     Review of Systems  Anesthesia Hx:  No problems with previous Anesthesia  History of prior surgery of interest to airway management or planning: Denies Family Hx of Anesthesia complications.   Denies Personal Hx of Anesthesia complications.   Social:  Smoker Uses Heroin. Last used # days ago   Hematology/Oncology:     Oncology Normal    -- Anemia:   Cardiovascular:  Cardiovascular Normal  ECG has been reviewed. Echo 07/19  CONCLUSIONS     1 - Normal left ventricular systolic function (EF 55-60%).     2 - Normal left ventricular diastolic function.     3 - Normal right ventricular systolic function .    Pulmonary:   Asthma    Renal/:  Renal/ Normal     Hepatic/GI:  Hepatic/GI Normal    Musculoskeletal:  Musculoskeletal Normal    Neurological:  Neurology Normal    Endocrine:  Endocrine Normal    Psych:  Psychiatric Normal           Physical Exam  General:  Well nourished    Airway/Jaw/Neck:  Airway Findings: Mouth Opening: Normal Tongue: Normal  General Airway Assessment: Adult  Mallampati: II  TM Distance: Normal, at least 6 cm       Chest/Lungs:  Chest/Lungs Findings: Normal Respiratory Rate     Heart/Vascular:  Heart Findings: Rate: Normal             Anesthesia Plan  Type of Anesthesia, risks & benefits discussed:  Anesthesia Type:  general  Patient's Preference:   Intra-op Monitoring Plan: standard ASA monitors  Intra-op Monitoring Plan Comments:   Post Op Pain Control Plan:   Post Op Pain Control Plan Comments:   Induction:   IV  Beta Blocker:  Patient is not currently on a Beta-Blocker (No further documentation required).       Informed Consent: Patient understands risks and agrees with Anesthesia plan.   Questions answered. Anesthesia consent signed with patient.  ASA Score: 3     Day of Surgery Review of History & Physical:    H&P update referred to the surgeon.         Ready For Surgery From Anesthesia Perspective.

## 2019-07-17 NOTE — PROGRESS NOTES
Pharmacokinetic Assessment Follow Up: IV Vancomycin    Vancomycin serum concentration assessment(s):    The trough level was drawned incorrectly and cannot be used to guide therapy at this time. The measurement is below the desired definitive target range of 15 to 20 mcg/mL.    Vancomycin Regimen Plan:    We will increase the dose to 1,500 mg every 12 hours per pharmacy protocol. The current Vancomycin 1 g dose was given at 0630 before a dose adjustment could be made.   Vancomycin 1,500 mg q 12 hours will begin at 1630 in order to get the patient therapeutic and the next trough level will be collected prior to the 2nd dose of 1,500 mg.     Pharmacy will continue to follow and monitor vancomycin.    Please contact pharmacy at extension [phone] for questions regarding this assessment.    Thank you for the consult,   Murray Ferrari     Patient brief summary:  Alex Soares is a 28 y.o. male initiated on antimicrobial therapy with IV Vancomycin for treatment of suspected bone/joint    The patient received a loading dose, followed by the current treatment regimen: vancomycin 1000 mg IV every 12 hours    Drug Allergies:   Review of patient's allergies indicates:   Allergen Reactions    Ceclor [cefaclor] Hives    Cefixime Hives    Cefzil [cefprozil] Hives       Actual Body Weight:   66.1 kg    Renal Function:   Estimated Creatinine Clearance: 146.9 mL/min (based on SCr of 0.7 mg/dL).,     Dialysis Method (if applicable):  N/A     CBC (last 72 hours):  Recent Labs   Lab Result Units 07/15/19  1438 07/16/19  0421 07/17/19  0519   WBC K/uL 14.48* 12.48 14.57*   Hemoglobin g/dL 12.7* 13.6* 13.3*   Hemoglobin A1C %  --  5.5  --    Hematocrit % 39.0* 40.6 39.7*   Platelets K/uL 300 344 383*   Gran% % 73.2* 86.1* 85.7*   Lymph% % 18.2 11.1* 10.8*   Mono% % 5.9 2.8* 3.8*   Eosinophil% % 2.6 0.4 0.0   Basophil% % 0.1 0.1 0.0   Differential Method  Automated Automated Automated       Metabolic Panel (last 72 hours):  Recent Labs    Lab Result Units 07/15/19  1438 07/15/19  1639 07/15/19  1640 07/16/19  0421 07/17/19  0519   Sodium mmol/L 139  --   --  140 140   Potassium mmol/L 3.4*  --   --  4.2 4.0   Chloride mmol/L 103  --   --  103 106   CO2 mmol/L 25  --   --  25 23   Glucose mg/dL 129*  --   --  119* 139*   Glucose, UA   --  Negative  --   --   --    BUN, Bld mg/dL 11  --   --  10 14   Creatinine mg/dL 0.8  --   --  0.8 0.7   Creatinine, Random Ur mg/dL  --   --  101.9  --   --    Albumin g/dL 3.2*  --   --   --   --    Total Bilirubin mg/dL 0.3  --   --   --   --    Alkaline Phosphatase U/L 60  --   --   --   --    AST U/L 10  --   --   --   --    ALT U/L 9*  --   --   --   --    Magnesium mg/dL  --   --   --  2.0  --    Phosphorus mg/dL  --   --   --  3.2  --        Vancomycin Administrations:  vancomycin given in the last 96 hours                     vancomycin in dextrose 5 % 1 gram/250 mL IVPB 1,000 mg (mg) 1,000 mg New Bag 07/17/19 0630     1,000 mg New Bag 07/16/19 1628     1,000 mg New Bag  0358    vancomycin in dextrose 5 % 1 gram/250 mL IVPB 1,000 mg (mg) 1,000 mg New Bag 07/15/19 1812    vancomycin in dextrose 5 % 1 gram/250 mL IVPB 1,000 mg (mg) 1,000 mg New Bag 07/15/19 1632                      Drug levels (last 3 results):  Recent Labs   Lab Result Units 07/17/19  0519   Vancomycin-Trough ug/mL 3.9*       Microbiologic Results:  Microbiology Results (last 7 days)       Procedure Component Value Units Date/Time    Blood culture [[id number]] Collected:  07/15/19 1619    Order Status:  Completed Specimen:  Blood from Peripheral, Upper Arm, Left Updated:  07/17/19 0612     Blood Culture, Routine No Growth to date      No Growth to date    Blood culture [[id number]] Collected:  07/15/19 1600    Order Status:  Completed Specimen:  Blood from Peripheral, Upper Arm, Left Updated:  07/17/19 0612     Blood Culture, Routine No Growth to date      No Growth to date

## 2019-07-17 NOTE — CHAPLAIN
Follow up visit with patient.  Provided ministries of listening, presence, and prayer.  Pt had just came out of surgery and I took time to follow up.  Pt was doing really well and shared a lot with me.  I prayed with him before leaving and will follow up as needed.    Chaplain Rojelio Langley M.Div., [facility]

## 2019-07-17 NOTE — ASSESSMENT & PLAN NOTE
Vanc/meropenum  Neurosurgery managing.   Cervical collar inplace  Further evaluation/diagnostics/interventions/consults pending course     07/17-  continue Vancomycin/ aztreonam

## 2019-07-17 NOTE — PROGRESS NOTES
Spoke with Joe in MRI department to confirm patient's MRI. He stated he will be coming to get patient for MRI tonight.

## 2019-07-17 NOTE — TRANSFER OF CARE
"Anesthesia Transfer of Care Note    Patient: Alex Soares    Procedure(s) Performed: Procedure(s) (LRB):  DECOMPRESSION AND FUSION, SPINE, CERVICAL, ANTERIOR APPROACH (N/A)  CORPECTOMY, SPINE, CERVICAL (N/A)    Patient location: PACU    Anesthesia Type: general    Transport from OR: Transported from OR on room air with adequate spontaneous ventilation    Post pain: adequate analgesia    Post assessment: no apparent anesthetic complications    Post vital signs: stable    Level of consciousness: awake    Nausea/Vomiting: no nausea/vomiting    Complications: none    Transfer of care protocol was followed      Last vitals:   Visit Vitals  /72 (BP Location: Right arm, Patient Position: Lying)   Pulse 88   Temp 36.5 °C (97.7 °F) (Oral)   Resp 16   Ht 5' 8.11" (1.73 m)   Wt 66.1 kg (145 lb 11.6 oz)   SpO2 99%   BMI 22.09 kg/m²     "

## 2019-07-17 NOTE — BRIEF OP NOTE
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]    Post-op Diagnosis:  Post-Op Diagnosis Codes:     * Neck pain [M54.2]     * Cervical spinal cord compression [G95.20]     * Discitis of cervical region [M46.42]     * Spondylodiscitis [M46.40]     * IV drug user [F19.90]     * 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]    Procedure(s) (LRB):  DECOMPRESSION AND FUSION, SPINE, CERVICAL, ANTERIOR APPROACH (N/A)  CORPECTOMY, SPINE, CERVICAL C6/7    Anesthesia: General    Description of Procedure: C6 and C7 corpectomy     Description of the findings of the procedure: discitis and osteomyelitis C5-6/6-7/C7-T1  Prevertebral abscess C5-T1    Estimated Blood Loss: * No values recorded between 7/17/2019  8:13 AM and 7/17/2019 11:29 AM *         Specimens:   Specimen (12h ago, onward)    Start     Ordered    07/17/19 1031  Specimen to Pathology - Surgery  Once     Comments:  Pre-op Diagnosis: Neck pain [M54.2]Cervical spinal cord compression [G95.20]Discitis of cervical region [M46.42]Spondylodiscitis [M46.40]IV drug user [F19.90]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

## 2019-07-18 LAB
ANION GAP SERPL CALC-SCNC: 11 MMOL/L (ref 8–16)
BASOPHILS # BLD AUTO: 0.01 K/UL (ref 0–0.2)
BASOPHILS NFR BLD: 0.1 % (ref 0–1.9)
BUN SERPL-MCNC: 14 MG/DL (ref 6–20)
CALCIUM SERPL-MCNC: 9 MG/DL (ref 8.7–10.5)
CHLORIDE SERPL-SCNC: 105 MMOL/L (ref 95–110)
CO2 SERPL-SCNC: 23 MMOL/L (ref 23–29)
CREAT SERPL-MCNC: 0.7 MG/DL (ref 0.5–1.4)
DIFFERENTIAL METHOD: ABNORMAL
EOSINOPHIL # BLD AUTO: 0 K/UL (ref 0–0.5)
EOSINOPHIL NFR BLD: 0 % (ref 0–8)
ERYTHROCYTE [DISTWIDTH] IN BLOOD BY AUTOMATED COUNT: 12.9 % (ref 11.5–14.5)
EST. GFR  (AFRICAN AMERICAN): >60 ML/MIN/1.73 M^2
EST. GFR  (NON AFRICAN AMERICAN): >60 ML/MIN/1.73 M^2
GLUCOSE SERPL-MCNC: 131 MG/DL (ref 70–110)
HCT VFR BLD AUTO: 36.7 % (ref 40–54)
HGB BLD-MCNC: 12.1 G/DL (ref 14–18)
LYMPHOCYTES # BLD AUTO: 1.4 K/UL (ref 1–4.8)
LYMPHOCYTES NFR BLD: 6.8 % (ref 18–48)
MCH RBC QN AUTO: 28.4 PG (ref 27–31)
MCHC RBC AUTO-ENTMCNC: 33 G/DL (ref 32–36)
MCV RBC AUTO: 86 FL (ref 82–98)
MONOCYTES # BLD AUTO: 0.8 K/UL (ref 0.3–1)
MONOCYTES NFR BLD: 3.9 % (ref 4–15)
NEUTROPHILS # BLD AUTO: 17.6 K/UL (ref 1.8–7.7)
NEUTROPHILS NFR BLD: 89.5 % (ref 38–73)
PLATELET # BLD AUTO: 386 K/UL (ref 150–350)
PMV BLD AUTO: 9.3 FL (ref 9.2–12.9)
POTASSIUM SERPL-SCNC: 4 MMOL/L (ref 3.5–5.1)
RBC # BLD AUTO: 4.26 M/UL (ref 4.6–6.2)
SODIUM SERPL-SCNC: 139 MMOL/L (ref 136–145)
VANCOMYCIN TROUGH SERPL-MCNC: 14.3 UG/ML (ref 10–22)
WBC # BLD AUTO: 19.77 K/UL (ref 3.9–12.7)

## 2019-07-18 PROCEDURE — 80202 ASSAY OF VANCOMYCIN: CPT

## 2019-07-18 PROCEDURE — 36415 COLL VENOUS BLD VENIPUNCTURE: CPT

## 2019-07-18 PROCEDURE — 94799 UNLISTED PULMONARY SVC/PX: CPT

## 2019-07-18 PROCEDURE — 63600175 PHARM REV CODE 636 W HCPCS: Performed by: PHYSICIAN ASSISTANT

## 2019-07-18 PROCEDURE — S4991 NICOTINE PATCH NONLEGEND: HCPCS | Performed by: PHYSICIAN ASSISTANT

## 2019-07-18 PROCEDURE — 25000003 PHARM REV CODE 250: Performed by: PHYSICIAN ASSISTANT

## 2019-07-18 PROCEDURE — 80048 BASIC METABOLIC PNL TOTAL CA: CPT

## 2019-07-18 PROCEDURE — S0073 INJECTION, AZTREONAM, 500 MG: HCPCS | Performed by: PHYSICIAN ASSISTANT

## 2019-07-18 PROCEDURE — 97116 GAIT TRAINING THERAPY: CPT

## 2019-07-18 PROCEDURE — 97162 PT EVAL MOD COMPLEX 30 MIN: CPT

## 2019-07-18 PROCEDURE — 85025 COMPLETE CBC W/AUTO DIFF WBC: CPT

## 2019-07-18 PROCEDURE — 11000001 HC ACUTE MED/SURG PRIVATE ROOM

## 2019-07-18 RX ADMIN — DEXAMETHASONE SODIUM PHOSPHATE 4 MG: 4 INJECTION, SOLUTION INTRA-ARTICULAR; INTRALESIONAL; INTRAMUSCULAR; INTRAVENOUS; SOFT TISSUE at 05:07

## 2019-07-18 RX ADMIN — VANCOMYCIN HYDROCHLORIDE 1000 MG: 1 INJECTION, POWDER, FOR SOLUTION INTRAVENOUS at 01:07

## 2019-07-18 RX ADMIN — DOCUSATE SODIUM 100 MG: 100 CAPSULE, LIQUID FILLED ORAL at 08:07

## 2019-07-18 RX ADMIN — AZTREONAM 1000 MG: 1 INJECTION, POWDER, LYOPHILIZED, FOR SOLUTION INTRAMUSCULAR; INTRAVENOUS at 12:07

## 2019-07-18 RX ADMIN — OXYCODONE HYDROCHLORIDE AND ACETAMINOPHEN 1 TABLET: 7.5; 325 TABLET ORAL at 08:07

## 2019-07-18 RX ADMIN — DIPHENHYDRAMINE HYDROCHLORIDE 50 MG: 25 CAPSULE ORAL at 02:07

## 2019-07-18 RX ADMIN — THERA TABS 1 TABLET: TAB at 08:07

## 2019-07-18 RX ADMIN — VANCOMYCIN HYDROCHLORIDE 1000 MG: 1 INJECTION, POWDER, FOR SOLUTION INTRAVENOUS at 06:07

## 2019-07-18 RX ADMIN — DEXAMETHASONE SODIUM PHOSPHATE 4 MG: 4 INJECTION, SOLUTION INTRA-ARTICULAR; INTRALESIONAL; INTRAMUSCULAR; INTRAVENOUS; SOFT TISSUE at 11:07

## 2019-07-18 RX ADMIN — OXYCODONE HYDROCHLORIDE AND ACETAMINOPHEN 1 TABLET: 7.5; 325 TABLET ORAL at 12:07

## 2019-07-18 RX ADMIN — FAMOTIDINE 20 MG: 20 TABLET ORAL at 08:07

## 2019-07-18 RX ADMIN — VANCOMYCIN HYDROCHLORIDE 1000 MG: 1 INJECTION, POWDER, FOR SOLUTION INTRAVENOUS at 11:07

## 2019-07-18 RX ADMIN — AZTREONAM 1000 MG: 1 INJECTION, POWDER, LYOPHILIZED, FOR SOLUTION INTRAMUSCULAR; INTRAVENOUS at 08:07

## 2019-07-18 RX ADMIN — AZTREONAM 1000 MG: 1 INJECTION, POWDER, LYOPHILIZED, FOR SOLUTION INTRAMUSCULAR; INTRAVENOUS at 05:07

## 2019-07-18 RX ADMIN — Medication 1 PATCH: at 08:07

## 2019-07-18 RX ADMIN — OXYCODONE HYDROCHLORIDE AND ACETAMINOPHEN 1 TABLET: 7.5; 325 TABLET ORAL at 04:07

## 2019-07-18 RX ADMIN — DEXAMETHASONE SODIUM PHOSPHATE 4 MG: 4 INJECTION, SOLUTION INTRA-ARTICULAR; INTRALESIONAL; INTRAMUSCULAR; INTRAVENOUS; SOFT TISSUE at 06:07

## 2019-07-18 RX ADMIN — DEXAMETHASONE SODIUM PHOSPHATE 4 MG: 4 INJECTION, SOLUTION INTRA-ARTICULAR; INTRALESIONAL; INTRAMUSCULAR; INTRAVENOUS; SOFT TISSUE at 12:07

## 2019-07-18 RX ADMIN — RAMELTEON 8 MG: 8 TABLET, FILM COATED ORAL at 08:07

## 2019-07-18 NOTE — PROGRESS NOTES
Pharmacokinetic Assessment Follow Up: IV Vancomycin    Vancomycin serum concentration assessment(s):    Vancomycin trough: 14.3 mcg/ml   The trough level was drawn correctly and can be used to guide therapy at this time.    Vancomycin Regimen Plan:    Continue Vancomycin 1000 mg IV every 8 hours with next serum trough concentration measured at 0600 on 7/19    Pharmacy will continue to follow and monitor vancomycin.    Please contact pharmacy at extension [phone] for questions regarding this assessment.    Thank you for the consult,   Judie Mejias     Patient brief summary:  Alex Soares is a 28 y.o. male initiated on antimicrobial therapy with IV Vancomycin for treatment of suspected bone/joint    Drug Allergies:   Review of patient's allergies indicates:   Allergen Reactions    Ceclor [cefaclor] Hives    Cefixime Hives    Cefzil [cefprozil] Hives       Actual Body Weight:   66.1 kg    Renal Function:   Estimated Creatinine Clearance: 146.9 mL/min (based on SCr of 0.7 mg/dL).,     Dialysis Method (if applicable):  N/A     CBC (last 72 hours):  Recent Labs   Lab Result Units 07/15/19  1438 07/16/19  0421 07/17/19 0519 07/18/19  0424   WBC K/uL 14.48* 12.48 14.57* 19.77*   Hemoglobin g/dL 12.7* 13.6* 13.3* 12.1*   Hemoglobin A1C %  --  5.5  --   --    Hematocrit % 39.0* 40.6 39.7* 36.7*   Platelets K/uL 300 344 383* 386*   Gran% % 73.2* 86.1* 85.7* 89.5*   Lymph% % 18.2 11.1* 10.8* 6.8*   Mono% % 5.9 2.8* 3.8* 3.9*   Eosinophil% % 2.6 0.4 0.0 0.0   Basophil% % 0.1 0.1 0.0 0.1   Differential Method  Automated Automated Automated Automated       Metabolic Panel (last 72 hours):  Recent Labs   Lab Result Units 07/15/19  1438 07/15/19  1639 07/15/19  1640 07/16/19  0421 07/17/19  0519 07/18/19  0424   Sodium mmol/L 139  --   --  140 140 139   Potassium mmol/L 3.4*  --   --  4.2 4.0 4.0   Chloride mmol/L 103  --   --  103 106 105   CO2 mmol/L 25  --   --  25 23 23   Glucose mg/dL 129*  --   --  119* 139* 131*    Glucose, UA   --  Negative  --   --   --   --    BUN, Bld mg/dL 11  --   --  10 14 14   Creatinine mg/dL 0.8  --   --  0.8 0.7 0.7   Creatinine, Random Ur mg/dL  --   --  101.9  --   --   --    Albumin g/dL 3.2*  --   --   --   --   --    Total Bilirubin mg/dL 0.3  --   --   --   --   --    Alkaline Phosphatase U/L 60  --   --   --   --   --    AST U/L 10  --   --   --   --   --    ALT U/L 9*  --   --   --   --   --    Magnesium mg/dL  --   --   --  2.0  --   --    Phosphorus mg/dL  --   --   --  3.2  --   --        Vancomycin Administrations:  vancomycin given in the last 96 hours                     vancomycin in dextrose 5 % 1 gram/250 mL IVPB 1,000 mg (mg) 1,000 mg New Bag 07/18/19 0633     1,000 mg New Bag 07/17/19 2318     1,000 mg New Bag  1419    vancomycin injection (g) 1 g Given 07/17/19 0825    vancomycin in dextrose 5 % 1 gram/250 mL IVPB 1,000 mg (mg) 1,000 mg New Bag 07/17/19 0630     1,000 mg New Bag 07/16/19 1628     1,000 mg New Bag  0358                      Drug levels (last 3 results):  Recent Labs   Lab Result Units 07/17/19  0519 07/18/19  0424   Vancomycin-Trough ug/mL 3.9* 14.3       Microbiologic Results:  Microbiology Results (last 7 days)       Procedure Component Value Units Date/Time    Culture, Anaerobe [[id number]] Collected:  07/17/19 1044    Order Status:  Completed Specimen:  Bone from Vertebra Updated:  07/18/19 0910     Anaerobic Culture Culture in progress    Aerobic culture [[id number]] Collected:  07/17/19 1044    Order Status:  Completed Specimen:  Bone from Vertebra Updated:  07/18/19 0635     Aerobic Bacterial Culture No growth    Blood culture [[id number]] Collected:  07/15/19 1600    Order Status:  Completed Specimen:  Blood from Peripheral, Upper Arm, Left Updated:  07/18/19 0612     Blood Culture, Routine No Growth to date      No Growth to date      No Growth to date    Blood culture [[id number]] Collected:  07/15/19 1172    Order Status:  Completed Specimen:  Blood from  Peripheral, Upper Arm, Left Updated:  07/18/19 0612     Blood Culture, Routine No Growth to date      No Growth to date      No Growth to date    Gram stain [[id number]] Collected:  07/17/19 1044    Order Status:  Completed Specimen:  Bone from Vertebra Updated:  07/17/19 2222     Gram Stain Result Rare WBC's      No organisms seen    Fungus culture [[id number]] Collected:  07/17/19 1044    Order Status:  Sent Specimen:  Bone from Vertebra Updated:  07/17/19 2015    AFB Culture & Smear [[id number]] Collected:  07/17/19 1044    Order Status:  Sent Specimen:  Bone from Vertebra Updated:  07/17/19 2015    Gram stain [[id number]]     Order Status:  Canceled Specimen:  Tissue     Culture, Anaerobic [[id number]]     Order Status:  Canceled Specimen:  Tissue     AFB Culture & Smear [[id number]]     Order Status:  Canceled Specimen:  Tissue     Fungus culture [[id number]]     Order Status:  Canceled Specimen:  Tissue     Tissue culture [[id number]]     Order Status:  Canceled Specimen:  Tissue

## 2019-07-18 NOTE — ASSESSMENT & PLAN NOTE
Neurosurgery consulted by ED and is aware patient's case.  Patient does have some mild Neuro impairment with unsteady gait and generalized weakness all extremities on my exam and I have notified Neuro surgery at this time.  Awaiting their response/recommendations.  Patient was started on vancomycin in ED will increase coverage with meropenem.  Given his allergy and findings of osteo on MRI.  Consider addition of nafcillin to regimen pending course.    Will consult ID  Neuro checks  Further evaluation/diagnostics/interventions/consults pending course     07/16- will continue vancomycin / and use Aztreonam -will use cultures to guide therapy     7/18 - ID following,  s/p anterior cervical decompression and fusion, POD # 1, continue Vanco and Aztreonam, will use cultures to guide therapy.

## 2019-07-18 NOTE — PLAN OF CARE
Problem: Adult Inpatient Plan of Care  Goal: Plan of Care Review  Outcome: Ongoing (interventions implemented as appropriate)  Pt complains of generalized neck pain. Pain medication is effective. Incision is dry and intact. hemovac has no output at this time. Pt denies numbness/tingling. c-collar is in place. IV abx given per order. No injuries. Will continue to monitor. 12 hour chart check is completed.

## 2019-07-18 NOTE — PT/OT/SLP EVAL
Physical Therapy Evaluation    Patient Name:  Alex Soares   MRN:  [id number]    Recommendations:     Discharge Recommendations:  outpatient PT   Discharge Equipment Recommendations: none   Barriers to discharge: None    Assessment:     Alex Soares is a 28 y.o. male admitted with a medical diagnosis of Cervical spinal cord compression.  He presents with the following impairments/functional limitations:  weakness, gait instability, impaired endurance, impaired functional mobilty, impaired self care skills, decreased lower extremity function, decreased upper extremity function, decreased ROM .    Rehab Prognosis: Good; patient would benefit from acute skilled PT services to address these deficits and reach maximum level of function.    Recent Surgery: Procedure(s) (LRB):  DECOMPRESSION AND FUSION, SPINE, CERVICAL, ANTERIOR APPROACH (N/A)  CORPECTOMY, SPINE, CERVICAL (N/A)  INCISION AND DRAINAGE, ABSCESS (N/A)  APPLICATION, ACELLULAR HUMAN DERMAL ALLOGRAFT (N/A) 1 Day Post-Op    Plan:     During this hospitalization, patient to be seen   to address the identified rehab impairments via gait training, therapeutic activities, therapeutic exercises and progress toward the following goals:    · Plan of Care Expires:  07/25/19    Subjective     Chief Complaint: WEAKNESS  Patient/Family Comments/goals: INC STRENGTH   Pain/Comfort:  · Pain Rating 1: 0/10  · Pain Rating Post-Intervention 1: 0/10    Patients cultural, spiritual, Restorationist conflicts given the current situation:      Living Environment:  PT LIVES AT HOME WITH PARENTS AND HAS 5 STEPS WITH RAILING TO ENTER HOME   Prior to admission, patients level of function was IND .  Equipment used at home:  .  DME owned (not currently used): none.  Upon discharge, patient will have assistance from MOTHER.    Objective:     Communicated with NURSE AND Epic CHART REVIEW prior to session.  Patient found supine with telemetry, peripheral IV  upon PT entry to  room.    General Precautions: Standard, fall   Orthopedic Precautions:N/A   Braces: N/A     Exams:  · RLE ROM: WFL  · RLE Strength: GROSSLY 4+/5 AND HIP FLEX 4/5   · LLE ROM: WFL  · LLE Strength: GROSSLY 4+/5 AND HIP FLEX 4/5    Functional Mobility:  PT MET IN RM SUP>SIT EOB WITH S. PT STOOD WITH NO AD AND GT TRAINED X 300' WITH SLOW PACE AND UNSTEADY GT. PT RETURNED TO RM T/F TO CHAIR AND ROM AND STRENGTH ASSESSED. PT REPORTED B UE TINGLING IN B HANDS. PT LEFT SEATED IN CHAIR WITH ALL NEEDS MET AND MOTHER PRESENT.     AM-PAC 6 CLICK MOBILITY  Total Score:20     Patient left up in chair with call button in reach and MOTHER present.    GOALS:   Multidisciplinary Problems     Physical Therapy Goals        Problem: Physical Therapy Goal    Goal Priority Disciplines Outcome Goal Variances Interventions   Physical Therapy Goal     PT, PT/OT      Description:  PT WILL BE SEEN FOR P.T. FOR A MIN OF 5 OUT OF 7 DAYS A WEEK  LT19  1. PT WILL COMPLETE BED MOBILITY IND  2. PT WILL T/F TO CHAIR IND  3. PT WILL GT TRAIN X 500' IND   4. PT WILL COMPLETE B LE TE X 20 REPS                      History:     Past Medical History:   Diagnosis Date    Asthma        Past Surgical History:   Procedure Laterality Date    APPLICATION, ACELLULAR HUMAN DERMAL ALLOGRAFT N/A 2019    Performed by [facility] at [facility] OR    CORPECTOMY, SPINE, CERVICAL N/A 2019    Performed by [facility] at [facility] OR    DECOMPRESSION AND FUSION, SPINE, CERVICAL, ANTERIOR APPROACH N/A 2019    Performed by [facility] at [facility] OR    INCISION AND DRAINAGE, ABSCESS N/A 2019    Performed by [facility] at [facility] OR    NO PAST SURGERIES         Time Tracking:     PT Received On: 19  PT Start Time: 1010     PT Stop Time: 1036  PT Total Time (min): 26 min     Billable Minutes: Evaluation 16 and Gait Training 10      Abby Reich, PT  2019

## 2019-07-18 NOTE — SUBJECTIVE & OBJECTIVE
Interval History: Pt seen and examined. S/P anterior cervical decompression and fusion, POD # 1      Review of Systems   Constitutional: Negative for chills, diaphoresis, fatigue and fever.   HENT: Negative for congestion, ear discharge, rhinorrhea, sinus pressure, sore throat and trouble swallowing.    Eyes: Negative for discharge and visual disturbance.   Respiratory: Negative for apnea, cough, choking, chest tightness, shortness of breath, wheezing and stridor.    Cardiovascular: Negative for chest pain, palpitations and leg swelling.   Gastrointestinal: Negative for abdominal distention, abdominal pain, diarrhea, nausea and vomiting.   Endocrine: Negative for cold intolerance and heat intolerance.   Genitourinary: Negative for dysuria, frequency and hematuria.   Musculoskeletal: Negative for arthralgias, back pain, myalgias and neck pain.   Skin: Negative for pallor and rash.   Neurological: Negative for dizziness, seizures, syncope, weakness and headaches.   Psychiatric/Behavioral: Negative for agitation, confusion and sleep disturbance.     Objective:     Vital Signs (Most Recent):  Temp: 98.2 °F (36.8 °C) (07/18/19 1552)  Pulse: 78 (07/18/19 1552)  Resp: 18 (07/18/19 1552)  BP: 139/75 (07/18/19 1552)  SpO2: 97 % (07/18/19 1552) Vital Signs (24h Range):  Temp:  [97.3 °F (36.3 °C)-98.6 °F (37 °C)] 98.2 °F (36.8 °C)  Pulse:  [69-90] 78  Resp:  [15-18] 18  SpO2:  [96 %-98 %] 97 %  BP: (114-139)/(65-76) 139/75     Weight: 66.1 kg (145 lb 11.6 oz)  Body mass index is 22.09 kg/m².    Intake/Output Summary (Last 24 hours) at 7/18/2019 1846  Last data filed at 7/18/2019 1352  Gross per 24 hour   Intake 1850 ml   Output 1250 ml   Net 600 ml      Physical Exam   Constitutional: He is oriented to person, place, and time. No distress. Cervical collar in place.   HENT:   Mouth/Throat: No oropharyngeal exudate.   Eyes: Right eye exhibits no discharge. Left eye exhibits no discharge.   Neck: No JVD present.   Cardiovascular:  Exam reveals no gallop and no friction rub.   No murmur heard.  Pulmonary/Chest: No stridor. No respiratory distress. He has no wheezes. He has no rales. He exhibits no tenderness.   Abdominal: He exhibits no distension and no mass. There is no tenderness. There is no rebound and no guarding. No hernia.   Musculoskeletal: He exhibits no edema or deformity.   Neurological: He is alert and oriented to person, place, and time.   Skin: Skin is warm and dry. Capillary refill takes less than 2 seconds. He is not diaphoretic.   Nursing note and vitals reviewed.      Significant Labs:   CBC:   Recent Labs   Lab 07/17/19 0519 07/18/19  0424   WBC 14.57* 19.77*   HGB 13.3* 12.1*   HCT 39.7* 36.7*   * 386*     CMP:   Recent Labs   Lab 07/17/19 0519 07/18/19 0424    139   K 4.0 4.0    105   CO2 23 23   * 131*   BUN 14 14   CREATININE 0.7 0.7   CALCIUM 9.6 9.0   ANIONGAP 11 11   EGFRNONAA >60 >60       Significant Imaging:     Imaging Results          MRI Cervical Spine Without Contrast (Final result)  Result time 07/15/19 15:59:07    Final result by Paco Merritt Jr., MD (07/15/19 15:59:07)                 Impression:      1. 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  No definite cord edema at this time.  Postcontrast imaging may be obtained as needed.  2. Vertebral body osteomyelitis of C5, C6, C7, and T1.  3. Anterior paraspinal phlegmon/abscess extending from mid C2 to mid T1.  4. Foraminal stenosis at C4-C5, C5-C6, and C6-C7.      Electronically signed by: Paco Merritt Jr., MD  Date:    07/15/2019  Time:    15:59             Narrative:    EXAMINATION:  MRI CERVICAL SPINE WITHOUT CONTRAST    CLINICAL HISTORY:  Spine fracture, traumatic, cervical;    TECHNIQUE:  MR Cervical spine without contrast. Sagittal T1, T2, STIR. Axial 3D, T2. Coronal T1.    COMPARISON:  None available.    FINDINGS:  There is erosive  endplate change, complete disc space height loss, and wedge deformity of the C6 and C7 vertebral bodies with abnormal marrow signal most consistent with spondylodiscitis.  There is also abnormal marrow signal within the bodies of C5 and T1.  There is disc space abscess/phlegmon/retropulsion relating to wedge compression deformity at C6-C7 that posteriorly protrudes and flattens the thecal sac to 5 mm AP.  The ligamentum flavum/interspinous ligaments at C5-C6 are stretched taut posteriorly but are intact.  There is extensive anterior paraspinal swelling/edema possibly with central loculated abscess collections.  This extends for a craniocaudal distance of 12 cm from the level of mid C2 to mid T1.  Intervertebral disc levels are as follows:    C2-C3 disc: No significant disc pathology.  Normal facet joints.  No spinal canal or neural foraminal stenosis.    C3-C4 disc: No significant disc pathology.  Normal facet joints.  No spinal canal or neural foraminal stenosis.    C4-C5 disc: Mild disc space height loss.  Mild uncovertebral joint degeneration bilaterally.  The thecal sac measures 11 mm AP.  Mild bilateral foraminal stenosis.    C5-C6 disc: Disc space height loss with moderate/severe uncovertebral joint degeneration and hypertrophy bilaterally.  There is severe bilateral foraminal stenosis.  The thecal sac measures 9 mm AP.    C6-C7 disc: Level of spondylodiscitis with severe bilateral foraminal stenosis and severe thecal sac stenosis flattened to 5 mm.  Slightly increased T2 signal within the cord.    C7-T1 disc: Normal disc space height and normal facet joints.  Mild bilateral foraminal stenosis relating to phlegmonous material.  The thecal sac measures 12 mm.                               CT Cervical Spine Without Contrast (Edited Result - FINAL)  Result time 07/17/19 12:41:39    Addendum 1 of 1 by Murray Bowman MD (07/17/19 12:41:39)      Compression deformities are seen at C6 and C7.  Wedge compression  fracture deformities are noted at C6 and C7.  Retropulsion at C6/7 producing narrowing of the central canal to 6-7 mm and moderate to severe left and mild-to-moderate right neural foraminal narrowing.      Electronically signed by: Murray Bowman MD  Date:    07/17/2019  Time:    12:41               Final result by Murray Bowman MD (07/15/19 15:16:27)                 Impression:      Compression deformities are seen at C6 and C4.  Wedge compression fracture deformities are noted at C6 and C4.  Retropulsion at C6/7 producing narrowing of the central canal to 6-7 mm and moderate to severe left and mild-to-moderate right neural foraminal narrowing.    COMMUNICATION  This critical result was discovered/received at 15:05.  The critical information above was relayed directly by me by telephone to Dr. Foote on 07/15/2019 at 15:07.    All CT scans at this facility use dose modulation, iterative reconstruction, and/or weight base dosing when appropriate to reduce radiation dose to as low as reasonably achievable.      Electronically signed by: Murray Bowman MD  Date:    07/15/2019  Time:    15:16             Narrative:    EXAMINATION:  CT CERVICAL SPINE WITHOUT CONTRAST    CLINICAL HISTORY:  Spine fracture, traumatic, cervical;    TECHNIQUE:  1.25 mm axial noncontrast CT images were obtained through the cervical spine using soft tissue and bony algorithm so.  Sagittal coronal reformats were also submitted for interpretation.    COMPARISON:  07/15/2019    FINDINGS:  Compression deformities are seen at C6 and C4.  Wedge compression fracture deformities are noted at C6 and C4 which appear significantly comminuted..  Retropulsion at C6/7 producing narrowing of the central canal to 6-7 mm. Reversal of the normal cervical lordosis with epicenter at C6/7.  Moderate to severe left and mild-to-moderate right neural foraminal narrowing.  Posterior elements appear intact.  The vertebral body heights are otherwise normal.   Intervertebral disc space height is maintained at the remaining levels.  Moderate prevertebral soft tissue swelling within the lower cervical region measuring up to 2.7 cm.    No significant degenerative changes.  Please note that routine CT of the spine is limited in its evaluation of extradural/epidural disease.  Lung apices are clear.

## 2019-07-18 NOTE — PROGRESS NOTES
[facility]  Neurosurgery  Progress Note    Subjective:     History of Present Illness: No notes on file    Post-Op Info:  Procedure(s) (LRB):  DECOMPRESSION AND FUSION, SPINE, CERVICAL, ANTERIOR APPROACH (N/A)  CORPECTOMY, SPINE, CERVICAL (N/A)   1 Day Post-Op   Patient doing well this morning  Neck pain and pain into the upper extremities has improved  No complaints  Tolerating a diet  States numbness and tingling is much better    Move all extremities well  Incision clean dry and intact  Hemovac with minimal output removed without difficulty  Trach midline      Assessment/Plan:     Doing well postoperative day 1  Keep C-collar on at all times  Waiting cultures from OR  Will continue to follow    Sánchez Jeong MD  Neurosurgery  [facility]

## 2019-07-18 NOTE — PROGRESS NOTES
[facility] Hospital Medicine  Progress Note    Patient Name: Alex Soares  MRN: [id number]  Patient Class: IP- Inpatient   Admission Date: 7/15/2019  Length of Stay: 3 days  Attending Physician: Elayne Akhtar MD  Primary Care Provider: Simi Macdonald MD        Subjective:     Principal Problem:Cervical spinal cord compression      HPI:  28-year-old male with history of IV drug use, last use of heroin about 2 weeks ago, and tattoos, last recent tattoo 4 weeks ago; presents today as a transfer from outside freestanding ER for neck pain. Patient reports he has been having neck pain over the past month and has been seen outpatient but has not improved.  Patient reports playing with the dog earlier which exacerbated his pain and why he presented to the emergency room.  Patient was evaluated in the ER MRI performed and shows a cervical C6-7 with surrounding vertebral body osteomyelitis. Neurosurgery was consulted by ER.  Vancomycin was started.  Hospital Medicine consulted patient admitted.  Patient seen and examined on arrival.  Cervical collar in place on arrival.  Patient with allergies to cephalosporin reports hives in past.  Patient with increased risk of MRSA/MSSA will cover with both vancomycin and meropenem (given patient allergy).    Overview/Hospital Course:  28-year-old male with history of IV drug use, last use of heroin - a day prior to presentation .  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  No definite cord edema at this time.  Postcontrast imaging may be obtained as needed.  2. Vertebral body osteomyelitis of C5, C6, C7, and T1.  3. Anterior paraspinal phlegmon/abscess extending from mid C2 to mid T1  07/17 -s/p Cervical spine decompression.   As of 7/18/2019, patient seen and examined, s/p anterior cervical decompression and fusion, POD # 1 by Dr Jeong.  Patient  reports feeling well, moving all extremities, cervical collar intact. Vitals signs and labs stable. ID following, awaiting culture results from surgery.         Interval History: Pt seen and examined. S/P anterior cervical decompression and fusion, POD # 1      Review of Systems   Constitutional: Negative for chills, diaphoresis, fatigue and fever.   HENT: Negative for congestion, ear discharge, rhinorrhea, sinus pressure, sore throat and trouble swallowing.    Eyes: Negative for discharge and visual disturbance.   Respiratory: Negative for apnea, cough, choking, chest tightness, shortness of breath, wheezing and stridor.    Cardiovascular: Negative for chest pain, palpitations and leg swelling.   Gastrointestinal: Negative for abdominal distention, abdominal pain, diarrhea, nausea and vomiting.   Endocrine: Negative for cold intolerance and heat intolerance.   Genitourinary: Negative for dysuria, frequency and hematuria.   Musculoskeletal: Negative for arthralgias, back pain, myalgias and neck pain.   Skin: Negative for pallor and rash.   Neurological: Negative for dizziness, seizures, syncope, weakness and headaches.   Psychiatric/Behavioral: Negative for agitation, confusion and sleep disturbance.     Objective:     Vital Signs (Most Recent):  Temp: 98.2 °F (36.8 °C) (07/18/19 1552)  Pulse: 78 (07/18/19 1552)  Resp: 18 (07/18/19 1552)  BP: 139/75 (07/18/19 1552)  SpO2: 97 % (07/18/19 1552) Vital Signs (24h Range):  Temp:  [97.3 °F (36.3 °C)-98.6 °F (37 °C)] 98.2 °F (36.8 °C)  Pulse:  [69-90] 78  Resp:  [15-18] 18  SpO2:  [96 %-98 %] 97 %  BP: (114-139)/(65-76) 139/75     Weight: 66.1 kg (145 lb 11.6 oz)  Body mass index is 22.09 kg/m².    Intake/Output Summary (Last 24 hours) at 7/18/2019 1846  Last data filed at 7/18/2019 1352  Gross per 24 hour   Intake 1850 ml   Output 1250 ml   Net 600 ml      Physical Exam   Constitutional: He is oriented to person, place, and time. No distress. Cervical collar in place.    HENT:   Mouth/Throat: No oropharyngeal exudate.   Eyes: Right eye exhibits no discharge. Left eye exhibits no discharge.   Neck: No JVD present.   Cardiovascular: Exam reveals no gallop and no friction rub.   No murmur heard.  Pulmonary/Chest: No stridor. No respiratory distress. He has no wheezes. He has no rales. He exhibits no tenderness.   Abdominal: He exhibits no distension and no mass. There is no tenderness. There is no rebound and no guarding. No hernia.   Musculoskeletal: He exhibits no edema or deformity.   Neurological: He is alert and oriented to person, place, and time.   Skin: Skin is warm and dry. Capillary refill takes less than 2 seconds. He is not diaphoretic.   Nursing note and vitals reviewed.      Significant Labs:   CBC:   Recent Labs   Lab 07/17/19 0519 07/18/19 0424   WBC 14.57* 19.77*   HGB 13.3* 12.1*   HCT 39.7* 36.7*   * 386*     CMP:   Recent Labs   Lab 07/17/19 0519 07/18/19 0424    139   K 4.0 4.0    105   CO2 23 23   * 131*   BUN 14 14   CREATININE 0.7 0.7   CALCIUM 9.6 9.0   ANIONGAP 11 11   EGFRNONAA >60 >60       Significant Imaging:     Imaging Results          MRI Cervical Spine Without Contrast (Final result)  Result time 07/15/19 15:59:07    Final result by Paco Merritt Jr., MD (07/15/19 15:59:07)                 Impression:      1. 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  No definite cord edema at this time.  Postcontrast imaging may be obtained as needed.  2. Vertebral body osteomyelitis of C5, C6, C7, and T1.  3. Anterior paraspinal phlegmon/abscess extending from mid C2 to mid T1.  4. Foraminal stenosis at C4-C5, C5-C6, and C6-C7.      Electronically signed by: Paco Merritt Jr., MD  Date:    07/15/2019  Time:    15:59             Narrative:    EXAMINATION:  MRI CERVICAL SPINE WITHOUT CONTRAST    CLINICAL HISTORY:  Spine fracture, traumatic,  cervical;    TECHNIQUE:  MR Cervical spine without contrast. Sagittal T1, T2, STIR. Axial 3D, T2. Coronal T1.    COMPARISON:  None available.    FINDINGS:  There is erosive endplate change, complete disc space height loss, and wedge deformity of the C6 and C7 vertebral bodies with abnormal marrow signal most consistent with spondylodiscitis.  There is also abnormal marrow signal within the bodies of C5 and T1.  There is disc space abscess/phlegmon/retropulsion relating to wedge compression deformity at C6-C7 that posteriorly protrudes and flattens the thecal sac to 5 mm AP.  The ligamentum flavum/interspinous ligaments at C5-C6 are stretched taut posteriorly but are intact.  There is extensive anterior paraspinal swelling/edema possibly with central loculated abscess collections.  This extends for a craniocaudal distance of 12 cm from the level of mid C2 to mid T1.  Intervertebral disc levels are as follows:    C2-C3 disc: No significant disc pathology.  Normal facet joints.  No spinal canal or neural foraminal stenosis.    C3-C4 disc: No significant disc pathology.  Normal facet joints.  No spinal canal or neural foraminal stenosis.    C4-C5 disc: Mild disc space height loss.  Mild uncovertebral joint degeneration bilaterally.  The thecal sac measures 11 mm AP.  Mild bilateral foraminal stenosis.    C5-C6 disc: Disc space height loss with moderate/severe uncovertebral joint degeneration and hypertrophy bilaterally.  There is severe bilateral foraminal stenosis.  The thecal sac measures 9 mm AP.    C6-C7 disc: Level of spondylodiscitis with severe bilateral foraminal stenosis and severe thecal sac stenosis flattened to 5 mm.  Slightly increased T2 signal within the cord.    C7-T1 disc: Normal disc space height and normal facet joints.  Mild bilateral foraminal stenosis relating to phlegmonous material.  The thecal sac measures 12 mm.                               CT Cervical Spine Without Contrast (Edited Result -  FINAL)  Result time 07/17/19 12:41:39    Addendum 1 of 1 by Murray Bowman MD (07/17/19 12:41:39)      Compression deformities are seen at C6 and C7.  Wedge compression fracture deformities are noted at C6 and C7.  Retropulsion at C6/7 producing narrowing of the central canal to 6-7 mm and moderate to severe left and mild-to-moderate right neural foraminal narrowing.      Electronically signed by: Murray Bowman MD  Date:    07/17/2019  Time:    12:41               Final result by Murray Bowman MD (07/15/19 15:16:27)                 Impression:      Compression deformities are seen at C6 and C4.  Wedge compression fracture deformities are noted at C6 and C4.  Retropulsion at C6/7 producing narrowing of the central canal to 6-7 mm and moderate to severe left and mild-to-moderate right neural foraminal narrowing.    COMMUNICATION  This critical result was discovered/received at 15:05.  The critical information above was relayed directly by me by telephone to Dr. Foote on 07/15/2019 at 15:07.    All CT scans at this facility use dose modulation, iterative reconstruction, and/or weight base dosing when appropriate to reduce radiation dose to as low as reasonably achievable.      Electronically signed by: Murray Bowman MD  Date:    07/15/2019  Time:    15:16             Narrative:    EXAMINATION:  CT CERVICAL SPINE WITHOUT CONTRAST    CLINICAL HISTORY:  Spine fracture, traumatic, cervical;    TECHNIQUE:  1.25 mm axial noncontrast CT images were obtained through the cervical spine using soft tissue and bony algorithm so.  Sagittal coronal reformats were also submitted for interpretation.    COMPARISON:  07/15/2019    FINDINGS:  Compression deformities are seen at C6 and C4.  Wedge compression fracture deformities are noted at C6 and C4 which appear significantly comminuted..  Retropulsion at C6/7 producing narrowing of the central canal to 6-7 mm. Reversal of the normal cervical lordosis with epicenter at C6/7.   Moderate to severe left and mild-to-moderate right neural foraminal narrowing.  Posterior elements appear intact.  The vertebral body heights are otherwise normal.  Intervertebral disc space height is maintained at the remaining levels.  Moderate prevertebral soft tissue swelling within the lower cervical region measuring up to 2.7 cm.    No significant degenerative changes.  Please note that routine CT of the spine is limited in its evaluation of extradural/epidural disease.  Lung apices are clear.                                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  Patient does have some mild Neuro impairment with unsteady gait and generalized weakness all extremities on my exam and I have notified Neuro surgery at this time.  Awaiting their response/recommendations.  Patient was started on vancomycin in ED will increase coverage with meropenem.  Given his allergy and findings of osteo on MRI.  Consider addition of nafcillin to regimen pending course.    Will consult ID  Neuro checks  Further evaluation/diagnostics/interventions/consults pending course     07/16- will continue vancomycin / and use Aztreonam -will use cultures to guide therapy     7/18 - ID following,  s/p anterior cervical decompression and fusion, POD # 1, continue Vanco and Aztreonam, will use cultures to guide therapy.       Sepsis  Related to above.   Continue vanc and meropenem  Id consult  Neurosurgery consult  Further evaluation/diagnostics/interventions/consults pending course         Elevated BP without diagnosis of hypertension  Monitor consider daily therapy pending course  Further evaluation/diagnostics/interventions/consults pending course          Hypokalemia  borderline  Monitor and supplement as need      IV drug user  Vancomycin and meropenem.  Consider addition of nafcillin pending course.  Id consult  Check echo  The patient was counseled on the dangers of use and encouraged to quit.  Further evaluation/diagnostics/interventions/consults pending course     07/16- needs out patient drug cessation counseling     Spondylodiscitis  Vanc/meropenum  Neurosurgery managing.   Cervical collar inplace  Further evaluation/diagnostics/interventions/consults pending course     07/17-  continue Vancomycin/ aztreonam           VTE Risk Mitigation (From admission, onward)        Ordered     IP VTE HIGH RISK PATIENT  Once      07/17/19 1247     Place sequential compression device  Until discontinued      07/17/19 1247     Place sequential compression device  Until discontinued      07/15/19 1033                Kevon Leger NP  Department of Hospital Medicine   [facility]

## 2019-07-18 NOTE — PLAN OF CARE
Problem: Adult Inpatient Plan of Care  Goal: Plan of Care Review  Outcome: Ongoing (interventions implemented as appropriate)  POC reviewed with patient. Pt verbalized understanding  Pt remains free of injuries and falls; fall precaution in place.   Intermittent complaints of pain on this shift.  IV remained intact.  IV abx administered per MAR.  C-collar in place at all times  Neuro checks x4  Bed low, side rails x2, call light in reach, personal belongings at bedside.  Reminded to call for assistance.  Chart check complete. Will continue to monitor.

## 2019-07-19 LAB
ANION GAP SERPL CALC-SCNC: 10 MMOL/L (ref 8–16)
BASOPHILS # BLD AUTO: 0 K/UL (ref 0–0.2)
BASOPHILS NFR BLD: 0 % (ref 0–1.9)
BUN SERPL-MCNC: 14 MG/DL (ref 6–20)
CALCIUM SERPL-MCNC: 9.1 MG/DL (ref 8.7–10.5)
CHLORIDE SERPL-SCNC: 104 MMOL/L (ref 95–110)
CO2 SERPL-SCNC: 26 MMOL/L (ref 23–29)
CREAT SERPL-MCNC: 0.7 MG/DL (ref 0.5–1.4)
DIFFERENTIAL METHOD: ABNORMAL
EOSINOPHIL # BLD AUTO: 0 K/UL (ref 0–0.5)
EOSINOPHIL NFR BLD: 0 % (ref 0–8)
ERYTHROCYTE [DISTWIDTH] IN BLOOD BY AUTOMATED COUNT: 12.6 % (ref 11.5–14.5)
EST. GFR  (AFRICAN AMERICAN): >60 ML/MIN/1.73 M^2
EST. GFR  (NON AFRICAN AMERICAN): >60 ML/MIN/1.73 M^2
GLUCOSE SERPL-MCNC: 128 MG/DL (ref 70–110)
HCT VFR BLD AUTO: 35.3 % (ref 40–54)
HGB BLD-MCNC: 11.7 G/DL (ref 14–18)
LYMPHOCYTES # BLD AUTO: 1.4 K/UL (ref 1–4.8)
LYMPHOCYTES NFR BLD: 10.2 % (ref 18–48)
MCH RBC QN AUTO: 28.3 PG (ref 27–31)
MCHC RBC AUTO-ENTMCNC: 33.1 G/DL (ref 32–36)
MCV RBC AUTO: 85 FL (ref 82–98)
MONOCYTES # BLD AUTO: 0.7 K/UL (ref 0.3–1)
MONOCYTES NFR BLD: 4.9 % (ref 4–15)
NEUTROPHILS # BLD AUTO: 11.7 K/UL (ref 1.8–7.7)
NEUTROPHILS NFR BLD: 85.2 % (ref 38–73)
PLATELET # BLD AUTO: 329 K/UL (ref 150–350)
PMV BLD AUTO: 9.1 FL (ref 9.2–12.9)
POTASSIUM SERPL-SCNC: 3.9 MMOL/L (ref 3.5–5.1)
RBC # BLD AUTO: 4.14 M/UL (ref 4.6–6.2)
SODIUM SERPL-SCNC: 140 MMOL/L (ref 136–145)
VANCOMYCIN TROUGH SERPL-MCNC: 5 UG/ML (ref 10–22)
WBC # BLD AUTO: 13.81 K/UL (ref 3.9–12.7)

## 2019-07-19 PROCEDURE — 80048 BASIC METABOLIC PNL TOTAL CA: CPT

## 2019-07-19 PROCEDURE — 80202 ASSAY OF VANCOMYCIN: CPT

## 2019-07-19 PROCEDURE — 85025 COMPLETE CBC W/AUTO DIFF WBC: CPT

## 2019-07-19 PROCEDURE — 25000003 PHARM REV CODE 250: Performed by: INTERNAL MEDICINE

## 2019-07-19 PROCEDURE — 97116 GAIT TRAINING THERAPY: CPT

## 2019-07-19 PROCEDURE — 94799 UNLISTED PULMONARY SVC/PX: CPT

## 2019-07-19 PROCEDURE — S0073 INJECTION, AZTREONAM, 500 MG: HCPCS | Performed by: PHYSICIAN ASSISTANT

## 2019-07-19 PROCEDURE — 11000001 HC ACUTE MED/SURG PRIVATE ROOM

## 2019-07-19 PROCEDURE — 36415 COLL VENOUS BLD VENIPUNCTURE: CPT

## 2019-07-19 PROCEDURE — 25000003 PHARM REV CODE 250: Performed by: PHYSICIAN ASSISTANT

## 2019-07-19 PROCEDURE — 63600175 PHARM REV CODE 636 W HCPCS: Performed by: PHYSICIAN ASSISTANT

## 2019-07-19 PROCEDURE — 63600175 PHARM REV CODE 636 W HCPCS: Performed by: INTERNAL MEDICINE

## 2019-07-19 PROCEDURE — 99900035 HC TECH TIME PER 15 MIN (STAT)

## 2019-07-19 PROCEDURE — 97110 THERAPEUTIC EXERCISES: CPT

## 2019-07-19 PROCEDURE — S4991 NICOTINE PATCH NONLEGEND: HCPCS | Performed by: PHYSICIAN ASSISTANT

## 2019-07-19 RX ADMIN — VANCOMYCIN HYDROCHLORIDE 1000 MG: 1 INJECTION, POWDER, FOR SOLUTION INTRAVENOUS at 08:07

## 2019-07-19 RX ADMIN — DEXAMETHASONE SODIUM PHOSPHATE 4 MG: 4 INJECTION, SOLUTION INTRA-ARTICULAR; INTRALESIONAL; INTRAMUSCULAR; INTRAVENOUS; SOFT TISSUE at 05:07

## 2019-07-19 RX ADMIN — Medication 1 PATCH: at 08:07

## 2019-07-19 RX ADMIN — RAMELTEON 8 MG: 8 TABLET, FILM COATED ORAL at 08:07

## 2019-07-19 RX ADMIN — DOCUSATE SODIUM 100 MG: 100 CAPSULE, LIQUID FILLED ORAL at 08:07

## 2019-07-19 RX ADMIN — OXYCODONE HYDROCHLORIDE AND ACETAMINOPHEN 1 TABLET: 7.5; 325 TABLET ORAL at 10:07

## 2019-07-19 RX ADMIN — FAMOTIDINE 20 MG: 20 TABLET ORAL at 08:07

## 2019-07-19 RX ADMIN — OXYCODONE HYDROCHLORIDE AND ACETAMINOPHEN 1 TABLET: 7.5; 325 TABLET ORAL at 08:07

## 2019-07-19 RX ADMIN — OXYCODONE HYDROCHLORIDE AND ACETAMINOPHEN 1 TABLET: 7.5; 325 TABLET ORAL at 01:07

## 2019-07-19 RX ADMIN — DEXAMETHASONE SODIUM PHOSPHATE 4 MG: 4 INJECTION, SOLUTION INTRA-ARTICULAR; INTRALESIONAL; INTRAMUSCULAR; INTRAVENOUS; SOFT TISSUE at 12:07

## 2019-07-19 RX ADMIN — AZTREONAM 1000 MG: 1 INJECTION, POWDER, LYOPHILIZED, FOR SOLUTION INTRAMUSCULAR; INTRAVENOUS at 04:07

## 2019-07-19 RX ADMIN — DEXAMETHASONE SODIUM PHOSPHATE 4 MG: 4 INJECTION, SOLUTION INTRA-ARTICULAR; INTRALESIONAL; INTRAMUSCULAR; INTRAVENOUS; SOFT TISSUE at 11:07

## 2019-07-19 RX ADMIN — THERA TABS 1 TABLET: TAB at 08:07

## 2019-07-19 RX ADMIN — OXYCODONE HYDROCHLORIDE AND ACETAMINOPHEN 1 TABLET: 7.5; 325 TABLET ORAL at 06:07

## 2019-07-19 RX ADMIN — VANCOMYCIN HYDROCHLORIDE 500 MG: 500 INJECTION, POWDER, LYOPHILIZED, FOR SOLUTION INTRAVENOUS at 12:07

## 2019-07-19 RX ADMIN — OXYCODONE HYDROCHLORIDE AND ACETAMINOPHEN 1 TABLET: 7.5; 325 TABLET ORAL at 04:07

## 2019-07-19 RX ADMIN — AZTREONAM 1000 MG: 1 INJECTION, POWDER, LYOPHILIZED, FOR SOLUTION INTRAMUSCULAR; INTRAVENOUS at 01:07

## 2019-07-19 RX ADMIN — VANCOMYCIN HYDROCHLORIDE 1250 MG: 100 INJECTION, POWDER, LYOPHILIZED, FOR SOLUTION INTRAVENOUS at 07:07

## 2019-07-19 RX ADMIN — ACETAMINOPHEN 650 MG: 325 TABLET ORAL at 11:07

## 2019-07-19 NOTE — PLAN OF CARE
Problem: Adult Inpatient Plan of Care  Goal: Plan of Care Review  Outcome: Ongoing (interventions implemented as appropriate)  Pt complains of right armpain. Pain medication is effective. Incision is dry and intact. Pt denies numbness/tingling. C-collar is in place. IV abx given per order. No injuries. Will continue to monitor. 12 hour chart check is completed.

## 2019-07-19 NOTE — PLAN OF CARE
Problem: Adult Inpatient Plan of Care  Goal: Plan of Care Review  POC reviewed with patient. Pt verbalized understanding  Pt remains free of injuries and falls; fall precaution in place.   Intermittent complaints of pain on this shift.  IV remained intact.  IV abx administered per MAR.  C-collar in place at all times  Neuro checks x4  Bed low, side rails x2, call light in reach, personal belongings at bedside.  Reminded to call for assistance.  Chart check complete. Will continue to monitor.

## 2019-07-19 NOTE — CONSULTS
[facility]  Infectious Disease  Consult Note    Patient Name: Alex Soares  MRN: [id number]  Admission Date: 7/15/2019  Hospital Length of Stay: 4 days  Attending Physician: Elayne Akhtar MD  Primary Care Provider: Simi Macdonald MD     Isolation Status: No active isolations    Patient information was obtained from patient, past medical records and ER records.      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 I will follow-up with patient. Please contact us if you have any additional questions.    Rony Garcia MD  Infectious Disease  [facility]    Subjective:     Principal Problem: Cervical spinal cord compression    HPI: 28-year-old male with history of IV drug use, last use of heroin - a day prior to presentation .  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  No definite cord edema at this time.  Postcontrast imaging may be obtained as needed.  2. Vertebral body osteomyelitis of C5, C6, C7, and T1.  3. Anterior paraspinal phlegmon/abscess extending from mid C2 to mid T1    Since admission, MRI of the spinal axis -thoracic and lumbar did not show abscess except as above.  He was seen by Neurosurgery and had I and D done.  Prelim cultures -staph aureus       Past Medical History:   Diagnosis Date    Asthma        Past Surgical History:   Procedure Laterality Date    APPLICATION, ACELLULAR HUMAN DERMAL ALLOGRAFT N/A 7/17/2019    [facility]    CORPECTOMY, SPINE,  CERVICAL N/A 7/17/2019    Per[facility] by [facility] at [facility]    DECOMPRESSION AND FUSION, SPINE, CERVICAL, ANTERIOR APPROACH N/A 7/17/2019    Performed by [facility] at [facility] OR    INCISION AND DRAINAGE, ABSCESS N/A 7/17/2019    Performed by [facility] at [facility] OR    NO PAST SURGERIES         Review of patient's allergies indicates:   Allergen Reactions    Ceclor [cefaclor] Hives    Cefixime Hives    Cefzil [cefprozil] Hives       Medications:  No medications prior to admission.     Antibiotics (From admission, onward)    Start     Stop Route Frequency Ordered    07/17/19 1430  vancomycin in dextrose 5 % 1 gram/250 mL IVPB 1,000 mg      -- IV Every 8 hours (non-standard times) 07/17/19 1125    07/16/19 1915  aztreonam 1 g in dextrose 5 % 50 mL IVPB (ready to mix system)      -- IV Every 8 hours (non-standard times) 07/16/19 1807        Antifungals (From admission, onward)    None        Antivirals (From admission, onward)    None             There is no immunization history on file for this patient.    Family History     None        Social History     Socioeconomic History    Marital status: Single     Spouse name: Not on file    Number of children: Not on file    Years of education: Not on file    Highest education level: Not on file   Occupational History    Not on file   Social Needs    Financial resource strain: Not on file    Food insecurity:     Worry: Not on file     Inability: Not on file    Transportation needs:     Medical: Not on file     Non-medical: Not on file   Tobacco Use    Smoking status: Current Every Day Smoker     Packs/day: 1.00     Types: Cigarettes    Smokeless tobacco: Former User   Substance and Sexual Activity    Alcohol use: Yes     Comment: seldom    Drug use: Yes     Types: IV     Comment: heroin    Sexual activity: Not on file   Lifestyle    Physical activity:     Days per week: Not on file     Minutes per session: Not on file    Stress:  Not on file   Relationships    Social connections:     Talks on phone: Not on file     Gets together: Not on file     Attends Church service: Not on file     Active member of club or organization: Not on file     Attends meetings of clubs or organizations: Not on file     Relationship status: Not on file   Other Topics Concern    Not on file   Social History Narrative    Not on file     Review of Systems   Constitutional: Negative for chills, diaphoresis, fatigue and fever.   HENT: Negative for congestion, ear discharge, rhinorrhea, sinus pressure, sore throat and trouble swallowing.    Eyes: Negative for discharge and visual disturbance.   Respiratory: Negative for apnea, cough, choking, chest tightness, shortness of breath, wheezing and stridor.    Cardiovascular: Negative for chest pain, palpitations and leg swelling.   Gastrointestinal: Negative for abdominal distention, abdominal pain, diarrhea, nausea and vomiting.   Endocrine: Negative for cold intolerance and heat intolerance.   Genitourinary: Negative for dysuria, frequency and hematuria.   Musculoskeletal: Negative for arthralgias, back pain, myalgias and neck pain.   Skin: Negative for pallor and rash.   Neurological: Negative for dizziness, seizures, syncope, weakness and headaches.   Psychiatric/Behavioral: Negative for agitation, confusion and sleep disturbance.     Objective:     Vital Signs (Most Recent):  Temp: 97.8 °F (36.6 °C) (07/19/19 0720)  Pulse: 86 (07/19/19 0720)  Resp: 15 (07/19/19 0720)  BP: 132/75 (07/19/19 0720)  SpO2: 96 % (07/19/19 0720) Vital Signs (24h Range):  Temp:  [97.6 °F (36.4 °C)-98.2 °F (36.8 °C)] 97.8 °F (36.6 °C)  Pulse:  [73-90] 86  Resp:  [12-18] 15  SpO2:  [96 %-97 %] 96 %  BP: (125-139)/(74-78) 132/75     Weight: 66.1 kg (145 lb 11.6 oz)  Body mass index is 22.09 kg/m².    Estimated Creatinine Clearance: 146.9 mL/min (based on SCr of 0.7 mg/dL).    Physical Exam   Constitutional: He is oriented to person, place,  and time. No distress. Cervical collar in place.   HENT:   Mouth/Throat: No oropharyngeal exudate.   Eyes: Right eye exhibits no discharge. Left eye exhibits no discharge.   Neck: No JVD present.   Cardiovascular: Exam reveals no gallop and no friction rub.   No murmur heard.  Pulmonary/Chest: No stridor. No respiratory distress. He has no wheezes. He has no rales. He exhibits no tenderness.   Abdominal: He exhibits no distension and no mass. There is no tenderness. There is no rebound and no guarding. No hernia.   Musculoskeletal: He exhibits no edema or deformity.   Neurological: He is alert and oriented to person, place, and time.   Skin: Skin is warm and dry. Capillary refill takes less than 2 seconds. He is not diaphoretic.   Nursing note and vitals reviewed.      Significant Labs:   Blood Culture:   Recent Labs   Lab 07/15/19  1600 07/15/19  1619   LABBLOO No Growth to date  No Growth to date  No Growth to date  No Growth to date No Growth to date  No Growth to date  No Growth to date  No Growth to date     BMP:   Recent Labs   Lab 07/19/19  0424   *      K 3.9      CO2 26   BUN 14   CREATININE 0.7   CALCIUM 9.1     C4 Count: No results for input(s): C4 in the last 48 hours.  CBC:   Recent Labs   Lab 07/18/19  0424 07/19/19  0424   WBC 19.77* 13.81*   HGB 12.1* 11.7*   HCT 36.7* 35.3*   * 329     Wound Culture:   Recent Labs   Lab 07/17/19  1044   LABAERO STAPHYLOCOCCUS AUREUS  Many  Susceptibility pending  *       Significant Imaging: I have reviewed all pertinent imaging results/findings within the past 24 hours.

## 2019-07-19 NOTE — PT/OT/SLP PROGRESS
Physical Therapy  Treatment    Alex Soares   MRN: [id number]   Admitting Diagnosis: Cervical spinal cord compression    PT Received On: 07/19/19  PT Start Time: 0811     PT Stop Time: 0835    PT Total Time (min): 24 min       Billable Minutes:  Gait Training 14 and Therapeutic Exercise 10    Treatment Type: Treatment  PT/PTA: PT             General Precautions: Standard, fall  Orthopedic Precautions: N/A   Braces: N/A         Subjective:  Communicated with NURSE AND Epic CHART REVIEW prior to session.   PT AGREED TO TX .    Pain/Comfort  Pain Rating 1: 0/10  Pain Rating Post-Intervention 1: 0/10    Objective:   Patient found with: telemetry, peripheral IV    Functional Mobility:  PT SUP.SIT EOB WITH CERVICAL COLLAR ON. PT STOOD WITH NO AD AND SBA FOR GT X 700' WITH 2 STAND REST BREAKS AND SBA>CGA AND ATAXIC GT. PT RETURNED TO RM T/F TO CHAIR FOR REST  BREAK. PT STOOD AND COMPLETED STANDING TE X 20 REPS OF AP, HIP ADD/ABD AND MIP. PT RETURNED TO RM T/F TO BEDSIDE AND LEFT WITH BREAKFAST PRESENT AND ALL NEEDS MET.     AM-PAC 6 CLICK MOBILITY  How much help from another person does this patient currently need?   1 = Unable, Total/Dependent Assistance  2 = A lot, Maximum/Moderate Assistance  3 = A little, Minimum/Contact Guard/Supervision  4 = None, Modified [place]/Independent    Turning over in bed (including adjusting bedclothes, sheets and blankets)?: 4  Sitting down on and standing up from a chair with arms (e.g., wheelchair, bedside commode, etc.): 4  Moving from lying on back to sitting on the side of the bed?: 4  Moving to and from a bed to a chair (including a wheelchair)?: 4  Need to walk in hospital room?: 3  Climbing 3-5 steps with a railing?: 1  Basic Mobility Total Score: 20    AM-PAC Raw Score CMS G-Code Modifier Level of Impairment Assistance   6 % Total / Unable   7 - 9 CM 80 - 100% Maximal Assist   10 - 14 CL 60 - 80% Moderate Assist   15 - 19 CK 40 - 60% Moderate Assist   20 - 22 CJ  20 - 40% Minimal Assist   23 CI 1-20% SBA / CGA   24 CH 0% Independent/ Mod I     Patient left SEATED EOB with call button in reach and BROTHER present.    Assessment:  PT PROGRESSING WITH GT    Rehab identified problem list/impairments: Rehab identified problem list/impairments: weakness, gait instability, impaired balance, impaired endurance, impaired functional mobilty, decreased lower extremity function, orthopedic precautions    Rehab potential is good.    Activity tolerance: Good    Discharge recommendations: Discharge Facility/Level of Care Needs: outpatient PT     Barriers to discharge:      Equipment recommendations: Equipment Needed After Discharge: none     GOALS:   Multidisciplinary Problems     Physical Therapy Goals        Problem: Physical Therapy Goal    Goal Priority Disciplines Outcome Goal Variances Interventions   Physical Therapy Goal     PT, PT/OT Ongoing (interventions implemented as appropriate)     Description:  PT WILL BE SEEN FOR P.T. FOR A MIN OF 5 OUT OF 7 DAYS A WEEK  LT19  1. PT WILL COMPLETE BED MOBILITY IND  2. PT WILL T/F TO CHAIR IND  3. PT WILL GT TRAIN X 500' IND   4. PT WILL COMPLETE B LE TE X 20 REPS                      PLAN:    Patient to be seen to address the above listed problems via gait training, therapeutic activities, therapeutic exercises  Plan of Care expires: 19  Plan of Care reviewed with: patient         Abby Reich, PT  2019

## 2019-07-19 NOTE — SUBJECTIVE & OBJECTIVE
Past Medical History:   Diagnosis Date    Asthma        Past Surgical History:   Procedure Laterality Date    APPLICATION, ACELLULAR HUMAN DERMAL ALLOGRAFT N/A 7/17/2019    Performed by [facility] at [facility] OR    CORPECTOMY, SPINE, CERVICAL N/A 7/17/2019    Performed by [facility] at [facility] OR    DECOMPRESSION AND FUSION, SPINE, CERVICAL, ANTERIOR APPROACH N/A 7/17/2019    Performed by [facility] at [facility] OR    INCISION AND DRAINAGE, ABSCESS N/A 7/17/2019    Performed by [facility] at [facility] OR    NO PAST SURGERIES         Review of patient's allergies indicates:   Allergen Reactions    Ceclor [cefaclor] Hives    Cefixime Hives    Cefzil [cefprozil] Hives       Medications:  No medications prior to admission.     Antibiotics (From admission, onward)    Start     Stop Route Frequency Ordered    07/17/19 1430  vancomycin in dextrose 5 % 1 gram/250 mL IVPB 1,000 mg      -- IV Every 8 hours (non-standard times) 07/17/19 1125    07/16/19 1915  aztreonam 1 g in dextrose 5 % 50 mL IVPB (ready to mix system)      -- IV Every 8 hours (non-standard times) 07/16/19 1807        Antifungals (From admission, onward)    None        Antivirals (From admission, onward)    None             There is no immunization history on file for this patient.    Family History     None        Social History     Socioeconomic History    Marital status: Single     Spouse name: Not on file    Number of children: Not on file    Years of education: Not on file    Highest education level: Not on file   Occupational History    Not on file   Social Needs    Financial resource strain: Not on file    Food insecurity:     Worry: Not on file     Inability: Not on file    Transportation needs:     Medical: Not on file     Non-medical: Not on file   Tobacco Use    Smoking status: Current Every Day Smoker     Packs/day: 1.00     Types: Cigarettes    Smokeless tobacco: Former User   Substance and Sexual  Activity    Alcohol use: Yes     Comment: seldom    Drug use: Yes     Types: IV     Comment: heroin    Sexual activity: Not on file   Lifestyle    Physical activity:     Days per week: Not on file     Minutes per session: Not on file    Stress: Not on file   Relationships    Social connections:     Talks on phone: Not on file     Gets together: Not on file     Attends [employer] service: Not on file     Active member of club or organization: Not on file     Attends meetings of clubs or organizations: Not on file     Relationship status: Not on file   Other Topics Concern    Not on file   Social History Narrative    Not on file     Review of Systems   Constitutional: Negative for chills, diaphoresis, fatigue and fever.   HENT: Negative for congestion, ear discharge, rhinorrhea, sinus pressure, sore throat and trouble swallowing.    Eyes: Negative for discharge and visual disturbance.   Respiratory: Negative for apnea, cough, choking, chest tightness, shortness of breath, wheezing and stridor.    Cardiovascular: Negative for chest pain, palpitations and leg swelling.   Gastrointestinal: Negative for abdominal distention, abdominal pain, diarrhea, nausea and vomiting.   Endocrine: Negative for cold intolerance and heat intolerance.   Genitourinary: Negative for dysuria, frequency and hematuria.   Musculoskeletal: Negative for arthralgias, back pain, myalgias and neck pain.   Skin: Negative for pallor and rash.   Neurological: Negative for dizziness, seizures, syncope, weakness and headaches.   Psychiatric/Behavioral: Negative for agitation, confusion and sleep disturbance.     Objective:     Vital Signs (Most Recent):  Temp: 97.8 °F (36.6 °C) (07/19/19 0720)  Pulse: 86 (07/19/19 0720)  Resp: 15 (07/19/19 0720)  BP: 132/75 (07/19/19 0720)  SpO2: 96 % (07/19/19 0720) Vital Signs (24h Range):  Temp:  [97.6 °F (36.4 °C)-98.2 °F (36.8 °C)] 97.8 °F (36.6 °C)  Pulse:  [73-90] 86  Resp:  [12-18] 15  SpO2:  [96 %-97 %]  96 %  BP: (125-139)/(74-78) 132/75     Weight: 66.1 kg (145 lb 11.6 oz)  Body mass index is 22.09 kg/m².    Estimated Creatinine Clearance: 146.9 mL/min (based on SCr of 0.7 mg/dL).    Physical Exam   Constitutional: He is oriented to person, place, and time. No distress. Cervical collar in place.   HENT:   Mouth/Throat: No oropharyngeal exudate.   Eyes: Right eye exhibits no discharge. Left eye exhibits no discharge.   Neck: No JVD present.   Cardiovascular: Exam reveals no gallop and no friction rub.   No murmur heard.  Pulmonary/Chest: No stridor. No respiratory distress. He has no wheezes. He has no rales. He exhibits no tenderness.   Abdominal: He exhibits no distension and no mass. There is no tenderness. There is no rebound and no guarding. No hernia.   Musculoskeletal: He exhibits no edema or deformity.   Neurological: He is alert and oriented to person, place, and time.   Skin: Skin is warm and dry. Capillary refill takes less than 2 seconds. He is not diaphoretic.   Nursing note and vitals reviewed.      Significant Labs:   Blood Culture:   Recent Labs   Lab 07/15/19  1600 07/15/19  1619   LABBLOO No Growth to date  No Growth to date  No Growth to date  No Growth to date No Growth to date  No Growth to date  No Growth to date  No Growth to date     BMP:   Recent Labs   Lab 07/19/19  0424   *      K 3.9      CO2 26   BUN 14   CREATININE 0.7   CALCIUM 9.1     C4 Count: No results for input(s): C4 in the last 48 hours.  CBC:   Recent Labs   Lab 07/18/19  0424 07/19/19  0424   WBC 19.77* 13.81*   HGB 12.1* 11.7*   HCT 36.7* 35.3*   * 329     Wound Culture:   Recent Labs   Lab 07/17/19  1044   LABAERO STAPHYLOCOCCUS AUREUS  Many  Susceptibility pending  *       Significant Imaging: I have reviewed all pertinent imaging results/findings within the past 24 hours.

## 2019-07-19 NOTE — PROGRESS NOTES
[facility]   Neurosurgery  Progress Note    Subjective:     History of Present Illness:   No notes on file.    POD 2  Patient doing well this morning  No new complaints  Tolerating diet  Numbness, tingling, weakness and pain much improved  WBC trending down (19.77 to 13.81)      Post-Op Info:  Procedure(s) (LRB):  DECOMPRESSION AND FUSION, SPINE, CERVICAL, ANTERIOR APPROACH (N/A)  CORPECTOMY, SPINE, CERVICAL (N/A)  INCISION AND DRAINAGE, ABSCESS (N/A)  APPLICATION, ACELLULAR HUMAN DERMAL ALLOGRAFT (N/A)   2 Days Post-Op      Medications:  Continuous Infusions:  Scheduled Meds:   aztreonam  1,000 mg Intravenous Q8H    dexamethasone  4 mg Intravenous Q6H    docusate sodium  100 mg Oral Daily    famotidine  20 mg Oral BID    multivitamin  1 tablet Oral Daily    nicotine  1 patch Transdermal Daily    nozaseptin   Each Nare BID    vancomycin (VANCOCIN) IVPB  1,000 mg Intravenous Q8H     PRN Meds:acetaminophen, aluminum-magnesium hydroxide-simethicone, diazePAM, diphenhydrAMINE, HYDROmorphone, HYDROmorphone, ibuprofen, ondansetron, oxyCODONE, oxyCODONE-acetaminophen, oxyCODONE-acetaminophen, promethazine, ramelteon, senna-docusate 8.6-50 mg       Objective:     Weight: 66.1 kg (145 lb 11.6 oz)  Body mass index is 22.09 kg/m².  Vital Signs (Most Recent):  Temp: 97.8 °F (36.6 °C) (07/19/19 0720)  Pulse: 86 (07/19/19 0720)  Resp: 15 (07/19/19 0720)  BP: 132/75 (07/19/19 0720)  SpO2: 96 % (07/19/19 0720) Vital Signs (24h Range):  Temp:  [97.6 °F (36.4 °C)-98.2 °F (36.8 °C)] 97.8 °F (36.6 °C)  Pulse:  [73-90] 86  Resp:  [12-18] 15  SpO2:  [96 %-97 %] 96 %  BP: (125-139)/(74-78) 132/75     Date 07/19/19 0700 - 07/20/19 0659   Shift 4279-2114 7623-3376 4948-8880 24 Hour Total   INTAKE   P.O. 360   360   Shift Total(mL/kg) 360(5.4)   360(5.4)   OUTPUT   Shift Total(mL/kg)       Weight (kg) 66.1 66.1 66.1 66.1                        Neurosurgery Physical Exam     MAEW  Incision CDI  Trach  midline  Tolerating diet well  Aspen collar in place      Significant Labs:  Recent Labs   Lab 07/18/19  0424 07/19/19  0424   * 128*    140   K 4.0 3.9    104   CO2 23 26   BUN 14 14   CREATININE 0.7 0.7   CALCIUM 9.0 9.1     Recent Labs   Lab 07/18/19  0424 07/19/19  0424   WBC 19.77* 13.81*   HGB 12.1* 11.7*   HCT 36.7* 35.3*   * 329     No results for input(s): LABPT, INR, APTT in the last 48 hours.  Microbiology Results (last 7 days)     Procedure Component Value Units Date/Time    AFB Culture & Smear [[id number]] Collected:  07/17/19 1044    Order Status:  Completed Specimen:  Bone from Vertebra Updated:  07/19/19 0927     AFB Culture & Smear Culture in progress     AFB CULTURE STAIN No acid fast bacilli seen.    Blood culture [[id number]] Collected:  07/15/19 1619    Order Status:  Completed Specimen:  Blood from Peripheral, Upper Arm, Left Updated:  07/19/19 0612     Blood Culture, Routine No Growth to date      No Growth to date      No Growth to date      No Growth to date    Blood culture [[id number]] Collected:  07/15/19 1600    Order Status:  Completed Specimen:  Blood from Peripheral, Upper Arm, Left Updated:  07/19/19 0612     Blood Culture, Routine No Growth to date      No Growth to date      No Growth to date      No Growth to date    Culture, Anaerobe [[id number]] Collected:  07/17/19 1044    Order Status:  Completed Specimen:  Bone from Vertebra Updated:  07/18/19 0910     Anaerobic Culture Culture in progress    Aerobic culture [[id number]] Collected:  07/17/19 1044    Order Status:  Completed Specimen:  Bone from Vertebra Updated:  07/18/19 0635     Aerobic Bacterial Culture No growth    Gram stain [[id number]] Collected:  07/17/19 1044    Order Status:  Completed Specimen:  Bone from Vertebra Updated:  07/17/19 2222     Gram Stain Result Rare WBC's      No organisms seen    Fungus culture [[id number]] Collected:  07/17/19 1044    Order Status:  Sent Specimen:  Bone from  Vertebra Updated:  07/17/19 2015    Gram stain [[id number]]     Order Status:  Canceled Specimen:  Tissue     Culture, Anaerobic [[id number]]     Order Status:  Canceled Specimen:  Tissue     AFB Culture & Smear [[id number]]     Order Status:  Canceled Specimen:  Tissue     Fungus culture [[id number]]     Order Status:  Canceled Specimen:  Tissue     Tissue culture [[id number]]     Order Status:  Canceled Specimen:  Tissue         CMP:   Recent Labs   Lab 07/18/19  0424 07/19/19  0424   * 128*   CALCIUM 9.0 9.1    140   K 4.0 3.9   CO2 23 26    104   BUN 14 14   CREATININE 0.7 0.7     CRP: No results for input(s): CRP in the last 48 hours.  ESR: No results for input(s): POCESR, ERYTHROCYTES in the last 48 hours.  Recent Lab Results       07/19/19  0606   07/19/19 0424        Anion Gap   10     Baso #   0.00     Basophil%   0.0     BUN, Bld   14     Calcium   9.1     Chloride   104     CO2   26     Creatinine   0.7     Differential Method   Automated     eGFR if    >60     eGFR if non    >60  Comment:  Calculation used to obtain the estimated glomerular filtration  rate (eGFR) is the CKD-EPI equation.        Eos #   0.0     Eosinophil%   0.0     Glucose   128     Gran # (ANC)   11.7     Gran%   85.2     Hematocrit   35.3     Hemoglobin   11.7     Lymph #   1.4     Lymph%   10.2     MCH   28.3     MCHC   33.1     MCV   85     Mono #   0.7     Mono%   4.9     MPV   9.1     Platelets   329     Potassium   3.9     RBC   4.14     RDW   12.6     Sodium   140     Vancomycin-Trough 5.0       WBC   13.81         All pertinent labs from the last 24 hours have been reviewed.  Significant Diagnostics:  I have reviewed all pertinent imaging results/findings within the past 24 hours.    Assessment/Plan:     Active Diagnoses:    Diagnosis Date Noted POA    PRINCIPAL PROBLEM:  Cervical spinal cord compression [G95.20] 07/15/2019 Yes     Chronic    Abscess in epidural space of cervical  spine [G06.1] 07/16/2019 Yes    Spondylodiscitis [M46.40] 07/15/2019 Yes    Bon's abscess of cervical spine (C6-C7 abscess with surrounding vertebral osteo C5-T1) [M86.8X8] 07/15/2019 Yes    IV drug user [F19.90] 07/15/2019 Yes    Hypokalemia [E87.6] 07/15/2019 Yes    Elevated BP without diagnosis of hypertension [R03.0] 07/15/2019 Yes    Sepsis [A41.9] 07/15/2019 Yes      Problems Resolved During this Admission:       Doing well POD 2  Keep Aspen collar in place at all times, except if taking shower  Await final results from cultures  Plan for Abx coverage based on cultures per Dr. Garcia  Discharge pending Abx and Dr. Garcia recommendations      Neelam Bhat PA-C  Neurosurgery  [facility]

## 2019-07-19 NOTE — ASSESSMENT & PLAN NOTE
07/18- will need up to 6-8 weeks of antibiotics preferably IV therapy -will use cultures to guide therapy , continue Vancomycin/Meropenem for now

## 2019-07-19 NOTE — HPI
28-year-old male with history of IV drug use, last use of heroin - a day prior to presentation .  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  No definite cord edema at this time.  Postcontrast imaging may be obtained as needed.  2. Vertebral body osteomyelitis of C5, C6, C7, and T1.  3. Anterior paraspinal phlegmon/abscess extending from mid C2 to mid T1    Since admission, MRI of the spinal axis -thoracic and lumbar did not show abscess except as above.  He was seen by Neurosurgery and had I and D done.  Prelim cultures -staph aureus

## 2019-07-19 NOTE — PLAN OF CARE
Problem: Physical Therapy Goal  Goal: Physical Therapy Goal  PT WILL BE SEEN FOR P.T. FOR A MIN OF 5 OUT OF 7 DAYS A WEEK  LT19  1. PT WILL COMPLETE BED MOBILITY IND  2. PT WILL T/F TO CHAIR IND  3. PT WILL GT TRAIN X 500' IND   4. PT WILL COMPLETE B LE TE X 20 REPS     Outcome: Ongoing (interventions implemented as appropriate)  PT GT TRAINED X 700' WITH SBA>CGA WITH INC FATIGUE.

## 2019-07-19 NOTE — PROGRESS NOTES
Pharmacokinetic Assessment Follow Up: IV Vancomycin    Vancomycin serum concentration assessment(s):    Vancomycin trough: 5.0 mcg/ml   The trough level was drawn correctly and can be used to guide therapy at this time.    Vancomycin Regimen Plan:    Patient received 1gm dose this morning. A 500mg dose will be ordered now (1500mg total dose)   Change regimen to Vancomycin 1250 mg IV every 8 hours with next serum trough concentration measured at 1130 prior to 4th dose on 7/20.     Pharmacy will continue to follow and monitor vancomycin.    Please contact pharmacy at extension [phone] for questions regarding this assessment.    Thank you for the consult,   Judie Mejias     Patient brief summary:  Alex Soares is a 28 y.o. male initiated on antimicrobial therapy with IV Vancomycin for treatment of suspected bone/joint      Drug Allergies:   Review of patient's allergies indicates:   Allergen Reactions    Ceclor [cefaclor] Hives    Cefixime Hives    Cefzil [cefprozil] Hives       Actual Body Weight:   66.1 kg    Renal Function:   Estimated Creatinine Clearance: 146.9 mL/min (based on SCr of 0.7 mg/dL).,     Dialysis Method (if applicable):  N/A     CBC (last 72 hours):  Recent Labs   Lab Result Units 07/17/19 0519 07/18/19 0424 07/19/19 0424   WBC K/uL 14.57* 19.77* 13.81*   Hemoglobin g/dL 13.3* 12.1* 11.7*   Hematocrit % 39.7* 36.7* 35.3*   Platelets K/uL 383* 386* 329   Gran% % 85.7* 89.5* 85.2*   Lymph% % 10.8* 6.8* 10.2*   Mono% % 3.8* 3.9* 4.9   Eosinophil% % 0.0 0.0 0.0   Basophil% % 0.0 0.1 0.0   Differential Method  Automated Automated Automated       Metabolic Panel (last 72 hours):  Recent Labs   Lab Result Units 07/17/19 0519 07/18/19 0424 07/19/19  0424   Sodium mmol/L 140 139 140   Potassium mmol/L 4.0 4.0 3.9   Chloride mmol/L 106 105 104   CO2 mmol/L 23 23 26   Glucose mg/dL 139* 131* 128*   BUN, Bld mg/dL 14 14 14   Creatinine mg/dL 0.7 0.7 0.7       Vancomycin  Administrations:  vancomycin given in the last 96 hours                     vancomycin in dextrose 5 % 1 gram/250 mL IVPB 1,000 mg (mg) 1,000 mg New Bag 07/19/19 0837     1,000 mg New Bag 07/18/19 2347     1,000 mg New Bag  1352     1,000 mg New Bag  0633     1,000 mg New Bag 07/17/19 2318     1,000 mg New Bag  1419                      Drug levels (last 3 results):  Recent Labs   Lab Result Units 07/17/19  0519 07/18/19  0424 07/19/19  0606   Vancomycin-Trough ug/mL 3.9* 14.3 5.0*       Microbiologic Results:  Microbiology Results (last 7 days)       Procedure Component Value Units Date/Time    Culture, Anaerobe [[id number]] Collected:  07/17/19 1044    Order Status:  Completed Specimen:  Bone from Vertebra Updated:  07/19/19 1122     Anaerobic Culture Culture in progress    Aerobic culture [[id number]]  (Abnormal) Collected:  07/17/19 1044    Order Status:  Completed Specimen:  Bone from Vertebra Updated:  07/19/19 1045     Aerobic Bacterial Culture STAPHYLOCOCCUS AUREUS  Many  Susceptibility pending      AFB Culture & Smear [[id number]] Collected:  07/17/19 1044    Order Status:  Completed Specimen:  Bone from Vertebra Updated:  07/19/19 0927     AFB Culture & Smear Culture in progress     AFB CULTURE STAIN No acid fast bacilli seen.    Blood culture [[id number]] Collected:  07/15/19 1619    Order Status:  Completed Specimen:  Blood from Peripheral, Upper Arm, Left Updated:  07/19/19 0612     Blood Culture, Routine No Growth to date      No Growth to date      No Growth to date      No Growth to date    Blood culture [[id number]] Collected:  07/15/19 1600    Order Status:  Completed Specimen:  Blood from Peripheral, Upper Arm, Left Updated:  07/19/19 0612     Blood Culture, Routine No Growth to date      No Growth to date      No Growth to date      No Growth to date    Gram stain [[id number]] Collected:  07/17/19 1044    Order Status:  Completed Specimen:  Bone from Vertebra Updated:  07/17/19 2222     Gram Stain  Result Rare WBC's      No organisms seen    Fungus culture [[id number]] Collected:  07/17/19 1044    Order Status:  Sent Specimen:  Bone from Vertebra Updated:  07/17/19 2015    Gram stain [[id number]]     Order Status:  Canceled Specimen:  Tissue     Culture, Anaerobic [[id number]]     Order Status:  Canceled Specimen:  Tissue     AFB Culture & Smear [[id number]]     Order Status:  Canceled Specimen:  Tissue     Fungus culture [[id number]]     Order Status:  Canceled Specimen:  Tissue     Tissue culture [[id number]]     Order Status:  Canceled Specimen:  Tissue

## 2019-07-20 LAB
ANION GAP SERPL CALC-SCNC: 12 MMOL/L (ref 8–16)
BACTERIA SPEC AEROBE CULT: ABNORMAL
BASOPHILS # BLD AUTO: 0.01 K/UL (ref 0–0.2)
BASOPHILS NFR BLD: 0.1 % (ref 0–1.9)
BUN SERPL-MCNC: 12 MG/DL (ref 6–20)
CALCIUM SERPL-MCNC: 9.4 MG/DL (ref 8.7–10.5)
CHLORIDE SERPL-SCNC: 104 MMOL/L (ref 95–110)
CO2 SERPL-SCNC: 24 MMOL/L (ref 23–29)
CREAT SERPL-MCNC: 0.7 MG/DL (ref 0.5–1.4)
DIFFERENTIAL METHOD: ABNORMAL
EOSINOPHIL # BLD AUTO: 0 K/UL (ref 0–0.5)
EOSINOPHIL NFR BLD: 0 % (ref 0–8)
ERYTHROCYTE [DISTWIDTH] IN BLOOD BY AUTOMATED COUNT: 12.8 % (ref 11.5–14.5)
EST. GFR  (AFRICAN AMERICAN): >60 ML/MIN/1.73 M^2
EST. GFR  (NON AFRICAN AMERICAN): >60 ML/MIN/1.73 M^2
GLUCOSE SERPL-MCNC: 115 MG/DL (ref 70–110)
HCT VFR BLD AUTO: 40.1 % (ref 40–54)
HGB BLD-MCNC: 13.1 G/DL (ref 14–18)
LYMPHOCYTES # BLD AUTO: 1.5 K/UL (ref 1–4.8)
LYMPHOCYTES NFR BLD: 10.6 % (ref 18–48)
MCH RBC QN AUTO: 28.3 PG (ref 27–31)
MCHC RBC AUTO-ENTMCNC: 32.7 G/DL (ref 32–36)
MCV RBC AUTO: 87 FL (ref 82–98)
MONOCYTES # BLD AUTO: 0.3 K/UL (ref 0.3–1)
MONOCYTES NFR BLD: 2.4 % (ref 4–15)
NEUTROPHILS # BLD AUTO: 12 K/UL (ref 1.8–7.7)
NEUTROPHILS NFR BLD: 87.3 % (ref 38–73)
PLATELET # BLD AUTO: 164 K/UL (ref 150–350)
PMV BLD AUTO: 10.8 FL (ref 9.2–12.9)
POTASSIUM SERPL-SCNC: 4.2 MMOL/L (ref 3.5–5.1)
RBC # BLD AUTO: 4.63 M/UL (ref 4.6–6.2)
SODIUM SERPL-SCNC: 140 MMOL/L (ref 136–145)
VANCOMYCIN TROUGH SERPL-MCNC: 14.9 UG/ML (ref 10–22)
WBC # BLD AUTO: 13.85 K/UL (ref 3.9–12.7)

## 2019-07-20 PROCEDURE — 94799 UNLISTED PULMONARY SVC/PX: CPT

## 2019-07-20 PROCEDURE — 25000003 PHARM REV CODE 250: Performed by: PHYSICIAN ASSISTANT

## 2019-07-20 PROCEDURE — 85025 COMPLETE CBC W/AUTO DIFF WBC: CPT

## 2019-07-20 PROCEDURE — 63600175 PHARM REV CODE 636 W HCPCS: Performed by: INTERNAL MEDICINE

## 2019-07-20 PROCEDURE — 11000001 HC ACUTE MED/SURG PRIVATE ROOM

## 2019-07-20 PROCEDURE — 25000003 PHARM REV CODE 250: Performed by: INTERNAL MEDICINE

## 2019-07-20 PROCEDURE — 97116 GAIT TRAINING THERAPY: CPT

## 2019-07-20 PROCEDURE — 63600175 PHARM REV CODE 636 W HCPCS: Performed by: PHYSICIAN ASSISTANT

## 2019-07-20 PROCEDURE — 36415 COLL VENOUS BLD VENIPUNCTURE: CPT

## 2019-07-20 PROCEDURE — 80048 BASIC METABOLIC PNL TOTAL CA: CPT

## 2019-07-20 PROCEDURE — S4991 NICOTINE PATCH NONLEGEND: HCPCS | Performed by: PHYSICIAN ASSISTANT

## 2019-07-20 PROCEDURE — 80202 ASSAY OF VANCOMYCIN: CPT

## 2019-07-20 RX ADMIN — OXYCODONE HYDROCHLORIDE AND ACETAMINOPHEN 1 TABLET: 7.5; 325 TABLET ORAL at 03:07

## 2019-07-20 RX ADMIN — VANCOMYCIN HYDROCHLORIDE 1250 MG: 100 INJECTION, POWDER, LYOPHILIZED, FOR SOLUTION INTRAVENOUS at 07:07

## 2019-07-20 RX ADMIN — RAMELTEON 8 MG: 8 TABLET, FILM COATED ORAL at 08:07

## 2019-07-20 RX ADMIN — THERA TABS 1 TABLET: TAB at 08:07

## 2019-07-20 RX ADMIN — Medication 1 PATCH: at 08:07

## 2019-07-20 RX ADMIN — FAMOTIDINE 20 MG: 20 TABLET ORAL at 08:07

## 2019-07-20 RX ADMIN — DEXAMETHASONE SODIUM PHOSPHATE 4 MG: 4 INJECTION, SOLUTION INTRA-ARTICULAR; INTRALESIONAL; INTRAMUSCULAR; INTRAVENOUS; SOFT TISSUE at 11:07

## 2019-07-20 RX ADMIN — DEXAMETHASONE SODIUM PHOSPHATE 4 MG: 4 INJECTION, SOLUTION INTRA-ARTICULAR; INTRALESIONAL; INTRAMUSCULAR; INTRAVENOUS; SOFT TISSUE at 05:07

## 2019-07-20 RX ADMIN — VANCOMYCIN HYDROCHLORIDE 1250 MG: 100 INJECTION, POWDER, LYOPHILIZED, FOR SOLUTION INTRAVENOUS at 12:07

## 2019-07-20 RX ADMIN — VANCOMYCIN HYDROCHLORIDE 1250 MG: 100 INJECTION, POWDER, LYOPHILIZED, FOR SOLUTION INTRAVENOUS at 03:07

## 2019-07-20 RX ADMIN — OXYCODONE HYDROCHLORIDE AND ACETAMINOPHEN 1 TABLET: 7.5; 325 TABLET ORAL at 07:07

## 2019-07-20 RX ADMIN — DEXAMETHASONE SODIUM PHOSPHATE 4 MG: 4 INJECTION, SOLUTION INTRA-ARTICULAR; INTRALESIONAL; INTRAMUSCULAR; INTRAVENOUS; SOFT TISSUE at 12:07

## 2019-07-20 RX ADMIN — DOCUSATE SODIUM 100 MG: 100 CAPSULE, LIQUID FILLED ORAL at 08:07

## 2019-07-20 RX ADMIN — DEXAMETHASONE SODIUM PHOSPHATE 4 MG: 4 INJECTION, SOLUTION INTRA-ARTICULAR; INTRALESIONAL; INTRAMUSCULAR; INTRAVENOUS; SOFT TISSUE at 06:07

## 2019-07-20 RX ADMIN — OXYCODONE HYDROCHLORIDE AND ACETAMINOPHEN 1 TABLET: 7.5; 325 TABLET ORAL at 12:07

## 2019-07-20 RX ADMIN — OXYCODONE HYDROCHLORIDE AND ACETAMINOPHEN 1 TABLET: 7.5; 325 TABLET ORAL at 09:07

## 2019-07-20 RX ADMIN — OXYCODONE HYDROCHLORIDE AND ACETAMINOPHEN 1 TABLET: 7.5; 325 TABLET ORAL at 04:07

## 2019-07-20 NOTE — ASSESSMENT & PLAN NOTE
07/17- Incision and drainage   ID following, plan on 6-8 weeks of antibiotic therapy,  waiting further recommendation from ID. Continue Vancomycin IV for now.

## 2019-07-20 NOTE — ASSESSMENT & PLAN NOTE
Mg/johnson  Neurosurgery managing.   Cervical collar inplace  Further evaluation/diagnostics/interventions/consults pending course            You are scheduled for an upgrade to a biventricular defibrillator system at [facility] on Friday, January 26th. Please arrive at the patient registration desk located on the 2nd floor of the main building at 1:00pm.    Do not eat or drink anything after midnight the night before your procedure. You will need a . You may take your normal medications with a sip of water the morning of your procedure. Except for the following: Metformin. Hold Metformin morning of procedure. Blood thinner instructions: Continue 81mg aspirin. Lab instructions: Please have labs drawn 7-10 days prior to scheduled procedure at any [facility] location. Please call Graeme Carrera or Gianluca Pierson if you have any questions at [phone]. Please call the office if you develop any type of illness prior to your procedure.

## 2019-07-20 NOTE — SUBJECTIVE & OBJECTIVE
Interval History: Pt seen and examined. No distress. Vital signs and labs stable. ID following, awaiting further recommends on 6-8 weeks of antibiotic.      Review of Systems   Constitutional: Negative for chills, diaphoresis, fatigue and fever.   HENT: Negative for congestion, ear discharge, rhinorrhea, sinus pressure, sore throat and trouble swallowing.    Eyes: Negative for discharge and visual disturbance.   Respiratory: Negative for apnea, cough, choking, chest tightness, shortness of breath, wheezing and stridor.    Cardiovascular: Negative for chest pain, palpitations and leg swelling.   Gastrointestinal: Negative for abdominal distention, abdominal pain, diarrhea, nausea and vomiting.   Endocrine: Negative for cold intolerance and heat intolerance.   Genitourinary: Negative for dysuria, frequency and hematuria.   Musculoskeletal: Negative for arthralgias, back pain, myalgias and neck pain.   Skin: Negative for pallor and rash.   Neurological: Negative for dizziness, seizures, syncope, weakness and headaches.   Psychiatric/Behavioral: Negative for agitation, confusion and sleep disturbance.     Objective:     Vital Signs (Most Recent):  Temp: 98.1 °F (36.7 °C) (07/20/19 1151)  Pulse: 82 (07/20/19 1151)  Resp: 17 (07/20/19 1151)  BP: 132/76 (07/20/19 1151)  SpO2: 97 % (07/20/19 1151) Vital Signs (24h Range):  Temp:  [97.6 °F (36.4 °C)-98.4 °F (36.9 °C)] 98.1 °F (36.7 °C)  Pulse:  [63-87] 82  Resp:  [16-18] 17  SpO2:  [96 %-98 %] 97 %  BP: (132-135)/(76-85) 132/76     Weight: 66.1 kg (145 lb 11.6 oz)  Body mass index is 22.09 kg/m².    Intake/Output Summary (Last 24 hours) at 7/20/2019 1412  Last data filed at 7/20/2019 0534  Gross per 24 hour   Intake 860 ml   Output --   Net 860 ml      Physical Exam   Constitutional: He is oriented to person, place, and time. No distress. Cervical collar in place.   HENT:   Mouth/Throat: No oropharyngeal exudate.   Neck incision intact, no drainage.    Eyes: Right eye  exhibits no discharge. Left eye exhibits no discharge.   Neck: No JVD present.   Cardiovascular: Exam reveals no gallop and no friction rub.   No murmur heard.  Pulmonary/Chest: No stridor. No respiratory distress. He has no wheezes. He has no rales. He exhibits no tenderness.   Abdominal: He exhibits no distension and no mass. There is no tenderness. There is no rebound and no guarding. No hernia.   Musculoskeletal: He exhibits no edema or deformity.   Neurological: He is alert and oriented to person, place, and time.   Skin: Skin is warm and dry. Capillary refill takes less than 2 seconds. He is not diaphoretic.   Nursing note and vitals reviewed.      Significant Labs:   CBC:   Recent Labs   Lab 07/19/19 0424 07/20/19  0716   WBC 13.81* 13.85*   HGB 11.7* 13.1*   HCT 35.3* 40.1    164     CMP:   Recent Labs   Lab 07/19/19  0424 07/20/19  0513    140   K 3.9 4.2    104   CO2 26 24   * 115*   BUN 14 12   CREATININE 0.7 0.7   CALCIUM 9.1 9.4   ANIONGAP 10 12   EGFRNONAA >60 >60       Significant Imaging:     Imaging Results          MRI Cervical Spine Without Contrast (Final result)  Result time 07/15/19 15:59:07    Final result by Paco Merritt Jr., MD (07/15/19 15:59:07)                 Impression:      1. 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  No definite cord edema at this time.  Postcontrast imaging may be obtained as needed.  2. Vertebral body osteomyelitis of C5, C6, C7, and T1.  3. Anterior paraspinal phlegmon/abscess extending from mid C2 to mid T1.  4. Foraminal stenosis at C4-C5, C5-C6, and C6-C7.      Electronically signed by: Paco Merritt Jr., MD  Date:    07/15/2019  Time:    15:59             Narrative:    EXAMINATION:  MRI CERVICAL SPINE WITHOUT CONTRAST    CLINICAL HISTORY:  Spine fracture, traumatic, cervical;    TECHNIQUE:  MR Cervical spine without contrast. Sagittal T1, T2, STIR.  Axial 3D, T2. Coronal T1.    COMPARISON:  None available.    FINDINGS:  There is erosive endplate change, complete disc space height loss, and wedge deformity of the C6 and C7 vertebral bodies with abnormal marrow signal most consistent with spondylodiscitis.  There is also abnormal marrow signal within the bodies of C5 and T1.  There is disc space abscess/phlegmon/retropulsion relating to wedge compression deformity at C6-C7 that posteriorly protrudes and flattens the thecal sac to 5 mm AP.  The ligamentum flavum/interspinous ligaments at C5-C6 are stretched taut posteriorly but are intact.  There is extensive anterior paraspinal swelling/edema possibly with central loculated abscess collections.  This extends for a craniocaudal distance of 12 cm from the level of mid C2 to mid T1.  Intervertebral disc levels are as follows:    C2-C3 disc: No significant disc pathology.  Normal facet joints.  No spinal canal or neural foraminal stenosis.    C3-C4 disc: No significant disc pathology.  Normal facet joints.  No spinal canal or neural foraminal stenosis.    C4-C5 disc: Mild disc space height loss.  Mild uncovertebral joint degeneration bilaterally.  The thecal sac measures 11 mm AP.  Mild bilateral foraminal stenosis.    C5-C6 disc: Disc space height loss with moderate/severe uncovertebral joint degeneration and hypertrophy bilaterally.  There is severe bilateral foraminal stenosis.  The thecal sac measures 9 mm AP.    C6-C7 disc: Level of spondylodiscitis with severe bilateral foraminal stenosis and severe thecal sac stenosis flattened to 5 mm.  Slightly increased T2 signal within the cord.    C7-T1 disc: Normal disc space height and normal facet joints.  Mild bilateral foraminal stenosis relating to phlegmonous material.  The thecal sac measures 12 mm.                               CT Cervical Spine Without Contrast (Edited Result - FINAL)  Result time 07/17/19 12:41:39    Addendum 1 of 1 by Murray Bowman MD  (07/17/19 12:41:39)      Compression deformities are seen at C6 and C7.  Wedge compression fracture deformities are noted at C6 and C7.  Retropulsion at C6/7 producing narrowing of the central canal to 6-7 mm and moderate to severe left and mild-to-moderate right neural foraminal narrowing.      Electronically signed by: Murray Bowman MD  Date:    07/17/2019  Time:    12:41               Final result by Murray Bowman MD (07/15/19 15:16:27)                 Impression:      Compression deformities are seen at C6 and C4.  Wedge compression fracture deformities are noted at C6 and C4.  Retropulsion at C6/7 producing narrowing of the central canal to 6-7 mm and moderate to severe left and mild-to-moderate right neural foraminal narrowing.    COMMUNICATION  This critical result was discovered/received at 15:05.  The critical information above was relayed directly by me by telephone to Dr. Foote on 07/15/2019 at 15:07.    All CT scans at this facility use dose modulation, iterative reconstruction, and/or weight base dosing when appropriate to reduce radiation dose to as low as reasonably achievable.      Electronically signed by: Murray Bowman MD  Date:    07/15/2019  Time:    15:16             Narrative:    EXAMINATION:  CT CERVICAL SPINE WITHOUT CONTRAST    CLINICAL HISTORY:  Spine fracture, traumatic, cervical;    TECHNIQUE:  1.25 mm axial noncontrast CT images were obtained through the cervical spine using soft tissue and bony algorithm so.  Sagittal coronal reformats were also submitted for interpretation.    COMPARISON:  07/15/2019    FINDINGS:  Compression deformities are seen at C6 and C4.  Wedge compression fracture deformities are noted at C6 and C4 which appear significantly comminuted..  Retropulsion at C6/7 producing narrowing of the central canal to 6-7 mm. Reversal of the normal cervical lordosis with epicenter at C6/7.  Moderate to severe left and mild-to-moderate right neural foraminal narrowing.   Posterior elements appear intact.  The vertebral body heights are otherwise normal.  Intervertebral disc space height is maintained at the remaining levels.  Moderate prevertebral soft tissue swelling within the lower cervical region measuring up to 2.7 cm.    No significant degenerative changes.  Please note that routine CT of the spine is limited in its evaluation of extradural/epidural disease.  Lung apices are clear.

## 2019-07-20 NOTE — PROGRESS NOTES
Pharmacokinetic Assessment Follow Up: IV Vancomycin    Vancomycin serum concentration assessment(s):    The trough level was drawned correctly and can be used to guide therapy at this time.    Vancomycin Regimen Plan:    Continue regimen to Vancomycin 1250 mg IV every 8hour with next serum trough concentration measured at 03:30 prior to 3rd dose on 7/21     Pharmacy will continue to follow and monitor vancomycin.    Please contact pharmacy at extension [phone] for questions regarding this assessment.    Thank you for the consult,   Susie Gupta     Patient brief summary:  Alex Soares is a 28 y.o. male initiated on antimicrobial therapy with IV Vancomycin for treatment of suspected bone/joint    The patient received a loading dose, followed by the current treatment regimen: vancomycin 1250 mg IV every 8 hours    Drug Allergies:   Review of patient's allergies indicates:   Allergen Reactions    Ceclor [cefaclor] Hives    Cefixime Hives    Cefzil [cefprozil] Hives       Actual Body Weight:   66.1 kg    Renal Function:   Estimated Creatinine Clearance: 146.9 mL/min (based on SCr of 0.7 mg/dL).,     Dialysis Method (if applicable):  none     CBC (last 72 hours):  Recent Labs   Lab Result Units 07/18/19 0424 07/19/19 0424 07/20/19  0716   WBC K/uL 19.77* 13.81* 13.85*   Hemoglobin g/dL 12.1* 11.7* 13.1*   Hematocrit % 36.7* 35.3* 40.1   Platelets K/uL 386* 329 164   Gran% % 89.5* 85.2* 87.3*   Lymph% % 6.8* 10.2* 10.6*   Mono% % 3.9* 4.9 2.4*   Eosinophil% % 0.0 0.0 0.0   Basophil% % 0.1 0.0 0.1   Differential Method  Automated Automated Automated       Metabolic Panel (last 72 hours):  Recent Labs   Lab Result Units 07/18/19  0424 07/19/19 0424 07/20/19  0513   Sodium mmol/L 139 140 140   Potassium mmol/L 4.0 3.9 4.2   Chloride mmol/L 105 104 104   CO2 mmol/L 23 26 24   Glucose mg/dL 131* 128* 115*   BUN, Bld mg/dL 14 14 12   Creatinine mg/dL 0.7 0.7 0.7       Vancomycin Administrations:  vancomycin given in  the last 96 hours                     vancomycin (VANCOCIN) 1,250 mg in dextrose 5 % 250 mL IVPB (mg) 1,250 mg New Bag 07/20/19 1239     1,250 mg New Bag  0303     1,250 mg New Bag 07/19/19 1948    vancomycin 500 mg in dextrose 5 % 100 mL IVPB (ready to mix system) (mg) 500 mg New Bag 07/19/19 1204    vancomycin in dextrose 5 % 1 gram/250 mL IVPB 1,000 mg (mg) 1,000 mg New Bag 07/19/19 0837     1,000 mg New Bag 07/18/19 2347     1,000 mg New Bag  1352     1,000 mg New Bag  0633     1,000 mg New Bag 07/17/19 2318     1,000 mg New Bag  1419                      Drug levels (last 3 results):  Recent Labs   Lab Result Units 07/18/19  0424 07/19/19  0606 07/20/19  1029   Vancomycin-Trough ug/mL 14.3 5.0* 14.9       Microbiologic Results:  Microbiology Results (last 7 days)       Procedure Component Value Units Date/Time    Aerobic culture [[id number]]  (Abnormal)  (Susceptibility) Collected:  07/17/19 1044    Order Status:  Completed Specimen:  Bone from Vertebra Updated:  07/20/19 1312     Aerobic Bacterial Culture METHICILLIN RESISTANT STAPHYLOCOCCUS AUREUS  Many      Blood culture [[id number]] Collected:  07/15/19 1600    Order Status:  Completed Specimen:  Blood from Peripheral, Upper Arm, Left Updated:  07/20/19 0612     Blood Culture, Routine No Growth to date      No Growth to date      No Growth to date      No Growth to date      No Growth to date    Blood culture [[id number]] Collected:  07/15/19 1619    Order Status:  Completed Specimen:  Blood from Peripheral, Upper Arm, Left Updated:  07/20/19 0612     Blood Culture, Routine No Growth to date      No Growth to date      No Growth to date      No Growth to date      No Growth to date    Culture, Anaerobe [[id number]] Collected:  07/17/19 1044    Order Status:  Completed Specimen:  Bone from Vertebra Updated:  07/19/19 1122     Anaerobic Culture Culture in progress    AFB Culture & Smear [[id number]] Collected:  07/17/19 1044    Order Status:  Completed Specimen:   Bone from Vertebra Updated:  07/19/19 0927     AFB Culture & Smear Culture in progress     AFB CULTURE STAIN No acid fast bacilli seen.    Gram stain [[id number]] Collected:  07/17/19 1044    Order Status:  Completed Specimen:  Bone from Vertebra Updated:  07/17/19 2222     Gram Stain Result Rare WBC's      No organisms seen    Fungus culture [[id number]] Collected:  07/17/19 1044    Order Status:  Sent Specimen:  Bone from Vertebra Updated:  07/17/19 2015    Gram stain [[id number]]     Order Status:  Canceled Specimen:  Tissue     Culture, Anaerobic [[id number]]     Order Status:  Canceled Specimen:  Tissue     AFB Culture & Smear [[id number]]     Order Status:  Canceled Specimen:  Tissue     Fungus culture [[id number]]     Order Status:  Canceled Specimen:  Tissue     Tissue culture [[id number]]     Order Status:  Canceled Specimen:  Tissue

## 2019-07-20 NOTE — ASSESSMENT & PLAN NOTE
Neurosurgery consulted by ED and is aware patient's case.  Patient does have some mild Neuro impairment with unsteady gait and generalized weakness all extremities on my exam and I have notified Neuro surgery at this time.  Awaiting their response/recommendations.  Patient was started on vancomycin in ED will increase coverage with meropenem.  Given his allergy and findings of osteo on MRI.  Consider addition of nafcillin to regimen pending course.    Will consult ID  Neuro checks  Further evaluation/diagnostics/interventions/consults pending course     07/16- will continue vancomycin / and use Aztreonam -will use cultures to guide therapy     7/18 - ID following,  s/p anterior cervical decompression and fusion, POD # 1, continue Vanco and Aztreonam, will use cultures to guide therapy.     7/19 - ID following,  s/p anterior cervical decompression and fusion, POD # 2, continue Vanco and Meropenem, use cultures to guide therapy.     7/20- ID following, continue Vancomycin IV for now, awaiting further recommendations from ID.

## 2019-07-20 NOTE — PROGRESS NOTES
[facility] Hospital Medicine  Progress Note    Patient Name: Alex Soares  MRN: [id number]  Patient Class: IP- Inpatient   Admission Date: 7/15/2019  Length of Stay: 5 days  Attending Physician: Elayne Akhtar MD  Primary Care Provider: Simi Macdonald MD        Subjective:     Principal Problem:Cervical spinal cord compression      HPI:  28-year-old male with history of IV drug use, last use of heroin about 2 weeks ago, and tattoos, last recent tattoo 4 weeks ago; presents today as a transfer from outside freestanding ER for neck pain. Patient reports he has been having neck pain over the past month and has been seen outpatient but has not improved.  Patient reports playing with the dog earlier which exacerbated his pain and why he presented to the emergency room.  Patient was evaluated in the ER MRI performed and shows a cervical C6-7 with surrounding vertebral body osteomyelitis. Neurosurgery was consulted by ER.  Vancomycin was started.  Hospital Medicine consulted patient admitted.  Patient seen and examined on arrival.  Cervical collar in place on arrival.  Patient with allergies to cephalosporin reports hives in past.  Patient with increased risk of MRSA/MSSA will cover with both vancomycin and meropenem (given patient allergy).    Overview/Hospital Course:  28-year-old male with history of IV drug use, last use of heroin - a day prior to presentation .  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  No definite cord edema at this time.  Postcontrast imaging may be obtained as needed.  2. Vertebral body osteomyelitis of C5, C6, C7, and T1.  3. Anterior paraspinal phlegmon/abscess extending from mid C2 to mid T1  07/17 -s/p Cervical spine decompression.   As of 7/18/2019, patient seen and examined, s/p anterior cervical decompression and fusion, POD # 1 by Dr Jeong.  Patient  reports feeling well, moving all extremities, cervical collar intact. Vitals signs and labs stable. ID following, awaiting culture results from surgery.   As of 7/19, patient seen and examined, s/p anterior cervical decompression and fusion, POD # 2. Patient feeling well, ambulating in clements without difficult. ID following, continue Vancomycin and Meronem. Awaiting cultures to guide therapy.    As of 7/20, s/p s/p anterior cervical decompression and fusion, POD # 3. ID following, plan on 6-8 weeks of antibiotics preferably IV therapy. Awaiting further recommendations.         Interval History: Pt seen and examined. No distress. Vital signs and labs stable. ID following, awaiting further recommends on 6-8 weeks of antibiotic.      Review of Systems   Constitutional: Negative for chills, diaphoresis, fatigue and fever.   HENT: Negative for congestion, ear discharge, rhinorrhea, sinus pressure, sore throat and trouble swallowing.    Eyes: Negative for discharge and visual disturbance.   Respiratory: Negative for apnea, cough, choking, chest tightness, shortness of breath, wheezing and stridor.    Cardiovascular: Negative for chest pain, palpitations and leg swelling.   Gastrointestinal: Negative for abdominal distention, abdominal pain, diarrhea, nausea and vomiting.   Endocrine: Negative for cold intolerance and heat intolerance.   Genitourinary: Negative for dysuria, frequency and hematuria.   Musculoskeletal: Negative for arthralgias, back pain, myalgias and neck pain.   Skin: Negative for pallor and rash.   Neurological: Negative for dizziness, seizures, syncope, weakness and headaches.   Psychiatric/Behavioral: Negative for agitation, confusion and sleep disturbance.     Objective:     Vital Signs (Most Recent):  Temp: 98.1 °F (36.7 °C) (07/20/19 1151)  Pulse: 82 (07/20/19 1151)  Resp: 17 (07/20/19 1151)  BP: 132/76 (07/20/19 1151)  SpO2: 97 % (07/20/19 1151) Vital Signs (24h Range):  Temp:  [97.6 °F (36.4 °C)-98.4  °F (36.9 °C)] 98.1 °F (36.7 °C)  Pulse:  [63-87] 82  Resp:  [16-18] 17  SpO2:  [96 %-98 %] 97 %  BP: (132-135)/(76-85) 132/76     Weight: 66.1 kg (145 lb 11.6 oz)  Body mass index is 22.09 kg/m².    Intake/Output Summary (Last 24 hours) at 7/20/2019 1412  Last data filed at 7/20/2019 0534  Gross per 24 hour   Intake 860 ml   Output --   Net 860 ml      Physical Exam   Constitutional: He is oriented to person, place, and time. No distress. Cervical collar in place.   HENT:   Mouth/Throat: No oropharyngeal exudate.   Neck incision intact, no drainage.    Eyes: Right eye exhibits no discharge. Left eye exhibits no discharge.   Neck: No JVD present.   Cardiovascular: Exam reveals no gallop and no friction rub.   No murmur heard.  Pulmonary/Chest: No stridor. No respiratory distress. He has no wheezes. He has no rales. He exhibits no tenderness.   Abdominal: He exhibits no distension and no mass. There is no tenderness. There is no rebound and no guarding. No hernia.   Musculoskeletal: He exhibits no edema or deformity.   Neurological: He is alert and oriented to person, place, and time.   Skin: Skin is warm and dry. Capillary refill takes less than 2 seconds. He is not diaphoretic.   Nursing note and vitals reviewed.      Significant Labs:   CBC:   Recent Labs   Lab 07/19/19  0424 07/20/19  0716   WBC 13.81* 13.85*   HGB 11.7* 13.1*   HCT 35.3* 40.1    164     CMP:   Recent Labs   Lab 07/19/19  0424 07/20/19  0513    140   K 3.9 4.2    104   CO2 26 24   * 115*   BUN 14 12   CREATININE 0.7 0.7   CALCIUM 9.1 9.4   ANIONGAP 10 12   EGFRNONAA >60 >60       Significant Imaging:     Imaging Results          MRI Cervical Spine Without Contrast (Final result)  Result time 07/15/19 15:59:07    Final result by Paco Merritt Jr., MD (07/15/19 15:59:07)                 Impression:      1. 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  No definite cord edema at this time.  Postcontrast imaging may be obtained as needed.  2. Vertebral body osteomyelitis of C5, C6, C7, and T1.  3. Anterior paraspinal phlegmon/abscess extending from mid C2 to mid T1.  4. Foraminal stenosis at C4-C5, C5-C6, and C6-C7.      Electronically signed by: Paco Merritt Jr., MD  Date:    07/15/2019  Time:    15:59             Narrative:    EXAMINATION:  MRI CERVICAL SPINE WITHOUT CONTRAST    CLINICAL HISTORY:  Spine fracture, traumatic, cervical;    TECHNIQUE:  MR Cervical spine without contrast. Sagittal T1, T2, STIR. Axial 3D, T2. Coronal T1.    COMPARISON:  None available.    FINDINGS:  There is erosive endplate change, complete disc space height loss, and wedge deformity of the C6 and C7 vertebral bodies with abnormal marrow signal most consistent with spondylodiscitis.  There is also abnormal marrow signal within the bodies of C5 and T1.  There is disc space abscess/phlegmon/retropulsion relating to wedge compression deformity at C6-C7 that posteriorly protrudes and flattens the thecal sac to 5 mm AP.  The ligamentum flavum/interspinous ligaments at C5-C6 are stretched taut posteriorly but are intact.  There is extensive anterior paraspinal swelling/edema possibly with central loculated abscess collections.  This extends for a craniocaudal distance of 12 cm from the level of mid C2 to mid T1.  Intervertebral disc levels are as follows:    C2-C3 disc: No significant disc pathology.  Normal facet joints.  No spinal canal or neural foraminal stenosis.    C3-C4 disc: No significant disc pathology.  Normal facet joints.  No spinal canal or neural foraminal stenosis.    C4-C5 disc: Mild disc space height loss.  Mild uncovertebral joint degeneration bilaterally.  The thecal sac measures 11 mm AP.  Mild bilateral foraminal stenosis.    C5-C6 disc: Disc space height loss with moderate/severe uncovertebral joint degeneration and hypertrophy bilaterally.  There is  severe bilateral foraminal stenosis.  The thecal sac measures 9 mm AP.    C6-C7 disc: Level of spondylodiscitis with severe bilateral foraminal stenosis and severe thecal sac stenosis flattened to 5 mm.  Slightly increased T2 signal within the cord.    C7-T1 disc: Normal disc space height and normal facet joints.  Mild bilateral foraminal stenosis relating to phlegmonous material.  The thecal sac measures 12 mm.                               CT Cervical Spine Without Contrast (Edited Result - FINAL)  Result time 07/17/19 12:41:39    Addendum 1 of 1 by Murray Bowman MD (07/17/19 12:41:39)      Compression deformities are seen at C6 and C7.  Wedge compression fracture deformities are noted at C6 and C7.  Retropulsion at C6/7 producing narrowing of the central canal to 6-7 mm and moderate to severe left and mild-to-moderate right neural foraminal narrowing.      Electronically signed by: Murray Bowman MD  Date:    07/17/2019  Time:    12:41               Final result by Murary Bowman MD (07/15/19 15:16:27)                 Impression:      Compression deformities are seen at C6 and C4.  Wedge compression fracture deformities are noted at C6 and C4.  Retropulsion at C6/7 producing narrowing of the central canal to 6-7 mm and moderate to severe left and mild-to-moderate right neural foraminal narrowing.    COMMUNICATION  This critical result was discovered/received at 15:05.  The critical information above was relayed directly by me by telephone to Dr. Foote on 07/15/2019 at 15:07.    All CT scans at this facility use dose modulation, iterative reconstruction, and/or weight base dosing when appropriate to reduce radiation dose to as low as reasonably achievable.      Electronically signed by: Murray Bowman MD  Date:    07/15/2019  Time:    15:16             Narrative:    EXAMINATION:  CT CERVICAL SPINE WITHOUT CONTRAST    CLINICAL HISTORY:  Spine fracture, traumatic, cervical;    TECHNIQUE:  1.25 mm axial  noncontrast CT images were obtained through the cervical spine using soft tissue and bony algorithm so.  Sagittal coronal reformats were also submitted for interpretation.    COMPARISON:  07/15/2019    FINDINGS:  Compression deformities are seen at C6 and C4.  Wedge compression fracture deformities are noted at C6 and C4 which appear significantly comminuted..  Retropulsion at C6/7 producing narrowing of the central canal to 6-7 mm. Reversal of the normal cervical lordosis with epicenter at C6/7.  Moderate to severe left and mild-to-moderate right neural foraminal narrowing.  Posterior elements appear intact.  The vertebral body heights are otherwise normal.  Intervertebral disc space height is maintained at the remaining levels.  Moderate prevertebral soft tissue swelling within the lower cervical region measuring up to 2.7 cm.    No significant degenerative changes.  Please note that routine CT of the spine is limited in its evaluation of extradural/epidural disease.  Lung apices are clear.                                Assessment/Plan:      * Cervical spinal cord compression  S/P cervical spine decompression       Abscess in epidural space of cervical spine    07/17- Incision and drainage   ID following, plan on 6-8 weeks of antibiotic therapy,  waiting further recommendation from ID. Continue Vancomycin IV for now.    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  Patient does have some mild Neuro impairment with unsteady gait and generalized weakness all extremities on my exam and I have notified Neuro surgery at this time.  Awaiting their response/recommendations.  Patient was started on vancomycin in ED will increase coverage with meropenem.  Given his allergy and findings of osteo on MRI.  Consider addition of nafcillin to regimen pending course.    Will consult ID  Neuro checks  Further  evaluation/diagnostics/interventions/consults pending course     07/16- will continue vancomycin / and use Aztreonam -will use cultures to guide therapy     7/18 - ID following,  s/p anterior cervical decompression and fusion, POD # 1, continue Vanco and Aztreonam, will use cultures to guide therapy.     7/19 - ID following,  s/p anterior cervical decompression and fusion, POD # 2, continue Vanco and Meropenem, use cultures to guide therapy.     7/20- ID following, continue Vancomycin IV for now, awaiting further recommendations from ID.       Sepsis  Related to above.   Continue vanc and meropenem  Id consult  Neurosurgery following   Further evaluation/diagnostics/interventions/consults pending course         Elevated BP without diagnosis of hypertension  Monitor consider daily therapy pending course  Further evaluation/diagnostics/interventions/consults pending course         Hypokalemia  borderline  Monitor and supplement as need      IV drug user  Vancomycin and meropenem.  Consider addition of nafcillin pending course.  ID following   Echo complete   The patient was counseled on the dangers of use and encouraged to quit.  Further evaluation/diagnostics/interventions/consults pending course     07/16- needs out patient drug cessation counseling     Spondylodiscitis  Vanc/meropenum  Neurosurgery managing.   Cervical collar inplace  Further evaluation/diagnostics/interventions/consults pending course               VTE Risk Mitigation (From admission, onward)        Ordered     IP VTE HIGH RISK PATIENT  Once      07/17/19 1247     Place sequential compression device  Until discontinued      07/17/19 1247                Kevon Leger NP  Department of Hospital Medicine   [facility]

## 2019-07-20 NOTE — ASSESSMENT & PLAN NOTE
Vancomycin and meropenem.  Consider addition of nafcillin pending course.  ID following   Echo complete   The patient was counseled on the dangers of use and encouraged to quit.  Further evaluation/diagnostics/interventions/consults pending course     07/16- needs out patient drug cessation counseling

## 2019-07-20 NOTE — PLAN OF CARE
Problem: Adult Inpatient Plan of Care  Goal: Plan of Care Review  Outcome: Ongoing (interventions implemented as appropriate)  Remains free from injury. States relief of pain with available prn meds.Abx administered as ordered. C-collar in place at all times. Vital signs stable. Chart reviewed. Will continue to monitor.

## 2019-07-20 NOTE — SUBJECTIVE & OBJECTIVE
Interval History: Pt seen and examined. Vital signs and lab stable. ID following.  Awaiting cultures to guide therapy.     Review of Systems   Constitutional: Negative for chills, diaphoresis, fatigue and fever.   HENT: Negative for congestion, ear discharge, rhinorrhea, sinus pressure, sore throat and trouble swallowing.    Eyes: Negative for discharge and visual disturbance.   Respiratory: Negative for apnea, cough, choking, chest tightness, shortness of breath, wheezing and stridor.    Cardiovascular: Negative for chest pain, palpitations and leg swelling.   Gastrointestinal: Negative for abdominal distention, abdominal pain, diarrhea, nausea and vomiting.   Endocrine: Negative for cold intolerance and heat intolerance.   Genitourinary: Negative for dysuria, frequency and hematuria.   Musculoskeletal: Negative for arthralgias, back pain, myalgias and neck pain.   Skin: Negative for pallor and rash.   Neurological: Negative for dizziness, seizures, syncope, weakness and headaches.   Psychiatric/Behavioral: Negative for agitation, confusion and sleep disturbance.     Objective:     Vital Signs (Most Recent):  Temp: 98.4 °F (36.9 °C) (07/19/19 1553)  Pulse: 71 (07/19/19 1553)  Resp: 18 (07/19/19 1553)  BP: 132/81 (07/19/19 1553)  SpO2: 97 % (07/19/19 1553) Vital Signs (24h Range):  Temp:  [97.6 °F (36.4 °C)-98.4 °F (36.9 °C)] 98.4 °F (36.9 °C)  Pulse:  [71-90] 71  Resp:  [12-18] 18  SpO2:  [96 %-97 %] 97 %  BP: (125-133)/(75-81) 132/81     Weight: 66.1 kg (145 lb 11.6 oz)  Body mass index is 22.09 kg/m².    Intake/Output Summary (Last 24 hours) at 7/19/2019 1902  Last data filed at 7/19/2019 1328  Gross per 24 hour   Intake 1460 ml   Output --   Net 1460 ml      Physical Exam   Constitutional: He is oriented to person, place, and time. No distress. Cervical collar in place.   HENT:   Mouth/Throat: No oropharyngeal exudate.   Neck incision intact    Eyes: Right eye exhibits no discharge. Left eye exhibits no  discharge.   Neck: No JVD present.   Cardiovascular: Exam reveals no gallop and no friction rub.   No murmur heard.  Pulmonary/Chest: No stridor. No respiratory distress. He has no wheezes. He has no rales. He exhibits no tenderness.   Abdominal: He exhibits no distension and no mass. There is no tenderness. There is no rebound and no guarding. No hernia.   Musculoskeletal: He exhibits no edema or deformity.   Neurological: He is alert and oriented to person, place, and time.   Skin: Skin is warm and dry. Capillary refill takes less than 2 seconds. He is not diaphoretic.   Nursing note and vitals reviewed.      Significant Labs:   CBC:   Recent Labs   Lab 07/18/19 0424 07/19/19 0424   WBC 19.77* 13.81*   HGB 12.1* 11.7*   HCT 36.7* 35.3*   * 329     CMP:   Recent Labs   Lab 07/18/19 0424 07/19/19 0424    140   K 4.0 3.9    104   CO2 23 26   * 128*   BUN 14 14   CREATININE 0.7 0.7   CALCIUM 9.0 9.1   ANIONGAP 11 10   EGFRNONAA >60 >60       Significant Imaging:     Imaging Results          MRI Cervical Spine Without Contrast (Final result)  Result time 07/15/19 15:59:07    Final result by Paco Merritt Jr., MD (07/15/19 15:59:07)                 Impression:      1. 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  No definite cord edema at this time.  Postcontrast imaging may be obtained as needed.  2. Vertebral body osteomyelitis of C5, C6, C7, and T1.  3. Anterior paraspinal phlegmon/abscess extending from mid C2 to mid T1.  4. Foraminal stenosis at C4-C5, C5-C6, and C6-C7.      Electronically signed by: Paco Merritt Jr., MD  Date:    07/15/2019  Time:    15:59             Narrative:    EXAMINATION:  MRI CERVICAL SPINE WITHOUT CONTRAST    CLINICAL HISTORY:  Spine fracture, traumatic, cervical;    TECHNIQUE:  MR Cervical spine without contrast. Sagittal T1, T2, STIR. Axial 3D, T2. Coronal  T1.    COMPARISON:  None available.    FINDINGS:  There is erosive endplate change, complete disc space height loss, and wedge deformity of the C6 and C7 vertebral bodies with abnormal marrow signal most consistent with spondylodiscitis.  There is also abnormal marrow signal within the bodies of C5 and T1.  There is disc space abscess/phlegmon/retropulsion relating to wedge compression deformity at C6-C7 that posteriorly protrudes and flattens the thecal sac to 5 mm AP.  The ligamentum flavum/interspinous ligaments at C5-C6 are stretched taut posteriorly but are intact.  There is extensive anterior paraspinal swelling/edema possibly with central loculated abscess collections.  This extends for a craniocaudal distance of 12 cm from the level of mid C2 to mid T1.  Intervertebral disc levels are as follows:    C2-C3 disc: No significant disc pathology.  Normal facet joints.  No spinal canal or neural foraminal stenosis.    C3-C4 disc: No significant disc pathology.  Normal facet joints.  No spinal canal or neural foraminal stenosis.    C4-C5 disc: Mild disc space height loss.  Mild uncovertebral joint degeneration bilaterally.  The thecal sac measures 11 mm AP.  Mild bilateral foraminal stenosis.    C5-C6 disc: Disc space height loss with moderate/severe uncovertebral joint degeneration and hypertrophy bilaterally.  There is severe bilateral foraminal stenosis.  The thecal sac measures 9 mm AP.    C6-C7 disc: Level of spondylodiscitis with severe bilateral foraminal stenosis and severe thecal sac stenosis flattened to 5 mm.  Slightly increased T2 signal within the cord.    C7-T1 disc: Normal disc space height and normal facet joints.  Mild bilateral foraminal stenosis relating to phlegmonous material.  The thecal sac measures 12 mm.                               CT Cervical Spine Without Contrast (Edited Result - FINAL)  Result time 07/17/19 12:41:39    Addendum 1 of 1 by Murray Bowman MD (07/17/19 12:41:39)       Compression deformities are seen at C6 and C7.  Wedge compression fracture deformities are noted at C6 and C7.  Retropulsion at C6/7 producing narrowing of the central canal to 6-7 mm and moderate to severe left and mild-to-moderate right neural foraminal narrowing.      Electronically signed by: Murray Bowman MD  Date:    07/17/2019  Time:    12:41               Final result by Murray Bowman MD (07/15/19 15:16:27)                 Impression:      Compression deformities are seen at C6 and C4.  Wedge compression fracture deformities are noted at C6 and C4.  Retropulsion at C6/7 producing narrowing of the central canal to 6-7 mm and moderate to severe left and mild-to-moderate right neural foraminal narrowing.    COMMUNICATION  This critical result was discovered/received at 15:05.  The critical information above was relayed directly by me by telephone to Dr. Foote on 07/15/2019 at 15:07.    All CT scans at this facility use dose modulation, iterative reconstruction, and/or weight base dosing when appropriate to reduce radiation dose to as low as reasonably achievable.      Electronically signed by: Murray Bowman MD  Date:    07/15/2019  Time:    15:16             Narrative:    EXAMINATION:  CT CERVICAL SPINE WITHOUT CONTRAST    CLINICAL HISTORY:  Spine fracture, traumatic, cervical;    TECHNIQUE:  1.25 mm axial noncontrast CT images were obtained through the cervical spine using soft tissue and bony algorithm so.  Sagittal coronal reformats were also submitted for interpretation.    COMPARISON:  07/15/2019    FINDINGS:  Compression deformities are seen at C6 and C4.  Wedge compression fracture deformities are noted at C6 and C4 which appear significantly comminuted..  Retropulsion at C6/7 producing narrowing of the central canal to 6-7 mm. Reversal of the normal cervical lordosis with epicenter at C6/7.  Moderate to severe left and mild-to-moderate right neural foraminal narrowing.  Posterior elements appear  intact.  The vertebral body heights are otherwise normal.  Intervertebral disc space height is maintained at the remaining levels.  Moderate prevertebral soft tissue swelling within the lower cervical region measuring up to 2.7 cm.    No significant degenerative changes.  Please note that routine CT of the spine is limited in its evaluation of extradural/epidural disease.  Lung apices are clear.

## 2019-07-20 NOTE — PLAN OF CARE
Problem: Physical Therapy Goal  Goal: Physical Therapy Goal  PT WILL BE SEEN FOR P.T. FOR A MIN OF 5 OUT OF 7 DAYS A WEEK  LT19  1. PT WILL COMPLETE BED MOBILITY IND  2. PT WILL T/F TO CHAIR IND  3. PT WILL GT TRAIN X 500' IND   4. PT WILL COMPLETE B LE TE X 20 REPS     Outcome: Ongoing (interventions implemented as appropriate)  PATIENT MOTIVATED AND REDY FOR D/C. PATIENT MAY BENEFIT FROM PEOPLE [facility] PROGRAM. WILL NOT GET SEEN TOMORROW DUE TO PATIENT IS TOO HIGH LEVEL FOR SKILLED CARE.

## 2019-07-20 NOTE — PROGRESS NOTES
[facility] Hospital Medicine  Progress Note    Patient Name: Alex Soares  MRN: [id number]  Patient Class: IP- Inpatient   Admission Date: 7/15/2019  Length of Stay: 4 days  Attending Physician: Elayne Akhtar MD  Primary Care Provider: Simi Macdonald MD        Subjective:     Principal Problem:Cervical spinal cord compression      HPI:  28-year-old male with history of IV drug use, last use of heroin about 2 weeks ago, and tattoos, last recent tattoo 4 weeks ago; presents today as a transfer from outside freestanding ER for neck pain. Patient reports he has been having neck pain over the past month and has been seen outpatient but has not improved.  Patient reports playing with the dog earlier which exacerbated his pain and why he presented to the emergency room.  Patient was evaluated in the ER MRI performed and shows a cervical C6-7 with surrounding vertebral body osteomyelitis. Neurosurgery was consulted by ER.  Vancomycin was started.  Hospital Medicine consulted patient admitted.  Patient seen and examined on arrival.  Cervical collar in place on arrival.  Patient with allergies to cephalosporin reports hives in past.  Patient with increased risk of MRSA/MSSA will cover with both vancomycin and meropenem (given patient allergy).    Overview/Hospital Course:  28-year-old male with history of IV drug use, last use of heroin - a day prior to presentation .  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  No definite cord edema at this time.  Postcontrast imaging may be obtained as needed.  2. Vertebral body osteomyelitis of C5, C6, C7, and T1.  3. Anterior paraspinal phlegmon/abscess extending from mid C2 to mid T1  07/17 -s/p Cervical spine decompression.   As of 7/18/2019, patient seen and examined, s/p anterior cervical decompression and fusion, POD # 1 by Dr Jeong.  Patient  reports feeling well, moving all extremities, cervical collar intact. Vitals signs and labs stable. ID following, awaiting culture results from surgery.   As of 7/19, patient seen and examined, s/p anterior cervical decompression and fusion, POD # 2. Patient feeling well, ambulating in clements without difficult. ID following, continue Vancomycin and Meronem. Awaiting cultures to guide therapy.          Interval History: Pt seen and examined. Vital signs and lab stable. ID following.  Awaiting cultures to guide therapy.     Review of Systems   Constitutional: Negative for chills, diaphoresis, fatigue and fever.   HENT: Negative for congestion, ear discharge, rhinorrhea, sinus pressure, sore throat and trouble swallowing.    Eyes: Negative for discharge and visual disturbance.   Respiratory: Negative for apnea, cough, choking, chest tightness, shortness of breath, wheezing and stridor.    Cardiovascular: Negative for chest pain, palpitations and leg swelling.   Gastrointestinal: Negative for abdominal distention, abdominal pain, diarrhea, nausea and vomiting.   Endocrine: Negative for cold intolerance and heat intolerance.   Genitourinary: Negative for dysuria, frequency and hematuria.   Musculoskeletal: Negative for arthralgias, back pain, myalgias and neck pain.   Skin: Negative for pallor and rash.   Neurological: Negative for dizziness, seizures, syncope, weakness and headaches.   Psychiatric/Behavioral: Negative for agitation, confusion and sleep disturbance.     Objective:     Vital Signs (Most Recent):  Temp: 98.4 °F (36.9 °C) (07/19/19 1553)  Pulse: 71 (07/19/19 1553)  Resp: 18 (07/19/19 1553)  BP: 132/81 (07/19/19 1553)  SpO2: 97 % (07/19/19 1553) Vital Signs (24h Range):  Temp:  [97.6 °F (36.4 °C)-98.4 °F (36.9 °C)] 98.4 °F (36.9 °C)  Pulse:  [71-90] 71  Resp:  [12-18] 18  SpO2:  [96 %-97 %] 97 %  BP: (125-133)/(75-81) 132/81     Weight: 66.1 kg (145 lb 11.6 oz)  Body mass index is 22.09 kg/m².    Intake/Output  Summary (Last 24 hours) at 7/19/2019 1902  Last data filed at 7/19/2019 1328  Gross per 24 hour   Intake 1460 ml   Output --   Net 1460 ml      Physical Exam   Constitutional: He is oriented to person, place, and time. No distress. Cervical collar in place.   HENT:   Mouth/Throat: No oropharyngeal exudate.   Neck incision intact    Eyes: Right eye exhibits no discharge. Left eye exhibits no discharge.   Neck: No JVD present.   Cardiovascular: Exam reveals no gallop and no friction rub.   No murmur heard.  Pulmonary/Chest: No stridor. No respiratory distress. He has no wheezes. He has no rales. He exhibits no tenderness.   Abdominal: He exhibits no distension and no mass. There is no tenderness. There is no rebound and no guarding. No hernia.   Musculoskeletal: He exhibits no edema or deformity.   Neurological: He is alert and oriented to person, place, and time.   Skin: Skin is warm and dry. Capillary refill takes less than 2 seconds. He is not diaphoretic.   Nursing note and vitals reviewed.      Significant Labs:   CBC:   Recent Labs   Lab 07/18/19 0424 07/19/19 0424   WBC 19.77* 13.81*   HGB 12.1* 11.7*   HCT 36.7* 35.3*   * 329     CMP:   Recent Labs   Lab 07/18/19 0424 07/19/19 0424    140   K 4.0 3.9    104   CO2 23 26   * 128*   BUN 14 14   CREATININE 0.7 0.7   CALCIUM 9.0 9.1   ANIONGAP 11 10   EGFRNONAA >60 >60       Significant Imaging:     Imaging Results          MRI Cervical Spine Without Contrast (Final result)  Result time 07/15/19 15:59:07    Final result by Paco Merritt Jr., MD (07/15/19 15:59:07)                 Impression:      1. 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  No definite cord edema at this time.  Postcontrast imaging may be obtained as needed.  2. Vertebral body osteomyelitis of C5, C6, C7, and T1.  3. Anterior paraspinal phlegmon/abscess extending from mid C2 to mid  T1.  4. Foraminal stenosis at C4-C5, C5-C6, and C6-C7.      Electronically signed by: Paco Merritt Jr., MD  Date:    07/15/2019  Time:    15:59             Narrative:    EXAMINATION:  MRI CERVICAL SPINE WITHOUT CONTRAST    CLINICAL HISTORY:  Spine fracture, traumatic, cervical;    TECHNIQUE:  MR Cervical spine without contrast. Sagittal T1, T2, STIR. Axial 3D, T2. Coronal T1.    COMPARISON:  None available.    FINDINGS:  There is erosive endplate change, complete disc space height loss, and wedge deformity of the C6 and C7 vertebral bodies with abnormal marrow signal most consistent with spondylodiscitis.  There is also abnormal marrow signal within the bodies of C5 and T1.  There is disc space abscess/phlegmon/retropulsion relating to wedge compression deformity at C6-C7 that posteriorly protrudes and flattens the thecal sac to 5 mm AP.  The ligamentum flavum/interspinous ligaments at C5-C6 are stretched taut posteriorly but are intact.  There is extensive anterior paraspinal swelling/edema possibly with central loculated abscess collections.  This extends for a craniocaudal distance of 12 cm from the level of mid C2 to mid T1.  Intervertebral disc levels are as follows:    C2-C3 disc: No significant disc pathology.  Normal facet joints.  No spinal canal or neural foraminal stenosis.    C3-C4 disc: No significant disc pathology.  Normal facet joints.  No spinal canal or neural foraminal stenosis.    C4-C5 disc: Mild disc space height loss.  Mild uncovertebral joint degeneration bilaterally.  The thecal sac measures 11 mm AP.  Mild bilateral foraminal stenosis.    C5-C6 disc: Disc space height loss with moderate/severe uncovertebral joint degeneration and hypertrophy bilaterally.  There is severe bilateral foraminal stenosis.  The thecal sac measures 9 mm AP.    C6-C7 disc: Level of spondylodiscitis with severe bilateral foraminal stenosis and severe thecal sac stenosis flattened to 5 mm.  Slightly increased T2  signal within the cord.    C7-T1 disc: Normal disc space height and normal facet joints.  Mild bilateral foraminal stenosis relating to phlegmonous material.  The thecal sac measures 12 mm.                               CT Cervical Spine Without Contrast (Edited Result - FINAL)  Result time 07/17/19 12:41:39    Addendum 1 of 1 by Murray Bowman MD (07/17/19 12:41:39)      Compression deformities are seen at C6 and C7.  Wedge compression fracture deformities are noted at C6 and C7.  Retropulsion at C6/7 producing narrowing of the central canal to 6-7 mm and moderate to severe left and mild-to-moderate right neural foraminal narrowing.      Electronically signed by: Murray Bowman MD  Date:    07/17/2019  Time:    12:41               Final result by Murray Bowman MD (07/15/19 15:16:27)                 Impression:      Compression deformities are seen at C6 and C4.  Wedge compression fracture deformities are noted at C6 and C4.  Retropulsion at C6/7 producing narrowing of the central canal to 6-7 mm and moderate to severe left and mild-to-moderate right neural foraminal narrowing.    COMMUNICATION  This critical result was discovered/received at 15:05.  The critical information above was relayed directly by me by telephone to Dr. Foote on 07/15/2019 at 15:07.    All CT scans at this facility use dose modulation, iterative reconstruction, and/or weight base dosing when appropriate to reduce radiation dose to as low as reasonably achievable.      Electronically signed by: Murray Bowman MD  Date:    07/15/2019  Time:    15:16             Narrative:    EXAMINATION:  CT CERVICAL SPINE WITHOUT CONTRAST    CLINICAL HISTORY:  Spine fracture, traumatic, cervical;    TECHNIQUE:  1.25 mm axial noncontrast CT images were obtained through the cervical spine using soft tissue and bony algorithm so.  Sagittal coronal reformats were also submitted for interpretation.    COMPARISON:  07/15/2019    FINDINGS:  Compression  deformities are seen at C6 and C4.  Wedge compression fracture deformities are noted at C6 and C4 which appear significantly comminuted..  Retropulsion at C6/7 producing narrowing of the central canal to 6-7 mm. Reversal of the normal cervical lordosis with epicenter at C6/7.  Moderate to severe left and mild-to-moderate right neural foraminal narrowing.  Posterior elements appear intact.  The vertebral body heights are otherwise normal.  Intervertebral disc space height is maintained at the remaining levels.  Moderate prevertebral soft tissue swelling within the lower cervical region measuring up to 2.7 cm.    No significant degenerative changes.  Please note that routine CT of the spine is limited in its evaluation of extradural/epidural disease.  Lung apices are clear.                                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  Patient does have some mild Neuro impairment with unsteady gait and generalized weakness all extremities on my exam and I have notified Neuro surgery at this time.  Awaiting their response/recommendations.  Patient was started on vancomycin in ED will increase coverage with meropenem.  Given his allergy and findings of osteo on MRI.  Consider addition of nafcillin to regimen pending course.    Will consult ID  Neuro checks  Further evaluation/diagnostics/interventions/consults pending course     07/16- will continue vancomycin / and use Aztreonam -will use cultures to guide therapy     7/18 - ID following,  s/p anterior cervical decompression and fusion, POD # 1, continue Vanco and Aztreonam, will use cultures to guide therapy.     7/19 - ID following,  s/p anterior cervical decompression and  fusion, POD # 2, continue Vanco and Meropenem, use cultures to guide therapy.       Sepsis  Related to above.   Continue vanc and meropenem  Id consult  Neurosurgery following   Further evaluation/diagnostics/interventions/consults pending course         Elevated BP without diagnosis of hypertension  Monitor consider daily therapy pending course  Further evaluation/diagnostics/interventions/consults pending course         Hypokalemia  borderline  Monitor and supplement as need      IV drug user  Vancomycin and meropenem.  Consider addition of nafcillin pending course.  ID following   Echo complete   The patient was counseled on the dangers of use and encouraged to quit.  Further evaluation/diagnostics/interventions/consults pending course     07/16- needs out patient drug cessation counseling     Spondylodiscitis  Vanc/meropenum  Neurosurgery managing.   Cervical collar inplace  Further evaluation/diagnostics/interventions/consults pending course               VTE Risk Mitigation (From admission, onward)        Ordered     IP VTE HIGH RISK PATIENT  Once      07/17/19 1247     Place sequential compression device  Until discontinued      07/17/19 1247                Kevon Leger NP  Department of Hospital Medicine   [facility]

## 2019-07-20 NOTE — ASSESSMENT & PLAN NOTE
Related to above.   Continue vanc and meropenem  Id consult  Neurosurgery following   Further evaluation/diagnostics/interventions/consults pending course

## 2019-07-20 NOTE — ASSESSMENT & PLAN NOTE
Neurosurgery consulted by ED and is aware patient's case.  Patient does have some mild Neuro impairment with unsteady gait and generalized weakness all extremities on my exam and I have notified Neuro surgery at this time.  Awaiting their response/recommendations.  Patient was started on vancomycin in ED will increase coverage with meropenem.  Given his allergy and findings of osteo on MRI.  Consider addition of nafcillin to regimen pending course.    Will consult ID  Neuro checks  Further evaluation/diagnostics/interventions/consults pending course     07/16- will continue vancomycin / and use Aztreonam -will use cultures to guide therapy     7/18 - ID following,  s/p anterior cervical decompression and fusion, POD # 1, continue Vanco and Aztreonam, will use cultures to guide therapy.     7/19 - ID following,  s/p anterior cervical decompression and fusion, POD # 2, continue Vanco and Meropenem, use cultures to guide therapy.

## 2019-07-21 LAB
ANION GAP SERPL CALC-SCNC: 10 MMOL/L (ref 8–16)
BACTERIA BLD CULT: NORMAL
BACTERIA BLD CULT: NORMAL
BASOPHILS # BLD AUTO: 0 K/UL (ref 0–0.2)
BASOPHILS NFR BLD: 0 % (ref 0–1.9)
BUN SERPL-MCNC: 16 MG/DL (ref 6–20)
CALCIUM SERPL-MCNC: 9 MG/DL (ref 8.7–10.5)
CHLORIDE SERPL-SCNC: 105 MMOL/L (ref 95–110)
CO2 SERPL-SCNC: 24 MMOL/L (ref 23–29)
CREAT SERPL-MCNC: 0.7 MG/DL (ref 0.5–1.4)
DIFFERENTIAL METHOD: ABNORMAL
EOSINOPHIL # BLD AUTO: 0 K/UL (ref 0–0.5)
EOSINOPHIL NFR BLD: 0 % (ref 0–8)
ERYTHROCYTE [DISTWIDTH] IN BLOOD BY AUTOMATED COUNT: 12.6 % (ref 11.5–14.5)
EST. GFR  (AFRICAN AMERICAN): >60 ML/MIN/1.73 M^2
EST. GFR  (NON AFRICAN AMERICAN): >60 ML/MIN/1.73 M^2
GLUCOSE SERPL-MCNC: 136 MG/DL (ref 70–110)
HCT VFR BLD AUTO: 37.5 % (ref 40–54)
HGB BLD-MCNC: 12.8 G/DL (ref 14–18)
LYMPHOCYTES # BLD AUTO: 1.5 K/UL (ref 1–4.8)
LYMPHOCYTES NFR BLD: 10.2 % (ref 18–48)
MCH RBC QN AUTO: 28.7 PG (ref 27–31)
MCHC RBC AUTO-ENTMCNC: 34.1 G/DL (ref 32–36)
MCV RBC AUTO: 84 FL (ref 82–98)
MONOCYTES # BLD AUTO: 0.6 K/UL (ref 0.3–1)
MONOCYTES NFR BLD: 4 % (ref 4–15)
NEUTROPHILS # BLD AUTO: 12.5 K/UL (ref 1.8–7.7)
NEUTROPHILS NFR BLD: 86.3 % (ref 38–73)
PLATELET # BLD AUTO: 378 K/UL (ref 150–350)
PLATELET BLD QL SMEAR: ABNORMAL
PMV BLD AUTO: 9.2 FL (ref 9.2–12.9)
POTASSIUM SERPL-SCNC: 3.9 MMOL/L (ref 3.5–5.1)
RBC # BLD AUTO: 4.46 M/UL (ref 4.6–6.2)
SODIUM SERPL-SCNC: 139 MMOL/L (ref 136–145)
VANCOMYCIN SERPL-MCNC: 10.5 UG/ML
WBC # BLD AUTO: 14.6 K/UL (ref 3.9–12.7)

## 2019-07-21 PROCEDURE — 25000003 PHARM REV CODE 250: Performed by: INTERNAL MEDICINE

## 2019-07-21 PROCEDURE — 85025 COMPLETE CBC W/AUTO DIFF WBC: CPT

## 2019-07-21 PROCEDURE — 80202 ASSAY OF VANCOMYCIN: CPT

## 2019-07-21 PROCEDURE — 36415 COLL VENOUS BLD VENIPUNCTURE: CPT

## 2019-07-21 PROCEDURE — 11000001 HC ACUTE MED/SURG PRIVATE ROOM

## 2019-07-21 PROCEDURE — 80048 BASIC METABOLIC PNL TOTAL CA: CPT

## 2019-07-21 PROCEDURE — 25000003 PHARM REV CODE 250: Performed by: PHYSICIAN ASSISTANT

## 2019-07-21 PROCEDURE — S4991 NICOTINE PATCH NONLEGEND: HCPCS | Performed by: PHYSICIAN ASSISTANT

## 2019-07-21 PROCEDURE — 63600175 PHARM REV CODE 636 W HCPCS: Performed by: INTERNAL MEDICINE

## 2019-07-21 PROCEDURE — 63600175 PHARM REV CODE 636 W HCPCS: Performed by: PHYSICIAN ASSISTANT

## 2019-07-21 RX ORDER — VANCOMYCIN HCL IN 5 % DEXTROSE 1.5G/250ML
1500 PLASTIC BAG, INJECTION (ML) INTRAVENOUS
Status: DISCONTINUED | OUTPATIENT
Start: 2019-07-21 | End: 2019-07-23

## 2019-07-21 RX ADMIN — OXYCODONE HYDROCHLORIDE AND ACETAMINOPHEN 1 TABLET: 7.5; 325 TABLET ORAL at 11:07

## 2019-07-21 RX ADMIN — DEXAMETHASONE SODIUM PHOSPHATE 4 MG: 4 INJECTION, SOLUTION INTRA-ARTICULAR; INTRALESIONAL; INTRAMUSCULAR; INTRAVENOUS; SOFT TISSUE at 11:07

## 2019-07-21 RX ADMIN — OXYCODONE HYDROCHLORIDE AND ACETAMINOPHEN 1 TABLET: 7.5; 325 TABLET ORAL at 03:07

## 2019-07-21 RX ADMIN — Medication 1 PATCH: at 08:07

## 2019-07-21 RX ADMIN — DEXAMETHASONE SODIUM PHOSPHATE 4 MG: 4 INJECTION, SOLUTION INTRA-ARTICULAR; INTRALESIONAL; INTRAMUSCULAR; INTRAVENOUS; SOFT TISSUE at 06:07

## 2019-07-21 RX ADMIN — OXYCODONE HYDROCHLORIDE 15 MG: 5 TABLET ORAL at 08:07

## 2019-07-21 RX ADMIN — THERA TABS 1 TABLET: TAB at 08:07

## 2019-07-21 RX ADMIN — VANCOMYCIN HYDROCHLORIDE 1500 MG: 100 INJECTION, POWDER, LYOPHILIZED, FOR SOLUTION INTRAVENOUS at 11:07

## 2019-07-21 RX ADMIN — DEXAMETHASONE SODIUM PHOSPHATE 4 MG: 4 INJECTION, SOLUTION INTRA-ARTICULAR; INTRALESIONAL; INTRAMUSCULAR; INTRAVENOUS; SOFT TISSUE at 05:07

## 2019-07-21 RX ADMIN — DOCUSATE SODIUM 100 MG: 100 CAPSULE, LIQUID FILLED ORAL at 08:07

## 2019-07-21 RX ADMIN — DEXAMETHASONE SODIUM PHOSPHATE 4 MG: 4 INJECTION, SOLUTION INTRA-ARTICULAR; INTRALESIONAL; INTRAMUSCULAR; INTRAVENOUS; SOFT TISSUE at 01:07

## 2019-07-21 RX ADMIN — RAMELTEON 8 MG: 8 TABLET, FILM COATED ORAL at 08:07

## 2019-07-21 RX ADMIN — VANCOMYCIN HYDROCHLORIDE 1500 MG: 100 INJECTION, POWDER, LYOPHILIZED, FOR SOLUTION INTRAVENOUS at 07:07

## 2019-07-21 RX ADMIN — OXYCODONE HYDROCHLORIDE AND ACETAMINOPHEN 1 TABLET: 7.5; 325 TABLET ORAL at 06:07

## 2019-07-21 RX ADMIN — VANCOMYCIN HYDROCHLORIDE 1250 MG: 100 INJECTION, POWDER, LYOPHILIZED, FOR SOLUTION INTRAVENOUS at 05:07

## 2019-07-21 RX ADMIN — FAMOTIDINE 20 MG: 20 TABLET ORAL at 08:07

## 2019-07-21 NOTE — PLAN OF CARE
Problem: Adult Inpatient Plan of Care  Goal: Plan of Care Review  Outcome: Ongoing (interventions implemented as appropriate)  Remains free from injury. States relief of pain with available prn meds. Abx administered as ordered. Ambulates frequently. Vital signs stable. Chart reviewed. Will continue to monitor.

## 2019-07-21 NOTE — ASSESSMENT & PLAN NOTE
Neurosurgery consulted by ED and is aware patient's case.  Patient does have some mild Neuro impairment with unsteady gait and generalized weakness all extremities on my exam and I have notified Neuro surgery at this time.  Awaiting their response/recommendations.  Patient was started on vancomycin in ED will increase coverage with meropenem.  Given his allergy and findings of osteo on MRI.  Consider addition of nafcillin to regimen pending course.    Will consult ID  Neuro checks  Further evaluation/diagnostics/interventions/consults pending course     07/16- will continue vancomycin / and use Aztreonam -will use cultures to guide therapy     7/18 - ID following,  s/p anterior cervical decompression and fusion, POD # 1, continue Vanco and Aztreonam, will use cultures to guide therapy.     7/19 - ID following,  s/p anterior cervical decompression and fusion, POD # 2, continue Vanco and Meropenem, use cultures to guide therapy.     ID following, continue Vancomycin IV, awaiting LTAC placement

## 2019-07-21 NOTE — PROGRESS NOTES
Pharmacokinetic Assessment Follow Up: IV Vancomycin    Vancomycin serum concentration assessment(s):    The trough level was drawned correctly and can be used to guide therapy at this time.    Vancomycin Regimen Plan:    Change regimen to Vancomycin 1500 mg IV every 8hour with next serum trough concentration measured at 03:30 prior to 3rd dose on 7/22     Pharmacy will continue to follow and monitor vancomycin.    Please contact pharmacy at extension [phone] for questions regarding this assessment.    Thank you for the consult,   Mazin Mcnally     Patient brief summary:  Alex Soares is a 28 y.o. male initiated on antimicrobial therapy with IV Vancomycin for treatment of suspected bone/joint    The patient received a loading dose, followed by the current treatment regimen: vancomycin 1250 mg IV every 8 hours    Drug Allergies:   Review of patient's allergies indicates:   Allergen Reactions    Ceclor [cefaclor] Hives    Cefixime Hives    Cefzil [cefprozil] Hives       Actual Body Weight:   66.1kg    Renal Function:   Estimated Creatinine Clearance: 146.9 mL/min (based on SCr of 0.7 mg/dL).,     Dialysis Method (if applicable):  N/A     CBC (last 72 hours):  Recent Labs   Lab Result Units 07/19/19  0424 07/20/19  0716 07/21/19  0342   WBC K/uL 13.81* 13.85* 14.60*   Hemoglobin g/dL 11.7* 13.1* 12.8*   Hematocrit % 35.3* 40.1 37.5*   Platelets K/uL 329 164 378*   Gran% % 85.2* 87.3* 86.3*   Lymph% % 10.2* 10.6* 10.2*   Mono% % 4.9 2.4* 4.0   Eosinophil% % 0.0 0.0 0.0   Basophil% % 0.0 0.1 0.0   Differential Method  Automated Automated Automated       Metabolic Panel (last 72 hours):  Recent Labs   Lab Result Units 07/19/19  0424 07/20/19  0513 07/21/19  0342   Sodium mmol/L 140 140 139   Potassium mmol/L 3.9 4.2 3.9   Chloride mmol/L 104 104 105   CO2 mmol/L 26 24 24   Glucose mg/dL 128* 115* 136*   BUN, Bld mg/dL 14 12 16   Creatinine mg/dL 0.7 0.7 0.7       Vancomycin Administrations:  vancomycin given in the  last 96 hours                     vancomycin (VANCOCIN) 1,250 mg in dextrose 5 % 250 mL IVPB (mg) 1,250 mg New Bag 07/21/19 0500     1,250 mg New Bag 07/20/19 1918     1,250 mg New Bag  1239     1,250 mg New Bag  0303     1,250 mg New Bag 07/19/19 1948    vancomycin 500 mg in dextrose 5 % 100 mL IVPB (ready to mix system) (mg) 500 mg New Bag 07/19/19 1204    vancomycin in dextrose 5 % 1 gram/250 mL IVPB 1,000 mg (mg) 1,000 mg New Bag 07/19/19 0837     1,000 mg New Bag 07/18/19 2347     1,000 mg New Bag  1352     1,000 mg New Bag  0633     1,000 mg New Bag 07/17/19 2318     1,000 mg New Bag  1419                      Drug levels (last 3 results):  Recent Labs   Lab Result Units 07/19/19  0606 07/20/19  1029 07/21/19  0342   Vancomycin, Random ug/mL  --   --  10.5   Vancomycin-Trough ug/mL 5.0* 14.9  --        Microbiologic Results:  Microbiology Results (last 7 days)       Procedure Component Value Units Date/Time    Aerobic culture [[id number]]  (Abnormal)  (Susceptibility) Collected:  07/17/19 1044    Order Status:  Completed Specimen:  Bone from Vertebra Updated:  07/20/19 1312     Aerobic Bacterial Culture METHICILLIN RESISTANT STAPHYLOCOCCUS AUREUS  Many      Blood culture [[id number]] Collected:  07/15/19 1600    Order Status:  Completed Specimen:  Blood from Peripheral, Upper Arm, Left Updated:  07/20/19 0612     Blood Culture, Routine No Growth to date      No Growth to date      No Growth to date      No Growth to date      No Growth to date    Blood culture [[id number]] Collected:  07/15/19 1619    Order Status:  Completed Specimen:  Blood from Peripheral, Upper Arm, Left Updated:  07/20/19 0612     Blood Culture, Routine No Growth to date      No Growth to date      No Growth to date      No Growth to date      No Growth to date    Culture, Anaerobe [[id number]] Collected:  07/17/19 1044    Order Status:  Completed Specimen:  Bone from Vertebra Updated:  07/19/19 1122     Anaerobic Culture Culture in progress     AFB Culture & Smear [[id number]] Collected:  07/17/19 1044    Order Status:  Completed Specimen:  Bone from Vertebra Updated:  07/19/19 0927     AFB Culture & Smear Culture in progress     AFB CULTURE STAIN No acid fast bacilli seen.    Gram stain [[id number]] Collected:  07/17/19 1044    Order Status:  Completed Specimen:  Bone from Vertebra Updated:  07/17/19 2222     Gram Stain Result Rare WBC's      No organisms seen    Fungus culture [[id number]] Collected:  07/17/19 1044    Order Status:  Sent Specimen:  Bone from Vertebra Updated:  07/17/19 2015    Gram stain [[id number]]     Order Status:  Canceled Specimen:  Tissue     Culture, Anaerobic [[id number]]     Order Status:  Canceled Specimen:  Tissue     AFB Culture & Smear [[id number]]     Order Status:  Canceled Specimen:  Tissue     Fungus culture [[id number]]     Order Status:  Canceled Specimen:  Tissue     Tissue culture [[id number]]     Order Status:  Canceled Specimen:  Tissue

## 2019-07-21 NOTE — PLAN OF CARE
Problem: Adult Inpatient Plan of Care  Goal: Plan of Care Review  Outcome: Ongoing (interventions implemented as appropriate)  Patient remains free of falls and safety precautions maintained. Afebrile. Pain controlled. IV fluids and abx per order. C collar on at all times. Encouraged to ambulate. Will continue to monitor. 12 hour chart check.

## 2019-07-21 NOTE — ASSESSMENT & PLAN NOTE
07/17- Incision and drainage     ID following, plan on 6-8 weeks of antibiotic therapy. Continue Vancomycin IV for now.  Pt has history IV drug abuse, will need LTAC placement

## 2019-07-21 NOTE — PROGRESS NOTES
[facility] Hospital Medicine  Progress Note    Patient Name: Alex Soares  MRN: [id number]  Patient Class: IP- Inpatient   Admission Date: 7/15/2019  Length of Stay: 6 days  Attending Physician: Elayne Akhtar MD  Primary Care Provider: Simi Macdonald MD        Subjective:     Principal Problem:Cervical spinal cord compression      HPI:  28-year-old male with history of IV drug use, last use of heroin about 2 weeks ago, and tattoos, last recent tattoo 4 weeks ago; presents today as a transfer from outside freestanding ER for neck pain. Patient reports he has been having neck pain over the past month and has been seen outpatient but has not improved.  Patient reports playing with the dog earlier which exacerbated his pain and why he presented to the emergency room.  Patient was evaluated in the ER MRI performed and shows a cervical C6-7 with surrounding vertebral body osteomyelitis. Neurosurgery was consulted by ER.  Vancomycin was started.  Hospital Medicine consulted patient admitted.  Patient seen and examined on arrival.  Cervical collar in place on arrival.  Patient with allergies to cephalosporin reports hives in past.  Patient with increased risk of MRSA/MSSA will cover with both vancomycin and meropenem (given patient allergy).    Overview/Hospital Course:  28-year-old male with history of IV drug use, last use of heroin - a day prior to presentation .  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  No definite cord edema at this time.  Postcontrast imaging may be obtained as needed.  2. Vertebral body osteomyelitis of C5, C6, C7, and T1.  3. Anterior paraspinal phlegmon/abscess extending from mid C2 to mid T1  07/17 -s/p Cervical spine decompression.   As of 7/18/2019, patient seen and examined, s/p anterior cervical decompression and fusion, POD # 1 by Dr Jeong.  Patient  reports feeling well, moving all extremities, cervical collar intact. Vitals signs and labs stable. ID following, awaiting culture results from surgery.   As of 7/19, patient seen and examined, s/p anterior cervical decompression and fusion, POD # 2. Patient feeling well, ambulating in clements without difficult. ID following, continue Vancomycin and Meronem. Awaiting cultures to guide therapy.    As of 7/20, s/p s/p anterior cervical decompression and fusion, POD # 3. ID following, plan on 6-8 weeks of antibiotics preferably IV therapy. Awaiting further recommendations.   As of 7/21, POD # 4  s/p anterior cervical decompression and fusion, will need 6-8 weeks of IV antibiotics, patient has history of IV drug abuse. Will need LTAC placement.        Interval History: Pt seen and examined, vital signs and labs stable, awaiting LTAC placement.      Review of Systems   Constitutional: Negative for chills, diaphoresis, fatigue and fever.   HENT: Negative for congestion, ear discharge, rhinorrhea, sinus pressure, sore throat and trouble swallowing.    Eyes: Negative for discharge and visual disturbance.   Respiratory: Negative for apnea, cough, choking, chest tightness, shortness of breath, wheezing and stridor.    Cardiovascular: Negative for chest pain, palpitations and leg swelling.   Gastrointestinal: Negative for abdominal distention, abdominal pain, diarrhea, nausea and vomiting.   Endocrine: Negative for cold intolerance and heat intolerance.   Genitourinary: Negative for dysuria, frequency and hematuria.   Musculoskeletal: Negative for arthralgias, back pain, myalgias and neck pain.   Skin: Negative for pallor and rash.   Neurological: Negative for dizziness, seizures, syncope, weakness and headaches.   Psychiatric/Behavioral: Negative for agitation, confusion and sleep disturbance.     Objective:     Vital Signs (Most Recent):  Temp: 98 °F (36.7 °C) (07/21/19 1208)  Pulse: 83 (07/21/19 1208)  Resp: 18 (07/21/19  1208)  BP: 131/72 (07/21/19 1208)  SpO2: 97 % (07/21/19 1208) Vital Signs (24h Range):  Temp:  [97.5 °F (36.4 °C)-98.4 °F (36.9 °C)] 98 °F (36.7 °C)  Pulse:  [64-85] 83  Resp:  [16-20] 18  SpO2:  [96 %-99 %] 97 %  BP: (130-135)/(71-82) 131/72     Weight: 66.1 kg (145 lb 11.6 oz)  Body mass index is 22.09 kg/m².    Intake/Output Summary (Last 24 hours) at 7/21/2019 1609  Last data filed at 7/21/2019 1431  Gross per 24 hour   Intake 1710 ml   Output 500 ml   Net 1210 ml      Physical Exam   Constitutional: He is oriented to person, place, and time. No distress. Cervical collar in place.   HENT:   Mouth/Throat: No oropharyngeal exudate.   Neck incision intact, no drainage.    Eyes: Right eye exhibits no discharge. Left eye exhibits no discharge.   Neck: No JVD present.   Cardiovascular: Exam reveals no gallop and no friction rub.   No murmur heard.  Pulmonary/Chest: No stridor. No respiratory distress. He has no wheezes. He has no rales. He exhibits no tenderness.   Abdominal: He exhibits no distension and no mass. There is no tenderness. There is no rebound and no guarding. No hernia.   Musculoskeletal: He exhibits no edema or deformity.   Neurological: He is alert and oriented to person, place, and time.   Skin: Skin is warm and dry. Capillary refill takes less than 2 seconds. He is not diaphoretic.   Nursing note and vitals reviewed.      Significant Labs:   CBC:   Recent Labs   Lab 07/20/19  0716 07/21/19  0342   WBC 13.85* 14.60*   HGB 13.1* 12.8*   HCT 40.1 37.5*    378*     CMP:   Recent Labs   Lab 07/20/19  0513 07/21/19  0342    139   K 4.2 3.9    105   CO2 24 24   * 136*   BUN 12 16   CREATININE 0.7 0.7   CALCIUM 9.4 9.0   ANIONGAP 12 10   EGFRNONAA >60 >60       Significant Imaging:     Imaging Results          MRI Cervical Spine Without Contrast (Final result)  Result time 07/15/19 15:59:07    Final result by Paco Merritt Jr., MD (07/15/19 15:59:07)                 Impression:       1. 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  No definite cord edema at this time.  Postcontrast imaging may be obtained as needed.  2. Vertebral body osteomyelitis of C5, C6, C7, and T1.  3. Anterior paraspinal phlegmon/abscess extending from mid C2 to mid T1.  4. Foraminal stenosis at C4-C5, C5-C6, and C6-C7.      Electronically signed by: Paco Merritt Jr., MD  Date:    07/15/2019  Time:    15:59             Narrative:    EXAMINATION:  MRI CERVICAL SPINE WITHOUT CONTRAST    CLINICAL HISTORY:  Spine fracture, traumatic, cervical;    TECHNIQUE:  MR Cervical spine without contrast. Sagittal T1, T2, STIR. Axial 3D, T2. Coronal T1.    COMPARISON:  None available.    FINDINGS:  There is erosive endplate change, complete disc space height loss, and wedge deformity of the C6 and C7 vertebral bodies with abnormal marrow signal most consistent with spondylodiscitis.  There is also abnormal marrow signal within the bodies of C5 and T1.  There is disc space abscess/phlegmon/retropulsion relating to wedge compression deformity at C6-C7 that posteriorly protrudes and flattens the thecal sac to 5 mm AP.  The ligamentum flavum/interspinous ligaments at C5-C6 are stretched taut posteriorly but are intact.  There is extensive anterior paraspinal swelling/edema possibly with central loculated abscess collections.  This extends for a craniocaudal distance of 12 cm from the level of mid C2 to mid T1.  Intervertebral disc levels are as follows:    C2-C3 disc: No significant disc pathology.  Normal facet joints.  No spinal canal or neural foraminal stenosis.    C3-C4 disc: No significant disc pathology.  Normal facet joints.  No spinal canal or neural foraminal stenosis.    C4-C5 disc: Mild disc space height loss.  Mild uncovertebral joint degeneration bilaterally.  The thecal sac measures 11 mm AP.  Mild bilateral foraminal stenosis.    C5-C6 disc:  Disc space height loss with moderate/severe uncovertebral joint degeneration and hypertrophy bilaterally.  There is severe bilateral foraminal stenosis.  The thecal sac measures 9 mm AP.    C6-C7 disc: Level of spondylodiscitis with severe bilateral foraminal stenosis and severe thecal sac stenosis flattened to 5 mm.  Slightly increased T2 signal within the cord.    C7-T1 disc: Normal disc space height and normal facet joints.  Mild bilateral foraminal stenosis relating to phlegmonous material.  The thecal sac measures 12 mm.                               CT Cervical Spine Without Contrast (Edited Result - FINAL)  Result time 07/17/19 12:41:39    Addendum 1 of 1 by Murray Bowman MD (07/17/19 12:41:39)      Compression deformities are seen at C6 and C7.  Wedge compression fracture deformities are noted at C6 and C7.  Retropulsion at C6/7 producing narrowing of the central canal to 6-7 mm and moderate to severe left and mild-to-moderate right neural foraminal narrowing.      Electronically signed by: Murray Bowman MD  Date:    07/17/2019  Time:    12:41               Final result by Murray Bowman MD (07/15/19 15:16:27)                 Impression:      Compression deformities are seen at C6 and C4.  Wedge compression fracture deformities are noted at C6 and C4.  Retropulsion at C6/7 producing narrowing of the central canal to 6-7 mm and moderate to severe left and mild-to-moderate right neural foraminal narrowing.    COMMUNICATION  This critical result was discovered/received at 15:05.  The critical information above was relayed directly by me by telephone to Dr. Foote on 07/15/2019 at 15:07.    All CT scans at this facility use dose modulation, iterative reconstruction, and/or weight base dosing when appropriate to reduce radiation dose to as low as reasonably achievable.      Electronically signed by: Murray Bowmna MD  Date:    07/15/2019  Time:    15:16             Narrative:    EXAMINATION:  CT CERVICAL  SPINE WITHOUT CONTRAST    CLINICAL HISTORY:  Spine fracture, traumatic, cervical;    TECHNIQUE:  1.25 mm axial noncontrast CT images were obtained through the cervical spine using soft tissue and bony algorithm so.  Sagittal coronal reformats were also submitted for interpretation.    COMPARISON:  07/15/2019    FINDINGS:  Compression deformities are seen at C6 and C4.  Wedge compression fracture deformities are noted at C6 and C4 which appear significantly comminuted..  Retropulsion at C6/7 producing narrowing of the central canal to 6-7 mm. Reversal of the normal cervical lordosis with epicenter at C6/7.  Moderate to severe left and mild-to-moderate right neural foraminal narrowing.  Posterior elements appear intact.  The vertebral body heights are otherwise normal.  Intervertebral disc space height is maintained at the remaining levels.  Moderate prevertebral soft tissue swelling within the lower cervical region measuring up to 2.7 cm.    No significant degenerative changes.  Please note that routine CT of the spine is limited in its evaluation of extradural/epidural disease.  Lung apices are clear.                                Assessment/Plan:      * Cervical spinal cord compression  S/P cervical spine decompression       Abscess in epidural space of cervical spine      07/17- Incision and drainage     ID following, plan on 6-8 weeks of antibiotic therapy. Continue Vancomycin IV for now.  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  Patient does have some mild Neuro impairment with unsteady gait and generalized weakness all extremities on my exam and I have notified Neuro surgery at this time.  Awaiting their response/recommendations.  Patient was started on vancomycin in ED will increase coverage with meropenem.  Given his allergy and findings of  osteo on MRI.  Consider addition of nafcillin to regimen pending course.    Will consult ID  Neuro checks  Further evaluation/diagnostics/interventions/consults pending course     07/16- will continue vancomycin / and use Aztreonam -will use cultures to guide therapy     7/18 - ID following,  s/p anterior cervical decompression and fusion, POD # 1, continue Vanco and Aztreonam, will use cultures to guide therapy.     7/19 - ID following,  s/p anterior cervical decompression and fusion, POD # 2, continue Vanco and Meropenem, use cultures to guide therapy.     ID following, continue Vancomycin IV, awaiting LTAC placement      Sepsis  Resolved         Elevated BP without diagnosis of hypertension  Monitor consider daily therapy pending course  Further evaluation/diagnostics/interventions/consults pending course         Hypokalemia  Resolved       IV drug user  Vancomycin.   ID following   Echo complete   The patient was counseled on the dangers of use and encouraged to quit.  Further evaluation/diagnostics/interventions/consults pending course     Needs out patient drug cessation counseling.    Pt will need 6 weeks of IV antibiotics, will LTAC placement        Spondylodiscitis  Vanc/meropenum  Neurosurgery managing.   Cervical collar inplace  Further evaluation/diagnostics/interventions/consults pending course               VTE Risk Mitigation (From admission, onward)        Ordered     IP VTE HIGH RISK PATIENT  Once      07/17/19 1247     Place sequential compression device  Until discontinued      07/17/19 1247                Kevon Leger NP  Department of Hospital Medicine   [facility]

## 2019-07-21 NOTE — PLAN OF CARE
Consult received for ltac / 6 weeks of iv abx. Met with patient and brother. He stated that he spoke with his mother and he will have to do what he needs to do. Preference letter obtained for [facility]. Referral faxed to [facility] via [employer].       07/21/19 2596   Post-Acute Status   Post-Acute Authorization Placement   Post-Acute Placement Status Referrals Sent

## 2019-07-21 NOTE — SUBJECTIVE & OBJECTIVE
Interval History: Pt seen and examined, vital signs and labs stable, awaiting LTAC placement.      Review of Systems   Constitutional: Negative for chills, diaphoresis, fatigue and fever.   HENT: Negative for congestion, ear discharge, rhinorrhea, sinus pressure, sore throat and trouble swallowing.    Eyes: Negative for discharge and visual disturbance.   Respiratory: Negative for apnea, cough, choking, chest tightness, shortness of breath, wheezing and stridor.    Cardiovascular: Negative for chest pain, palpitations and leg swelling.   Gastrointestinal: Negative for abdominal distention, abdominal pain, diarrhea, nausea and vomiting.   Endocrine: Negative for cold intolerance and heat intolerance.   Genitourinary: Negative for dysuria, frequency and hematuria.   Musculoskeletal: Negative for arthralgias, back pain, myalgias and neck pain.   Skin: Negative for pallor and rash.   Neurological: Negative for dizziness, seizures, syncope, weakness and headaches.   Psychiatric/Behavioral: Negative for agitation, confusion and sleep disturbance.     Objective:     Vital Signs (Most Recent):  Temp: 98 °F (36.7 °C) (07/21/19 1208)  Pulse: 83 (07/21/19 1208)  Resp: 18 (07/21/19 1208)  BP: 131/72 (07/21/19 1208)  SpO2: 97 % (07/21/19 1208) Vital Signs (24h Range):  Temp:  [97.5 °F (36.4 °C)-98.4 °F (36.9 °C)] 98 °F (36.7 °C)  Pulse:  [64-85] 83  Resp:  [16-20] 18  SpO2:  [96 %-99 %] 97 %  BP: (130-135)/(71-82) 131/72     Weight: 66.1 kg (145 lb 11.6 oz)  Body mass index is 22.09 kg/m².    Intake/Output Summary (Last 24 hours) at 7/21/2019 1609  Last data filed at 7/21/2019 1431  Gross per 24 hour   Intake 1710 ml   Output 500 ml   Net 1210 ml      Physical Exam   Constitutional: He is oriented to person, place, and time. No distress. Cervical collar in place.   HENT:   Mouth/Throat: No oropharyngeal exudate.   Neck incision intact, no drainage.    Eyes: Right eye exhibits no discharge. Left eye exhibits no discharge.   Neck:  No JVD present.   Cardiovascular: Exam reveals no gallop and no friction rub.   No murmur heard.  Pulmonary/Chest: No stridor. No respiratory distress. He has no wheezes. He has no rales. He exhibits no tenderness.   Abdominal: He exhibits no distension and no mass. There is no tenderness. There is no rebound and no guarding. No hernia.   Musculoskeletal: He exhibits no edema or deformity.   Neurological: He is alert and oriented to person, place, and time.   Skin: Skin is warm and dry. Capillary refill takes less than 2 seconds. He is not diaphoretic.   Nursing note and vitals reviewed.      Significant Labs:   CBC:   Recent Labs   Lab 07/20/19  0716 07/21/19  0342   WBC 13.85* 14.60*   HGB 13.1* 12.8*   HCT 40.1 37.5*    378*     CMP:   Recent Labs   Lab 07/20/19  0513 07/21/19  0342    139   K 4.2 3.9    105   CO2 24 24   * 136*   BUN 12 16   CREATININE 0.7 0.7   CALCIUM 9.4 9.0   ANIONGAP 12 10   EGFRNONAA >60 >60       Significant Imaging:     Imaging Results          MRI Cervical Spine Without Contrast (Final result)  Result time 07/15/19 15:59:07    Final result by Paco Merritt Jr., MD (07/15/19 15:59:07)                 Impression:      1. 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  No definite cord edema at this time.  Postcontrast imaging may be obtained as needed.  2. Vertebral body osteomyelitis of C5, C6, C7, and T1.  3. Anterior paraspinal phlegmon/abscess extending from mid C2 to mid T1.  4. Foraminal stenosis at C4-C5, C5-C6, and C6-C7.      Electronically signed by: Paco Merritt Jr., MD  Date:    07/15/2019  Time:    15:59             Narrative:    EXAMINATION:  MRI CERVICAL SPINE WITHOUT CONTRAST    CLINICAL HISTORY:  Spine fracture, traumatic, cervical;    TECHNIQUE:  MR Cervical spine without contrast. Sagittal T1, T2, STIR. Axial 3D, T2. Coronal T1.    COMPARISON:  None  available.    FINDINGS:  There is erosive endplate change, complete disc space height loss, and wedge deformity of the C6 and C7 vertebral bodies with abnormal marrow signal most consistent with spondylodiscitis.  There is also abnormal marrow signal within the bodies of C5 and T1.  There is disc space abscess/phlegmon/retropulsion relating to wedge compression deformity at C6-C7 that posteriorly protrudes and flattens the thecal sac to 5 mm AP.  The ligamentum flavum/interspinous ligaments at C5-C6 are stretched taut posteriorly but are intact.  There is extensive anterior paraspinal swelling/edema possibly with central loculated abscess collections.  This extends for a craniocaudal distance of 12 cm from the level of mid C2 to mid T1.  Intervertebral disc levels are as follows:    C2-C3 disc: No significant disc pathology.  Normal facet joints.  No spinal canal or neural foraminal stenosis.    C3-C4 disc: No significant disc pathology.  Normal facet joints.  No spinal canal or neural foraminal stenosis.    C4-C5 disc: Mild disc space height loss.  Mild uncovertebral joint degeneration bilaterally.  The thecal sac measures 11 mm AP.  Mild bilateral foraminal stenosis.    C5-C6 disc: Disc space height loss with moderate/severe uncovertebral joint degeneration and hypertrophy bilaterally.  There is severe bilateral foraminal stenosis.  The thecal sac measures 9 mm AP.    C6-C7 disc: Level of spondylodiscitis with severe bilateral foraminal stenosis and severe thecal sac stenosis flattened to 5 mm.  Slightly increased T2 signal within the cord.    C7-T1 disc: Normal disc space height and normal facet joints.  Mild bilateral foraminal stenosis relating to phlegmonous material.  The thecal sac measures 12 mm.                               CT Cervical Spine Without Contrast (Edited Result - FINAL)  Result time 07/17/19 12:41:39    Addendum 1 of 1 by Murray Bowman MD (07/17/19 12:41:39)      Compression deformities are  seen at C6 and C7.  Wedge compression fracture deformities are noted at C6 and C7.  Retropulsion at C6/7 producing narrowing of the central canal to 6-7 mm and moderate to severe left and mild-to-moderate right neural foraminal narrowing.      Electronically signed by: Murray Bowman MD  Date:    07/17/2019  Time:    12:41               Final result by Murray Bowman MD (07/15/19 15:16:27)                 Impression:      Compression deformities are seen at C6 and C4.  Wedge compression fracture deformities are noted at C6 and C4.  Retropulsion at C6/7 producing narrowing of the central canal to 6-7 mm and moderate to severe left and mild-to-moderate right neural foraminal narrowing.    COMMUNICATION  This critical result was discovered/received at 15:05.  The critical information above was relayed directly by me by telephone to Dr. Foote on 07/15/2019 at 15:07.    All CT scans at this facility use dose modulation, iterative reconstruction, and/or weight base dosing when appropriate to reduce radiation dose to as low as reasonably achievable.      Electronically signed by: Murray Bowman MD  Date:    07/15/2019  Time:    15:16             Narrative:    EXAMINATION:  CT CERVICAL SPINE WITHOUT CONTRAST    CLINICAL HISTORY:  Spine fracture, traumatic, cervical;    TECHNIQUE:  1.25 mm axial noncontrast CT images were obtained through the cervical spine using soft tissue and bony algorithm so.  Sagittal coronal reformats were also submitted for interpretation.    COMPARISON:  07/15/2019    FINDINGS:  Compression deformities are seen at C6 and C4.  Wedge compression fracture deformities are noted at C6 and C4 which appear significantly comminuted..  Retropulsion at C6/7 producing narrowing of the central canal to 6-7 mm. Reversal of the normal cervical lordosis with epicenter at C6/7.  Moderate to severe left and mild-to-moderate right neural foraminal narrowing.  Posterior elements appear intact.  The vertebral body  heights are otherwise normal.  Intervertebral disc space height is maintained at the remaining levels.  Moderate prevertebral soft tissue swelling within the lower cervical region measuring up to 2.7 cm.    No significant degenerative changes.  Please note that routine CT of the spine is limited in its evaluation of extradural/epidural disease.  Lung apices are clear.

## 2019-07-21 NOTE — ASSESSMENT & PLAN NOTE
Vancomycin.   ID following   Echo complete   The patient was counseled on the dangers of use and encouraged to quit.  Further evaluation/diagnostics/interventions/consults pending course     Needs out patient drug cessation counseling.    Pt will need 6 weeks of IV antibiotics, will LTAC placement

## 2019-07-21 NOTE — PLAN OF CARE
Consult received for Ltac, will need 6 weeks of iv abx. Met with patient and his brother in room. Discussed referral for ltac and the need for abx . He stated that he really did not want to go to another facility for 6 weeks. His brother stated that he did not know why he could not just go to the [facility] in [place] to have his abx. I informed them both that [facility] is a free standing ER / Urgent Care. They do not have an infusion room. Patient's brother said that he was calling their parents. Encouraged patient to talk with his parents , discuss options for receiving his abx therapy and he could call me with an answer or I will follow up with him in the am. Patient verbalized understanding. CM to follow.       07/21/19 4513   Post-Acute Status   Post-Acute Authorization Placement   Discharge Delays (!) Other

## 2019-07-22 LAB
BACTERIA SPEC ANAEROBE CULT: NORMAL
VANCOMYCIN TROUGH SERPL-MCNC: 13.5 UG/ML (ref 10–22)
VANCOMYCIN TROUGH SERPL-MCNC: 13.7 UG/ML (ref 10–22)

## 2019-07-22 PROCEDURE — 63600175 PHARM REV CODE 636 W HCPCS: Performed by: PHYSICIAN ASSISTANT

## 2019-07-22 PROCEDURE — 80202 ASSAY OF VANCOMYCIN: CPT

## 2019-07-22 PROCEDURE — 25000003 PHARM REV CODE 250: Performed by: INTERNAL MEDICINE

## 2019-07-22 PROCEDURE — 63600175 PHARM REV CODE 636 W HCPCS: Performed by: INTERNAL MEDICINE

## 2019-07-22 PROCEDURE — 97165 OT EVAL LOW COMPLEX 30 MIN: CPT | Performed by: PHYSICAL THERAPIST

## 2019-07-22 PROCEDURE — 25000003 PHARM REV CODE 250: Performed by: PHYSICIAN ASSISTANT

## 2019-07-22 PROCEDURE — 11000001 HC ACUTE MED/SURG PRIVATE ROOM

## 2019-07-22 PROCEDURE — S4991 NICOTINE PATCH NONLEGEND: HCPCS | Performed by: PHYSICIAN ASSISTANT

## 2019-07-22 PROCEDURE — 97116 GAIT TRAINING THERAPY: CPT

## 2019-07-22 PROCEDURE — 80202 ASSAY OF VANCOMYCIN: CPT | Mod: 91

## 2019-07-22 PROCEDURE — 36415 COLL VENOUS BLD VENIPUNCTURE: CPT

## 2019-07-22 PROCEDURE — 97530 THERAPEUTIC ACTIVITIES: CPT

## 2019-07-22 RX ADMIN — DEXAMETHASONE SODIUM PHOSPHATE 4 MG: 4 INJECTION, SOLUTION INTRA-ARTICULAR; INTRALESIONAL; INTRAMUSCULAR; INTRAVENOUS; SOFT TISSUE at 11:07

## 2019-07-22 RX ADMIN — FAMOTIDINE 20 MG: 20 TABLET ORAL at 08:07

## 2019-07-22 RX ADMIN — OXYCODONE HYDROCHLORIDE 15 MG: 5 TABLET ORAL at 12:07

## 2019-07-22 RX ADMIN — Medication 1 PATCH: at 08:07

## 2019-07-22 RX ADMIN — DEXAMETHASONE SODIUM PHOSPHATE 4 MG: 4 INJECTION, SOLUTION INTRA-ARTICULAR; INTRALESIONAL; INTRAMUSCULAR; INTRAVENOUS; SOFT TISSUE at 05:07

## 2019-07-22 RX ADMIN — VANCOMYCIN HYDROCHLORIDE 1500 MG: 100 INJECTION, POWDER, LYOPHILIZED, FOR SOLUTION INTRAVENOUS at 08:07

## 2019-07-22 RX ADMIN — DOCUSATE SODIUM 100 MG: 100 CAPSULE, LIQUID FILLED ORAL at 08:07

## 2019-07-22 RX ADMIN — THERA TABS 1 TABLET: TAB at 08:07

## 2019-07-22 RX ADMIN — OXYCODONE HYDROCHLORIDE 15 MG: 5 TABLET ORAL at 04:07

## 2019-07-22 RX ADMIN — OXYCODONE HYDROCHLORIDE 15 MG: 5 TABLET ORAL at 05:07

## 2019-07-22 RX ADMIN — VANCOMYCIN HYDROCHLORIDE 1500 MG: 100 INJECTION, POWDER, LYOPHILIZED, FOR SOLUTION INTRAVENOUS at 04:07

## 2019-07-22 RX ADMIN — VANCOMYCIN HYDROCHLORIDE 1500 MG: 100 INJECTION, POWDER, LYOPHILIZED, FOR SOLUTION INTRAVENOUS at 11:07

## 2019-07-22 RX ADMIN — FAMOTIDINE 20 MG: 20 TABLET ORAL at 09:07

## 2019-07-22 RX ADMIN — RAMELTEON 8 MG: 8 TABLET, FILM COATED ORAL at 09:07

## 2019-07-22 RX ADMIN — OXYCODONE HYDROCHLORIDE 15 MG: 5 TABLET ORAL at 09:07

## 2019-07-22 RX ADMIN — OXYCODONE HYDROCHLORIDE AND ACETAMINOPHEN 1 TABLET: 7.5; 325 TABLET ORAL at 08:07

## 2019-07-22 NOTE — PLAN OF CARE
Problem: Adult Inpatient Plan of Care  Goal: Plan of Care Review  Outcome: Ongoing (interventions implemented as appropriate)  Patient remains free of falls and safety precautions maintained. Afebrile. Pain controlled. IV abx per order. Contact precautions initiated and maintained. Neuro checks q4. C collar remains on at all times. Will continue to monitor. 12 hour chart check.

## 2019-07-22 NOTE — ASSESSMENT & PLAN NOTE
Neurosurgery consulted by ED and is aware patient's case.  Patient does have some mild Neuro impairment with unsteady gait and generalized weakness all extremities on my exam and I have notified Neuro surgery at this time.  Awaiting their response/recommendations.  Patient was started on vancomycin in ED will increase coverage with meropenem.  Given his allergy and findings of osteo on MRI.  Consider addition of nafcillin to regimen pending course.    Will consult ID  Neuro checks  Further evaluation/diagnostics/interventions/consults pending course     07/16- will continue vancomycin / and use Aztreonam -will use cultures to guide therapy     7/18 - ID following,  s/p anterior cervical decompression and fusion, POD # 1, continue Vanco and Aztreonam, will use cultures to guide therapy.     7/19 - ID following,  s/p anterior cervical decompression and fusion, POD # 2, continue Vanco and Meropenem, use cultures to guide therapy.     ID following, continue Vancomycin IV, awaiting LTAC placement    07/22-will continue IV Vancomycin , will discuss with pharmacy , needs higher vanco trough

## 2019-07-22 NOTE — PLAN OF CARE
Spoke with Shae Marcano, Admissions Director at [facility]. They have received the ltac referral and will evaluate patient this afternoon for admission.         07/22/19 1343   Post-Acute Status   Post-Acute Authorization Placement   Post-Acute Placement Status Patient Evaluation by Facility

## 2019-07-22 NOTE — PLAN OF CARE
07/21/19 1500   Discharge Reassessment   Assessment Type Discharge Planning Reassessment   Provided patient/caregiver education on the expected discharge date and the discharge plan Yes   Do you have any problems affording any of your prescribed medications? No   Discharge Plan A Long-term acute care facility (LTAC)   DME Needed Upon Discharge  other (see comments)  (iv abx)   Patient choice form signed by patient/caregiver Yes   Anticipated Discharge Disposition LTAC   Can the patient answer the patient profile reliably? Yes, cognitively intact   How does the patient rate their overall health at the present time? Good   Describe the patient's ability to walk at the present time. Minor restrictions or changes  (wears C-Collar)   How often would a person be available to care for the patient? Whenever needed   Number of comorbid conditions (as recorded on the chart) One

## 2019-07-22 NOTE — PT/OT/SLP EVAL
Occupational Therapy   Evaluation    Name: Alex Soares  MRN: [id number]  Admitting Diagnosis:  Cervical spinal cord compression 5 Days Post-Op    Recommendations:     Discharge Recommendations: outpatient PT, outpatient OT  Discharge Equipment Recommendations:  none  Barriers to discharge:       Assessment:     Alex Soares is a 28 y.o. male with a medical diagnosis of Cervical spinal cord compression.  He presents with impaired functional mobility and ADLs. Performance deficits affecting function: weakness, impaired endurance, impaired self care skills, impaired functional mobilty, decreased coordination, pain, orthopedic precautions, decreased safety awareness, impaired balance, gait instability, decreased upper extremity function, decreased lower extremity function.      Rehab Prognosis: Good; patient would benefit from acute skilled OT services to address these deficits and reach maximum level of function.       Plan:     Patient to be seen 3 x/week to address the above listed problems via self-care/home management, therapeutic activities, therapeutic exercises  · Plan of Care Expires: 07/29/19  · Plan of Care Reviewed with: patient, mother    Subjective     Chief Complaint: weakness  Patient/Family Comments/goals: to get stronger    Occupational Profile:  Living Environment: lives with mother in trailer with 5-6 steps with 1 handrail  Previous level of function: (I) with ADLs and Amb  Roles and Routines: Drives and not currently working  Equipment Used at Home:  none  Assistance upon Discharge: family    Pain/Comfort:  · Pain Rating 1: 3/10  · Location - Side 1: Right  · Location 1: shoulder  · Pain Addressed 1: Cessation of Activity  · Pain Rating Post-Intervention 1: 3/10    Patients cultural, spiritual, Hoahaoism conflicts given the current situation: no    Objective:     Communicated with: Nurse Paulson and [facility] chart review prior to session.  Patient found HOB elevated with cervical collar,  peripheral IV upon OT entry to room.    General Precautions: Standard, fall   Orthopedic Precautions:(no lifting more than 5#)   Braces: Cervical collar     Occupational Performance:    Bed Mobility:    · Patient completed Rolling/Turning to Left with  supervision  · Patient completed Rolling/Turning to Right with supervision  · Patient completed Scooting/Bridging with supervision  · Patient completed Supine to Sit with supervision    Functional Mobility/Transfers:  · Patient completed Sit <> Stand Transfer with supervision  with  no assistive device   · Patient completed Bed <> Chair Transfer using Step Transfer technique with stand by assistance with no assistive device  · Functional Mobility: SBA    Activities of Daily Living:  · Upper Body Dressing: stand by assistance .  · Lower Body Dressing: stand by assistance .    Cognitive/Visual Perceptual:  Cognitive/Psychosocial Skills:     -       Oriented to: Person, Place, Time and Situation   -       Follows Commands/attention:Follows multistep  commands  -       Communication: clear/fluent  -       Memory: No Deficits noted  -       Safety awareness/insight to disability: impaired   Visual/Perceptual:      -Intact .    Physical Exam:  Dominant hand:    -       right  Upper Extremity Range of Motion:     -       Right Upper Extremity: WFL  -       Left Upper Extremity: WFL  Upper Extremity Strength:    -       Right Upper Extremity: Deficits: 4-/5 grossly  -       Left Upper Extremity: Deficits: 4-/5 grossly   Strength:    -       Right Upper Extremity: WFL  -       Left Upper Extremity: WFL    AMPAC 6 Click ADL:  AMPAC Total Score: 20    Treatment & Education:  OT eval completed as listed above. Pt educated in role of OT and POC.   Education:    Patient left sitting EOB with all lines intact, call button in reach, Nurse Lorene notified and mother present    GOALS:   Multidisciplinary Problems     Occupational Therapy Goals        Problem: Occupational Therapy  Goal    Goal Priority Disciplines Outcome Interventions   Occupational Therapy Goal     OT, PT/OT     Description:  LTGs to be met by 7/29/19  1. Pt will perform toilet t/fs with Mod I  2. Pt will perform LE dressing with Mod I  3. Pt will perform UE dressing with Mod I  4. Pt will perform (B) UE ROM exercises 1 x 20 reps                    History:     Past Medical History:   Diagnosis Date    Asthma        Past Surgical History:   Procedure Laterality Date    APPLICATION, ACELLULAR HUMAN DERMAL ALLOGRAFT N/A 7/17/2019    Performed by S[facility] Jeong [facility] at [facility] OR    CORPECTOMY, SPINE, CERVICAL N/A 7/17/2019    Performed by [facility] at [facility] OR    DECOMPRESSION AND FUSION, SPINE, CERVICAL, ANTERIOR APPROACH N/A 7/17/2019    Performed by [facility]ong [facility] at [facility] OR    INCISION AND DRAINAGE, ABSCESS N/A 7/17/2019    Performed by [facility] at [facility] OR    NO PAST SURGERIES         Time Tracking:     OT Date of Treatment: 07/22/19  OT Start Time: 1030  OT Stop Time: 1045  OT Total Time (min): 15 min    Billable Minutes:Evaluation 15 min    Janey Sheppard, PT/OT  7/22/2019

## 2019-07-22 NOTE — ASSESSMENT & PLAN NOTE
Vanc/meropenum  Neurosurgery managing.   Cervical collar inplace  Further evaluation/diagnostics/interventions/consults pending course       07/22- will plan to complete 6 weeks of IV Vancomycin, needs close follow up

## 2019-07-22 NOTE — PROGRESS NOTES
[facility] Hospital Medicine  Progress Note    Patient Name: Alex Soares  MRN: [id number]  Patient Class: IP- Inpatient   Admission Date: 7/15/2019  Length of Stay: 7 days  Attending Physician: Rony Garcia MD  Primary Care Provider: Simi Macdonald MD        Subjective:     Principal Problem:Cervical spinal cord compression      HPI:  28-year-old male with history of IV drug use, last use of heroin about 2 weeks ago, and tattoos, last recent tattoo 4 weeks ago; presents today as a transfer from outside freestanding ER for neck pain. Patient reports he has been having neck pain over the past month and has been seen outpatient but has not improved.  Patient reports playing with the dog earlier which exacerbated his pain and why he presented to the emergency room.  Patient was evaluated in the ER MRI performed and shows a cervical C6-7 with surrounding vertebral body osteomyelitis. Neurosurgery was consulted by ER.  Vancomycin was started.  Hospital Medicine consulted patient admitted.  Patient seen and examined on arrival.  Cervical collar in place on arrival.  Patient with allergies to cephalosporin reports hives in past.  Patient with increased risk of MRSA/MSSA will cover with both vancomycin and meropenem (given patient allergy).    Overview/Hospital Course:  28-year-old male with history of IV drug use, last use of heroin - a day prior to presentation .  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  No definite cord edema at this time.  Postcontrast imaging may be obtained as needed.  2. Vertebral body osteomyelitis of C5, C6, C7, and T1.  3. Anterior paraspinal phlegmon/abscess extending from mid C2 to mid T1  07/17 -s/p Cervical spine decompression.   As of 7/18/2019, patient seen and examined, s/p anterior cervical decompression and fusion, POD # 1 by Dr Jeong.  Patient  reports feeling well, moving all extremities, cervical collar intact. Vitals signs and labs stable. ID following, awaiting culture results from surgery.   As of 7/19, patient seen and examined, s/p anterior cervical decompression and fusion, POD # 2. Patient feeling well, ambulating in clements without difficult. ID following, continue Vancomycin and Meronem. Awaiting cultures to guide therapy.    As of 7/20, s/p s/p anterior cervical decompression and fusion, POD # 3. ID following, plan on 6-8 weeks of antibiotics preferably IV therapy. Awaiting further recommendations.   As of 7/21, POD # 4  s/p anterior cervical decompression and fusion, will need 6-8 weeks of IV antibiotics, patient has history of IV drug abuse. Will need LTAC placement.      07/22- awaiting LTAC placement , still on IV Vancomycin .       Interval History: 07/22- awaiting LTAC placement , still on IV Vancomycin .    Review of Systems   Constitutional: Negative for chills, diaphoresis, fatigue and fever.   HENT: Negative for congestion, ear discharge, rhinorrhea, sinus pressure, sore throat and trouble swallowing.    Eyes: Negative for discharge and visual disturbance.   Respiratory: Negative for apnea, cough, choking, chest tightness, shortness of breath, wheezing and stridor.    Cardiovascular: Negative for chest pain, palpitations and leg swelling.   Gastrointestinal: Negative for abdominal distention, abdominal pain, diarrhea, nausea and vomiting.   Endocrine: Negative for cold intolerance and heat intolerance.   Genitourinary: Negative for dysuria, frequency and hematuria.   Musculoskeletal: Negative for arthralgias, back pain, myalgias and neck pain.   Skin: Negative for pallor and rash.   Neurological: Negative for dizziness, seizures, syncope, weakness and headaches.   Psychiatric/Behavioral: Negative for agitation, confusion and sleep disturbance.     Objective:     Vital Signs (Most Recent):  Temp: 97.8 °F (36.6 °C) (07/22/19 1712)  Pulse:  75 (07/22/19 1712)  Resp: 18 (07/22/19 1712)  BP: 136/87 (07/22/19 1712)  SpO2: 97 % (07/22/19 1712) Vital Signs (24h Range):  Temp:  [97.7 °F (36.5 °C)-98.5 °F (36.9 °C)] 97.8 °F (36.6 °C)  Pulse:  [67-96] 75  Resp:  [15-20] 18  SpO2:  [97 %-99 %] 97 %  BP: (118-136)/(65-90) 136/87     Weight: 66.1 kg (145 lb 11.6 oz)  Body mass index is 22.09 kg/m².    Intake/Output Summary (Last 24 hours) at 7/22/2019 1849  Last data filed at 7/22/2019 1800  Gross per 24 hour   Intake 3050 ml   Output --   Net 3050 ml      Physical Exam   Constitutional: He is oriented to person, place, and time. No distress. Cervical collar in place.   HENT:   Mouth/Throat: No oropharyngeal exudate.   Eyes: Right eye exhibits no discharge. Left eye exhibits no discharge.   Neck: No JVD present.   Cardiovascular: Exam reveals no gallop and no friction rub.   No murmur heard.  Pulmonary/Chest: No stridor. No respiratory distress. He has no wheezes. He has no rales. He exhibits no tenderness.   Abdominal: He exhibits no distension and no mass. There is no tenderness. There is no rebound and no guarding. No hernia.   Musculoskeletal: He exhibits no edema or deformity.   Neurological: He is alert and oriented to person, place, and time.   Skin: Skin is warm and dry. Capillary refill takes less than 2 seconds. He is not diaphoretic.   Nursing note and vitals reviewed.    Microbiology Results (last 7 days)     Procedure Component Value Units Date/Time    Culture, Anaerobe [[id number]] Collected:  07/17/19 1044    Order Status:  Completed Specimen:  Bone from Vertebra Updated:  07/22/19 1118     Anaerobic Culture No anaerobes isolated    Blood culture [[id number]] Collected:  07/15/19 1619    Order Status:  Completed Specimen:  Blood from Peripheral, Upper Arm, Left Updated:  07/21/19 0612     Blood Culture, Routine No growth after 5 days.    Blood culture [[id number]] Collected:  07/15/19 1600    Order Status:  Completed Specimen:  Blood from Peripheral,  Upper Arm, Left Updated:  07/21/19 0612     Blood Culture, Routine No growth after 5 days.    Aerobic culture [[id number]]  (Abnormal)  (Susceptibility) Collected:  07/17/19 1044    Order Status:  Completed Specimen:  Bone from Vertebra Updated:  07/20/19 1312     Aerobic Bacterial Culture METHICILLIN RESISTANT STAPHYLOCOCCUS AUREUS  Many      AFB Culture & Smear [[id number]] Collected:  07/17/19 1044    Order Status:  Completed Specimen:  Bone from Vertebra Updated:  07/19/19 0927     AFB Culture & Smear Culture in progress     AFB CULTURE STAIN No acid fast bacilli seen.    Gram stain [[id number]] Collected:  07/17/19 1044    Order Status:  Completed Specimen:  Bone from Vertebra Updated:  07/17/19 2222     Gram Stain Result Rare WBC's      No organisms seen    Fungus culture [[id number]] Collected:  07/17/19 1044    Order Status:  Sent Specimen:  Bone from Vertebra Updated:  07/17/19 2015    Gram stain [[id number]]     Order Status:  Canceled Specimen:  Tissue     Culture, Anaerobic [[id number]]     Order Status:  Canceled Specimen:  Tissue     AFB Culture & Smear [[id number]]     Order Status:  Canceled Specimen:  Tissue     Fungus culture [[id number]]     Order Status:  Canceled Specimen:  Tissue     Tissue culture [[id number]]     Order Status:  Canceled Specimen:  Tissue           Significant Labs: All pertinent labs within the past 24 hours have been reviewed.    Significant Imaging: I have reviewed and interpreted all pertinent imaging results/findings within the past 24 hours.      Assessment/Plan:      * Cervical spinal cord compression  S/P cervical spine decompression       Abscess in epidural space of cervical spine      07/17- Incision and drainage     ID following, plan on 6-8 weeks of antibiotic therapy. Continue Vancomycin IV for now.  Pt has history IV drug abuse, will need LTAC placement     Sepsis  Resolved         Elevated BP without diagnosis of hypertension  Monitor consider daily therapy pending  course  Further evaluation/diagnostics/interventions/consults pending course         Hypokalemia  Resolved       IV drug user  Vancomycin.   ID following   Echo complete   The patient was counseled on the dangers of use and encouraged to quit.  Further evaluation/diagnostics/interventions/consults pending course     Needs out patient drug cessation counseling.    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  Patient does have some mild Neuro impairment with unsteady gait and generalized weakness all extremities on my exam and I have notified Neuro surgery at this time.  Awaiting their response/recommendations.  Patient was started on vancomycin in ED will increase coverage with meropenem.  Given his allergy and findings of osteo on MRI.  Consider addition of nafcillin to regimen pending course.    Will consult ID  Neuro checks  Further evaluation/diagnostics/interventions/consults pending course     07/16- will continue vancomycin / and use Aztreonam -will use cultures to guide therapy     7/18 - ID following,  s/p anterior cervical decompression and fusion, POD # 1, continue Vanco and Aztreonam, will use cultures to guide therapy.     7/19 - ID following,  s/p anterior cervical decompression and fusion, POD # 2, continue Vanco and Meropenem, use cultures to guide therapy.     ID following, continue Vancomycin IV, awaiting LTAC placement    07/22-will continue IV Vancomycin , will discuss with pharmacy , needs higher vanco trough       Spondylodiscitis  Vanc/meropenum  Neurosurgery managing.   Cervical collar inplace  Further evaluation/diagnostics/interventions/consults pending course       07/22- will plan to complete 6 weeks of IV Vancomycin, needs close follow up                VTE Risk Mitigation (From admission, onward)        Ordered     IP  VTE HIGH RISK PATIENT  Once      07/17/19 1247     Place sequential compression device  Until discontinued      07/17/19 1247                Rony Garcia MD  Department of Hospital Medicine   [facility]

## 2019-07-22 NOTE — PLAN OF CARE
Problem: Adult Inpatient Plan of Care  Goal: Plan of Care Review  Outcome: Ongoing (interventions implemented as appropriate)  Remains free from injury. States relief of pain with available prn meds. Abx administered as ordered. c-collar in place at all times. Vital signs stable. Chart reviewed. Will continue to monitor.

## 2019-07-22 NOTE — PT/OT/SLP PROGRESS
Physical Therapy  Treatment    Alex Soares   MRN: [id number]   Admitting Diagnosis: Cervical spinal cord compression    PT Received On: 07/22/19  PT Start Time: 1031     PT Stop Time: 1054    PT Total Time (min): 23 min       Billable Minutes:  Gait Training 13 and Therapeutic Activity 10    Treatment Type: Treatment  PT/PTA: PT     PTA Visit Number: 1       General Precautions: Standard, fall  Orthopedic Precautions: N/A   Braces: N/A         Subjective:  Communicated with NURSE MAN AND Epic CHART REVIEW prior to session.  PT AGREED TO TX     Pain/Comfort  Pain Rating 1: 0/10  Pain Rating Post-Intervention 1: 0/10    Objective:   Patient found with: peripheral IV    Functional Mobility:  PT MET IN RM SUP IN BED. PT SUP.SIT EOB IND. PT STOOD WITH NO AD AND GT TRAINED WITH UNSTEADY GT X 500' ON LEVEL INDOOR SURFACES WITH ONE LOB AND ATAXIC GT. PT TO STAIRS FOR STAIR TRAINING TO ASCEND AND DESCEND 2X1 FLIGHT OF STEPS WITH CGA AND USE OF 1 RAILING. PT WITH INC FATIGUE NOTED AND GT BECAME MORE UNSTEADY WITH FATIGUE. PT RETURNED TO  T/F TO BEDSIDE AND LEFT WITH ALL NEEDS MET AND CALL BELL IN REACH.     AM-PAC 6 CLICK MOBILITY  How much help from another person does this patient currently need?   1 = Unable, Total/Dependent Assistance  2 = A lot, Maximum/Moderate Assistance  3 = A little, Minimum/Contact Guard/Supervision  4 = None, Modified [place]/Independent    Turning over in bed (including adjusting bedclothes, sheets and blankets)?: 4  Sitting down on and standing up from a chair with arms (e.g., wheelchair, bedside commode, etc.): 4  Moving from lying on back to sitting on the side of the bed?: 4  Moving to and from a bed to a chair (including a wheelchair)?: 4  Need to walk in hospital room?: 4  Climbing 3-5 steps with a railing?: 3  Basic Mobility Total Score: 23    AM-PAC Raw Score CMS G-Code Modifier Level of Impairment Assistance   6 % Total / Unable   7 - 9 CM 80 - 100% Maximal Assist    10 - 14 CL 60 - 80% Moderate Assist   15 - 19 CK 40 - 60% Moderate Assist   20 - 22 CJ 20 - 40% Minimal Assist   23 CI 1-20% SBA / CGA   24 CH 0% Independent/ Mod I     Patient left SEATED EOB with call button in reach and AND ALL NEEDS MET. .    Assessment:  PT CYNTHIA TX WELL AND PROGRESSING WITH STAIR TRAINING.     Rehab identified problem list/impairments: Rehab identified problem list/impairments: weakness, gait instability, impaired endurance, decreased lower extremity function, impaired functional mobilty, orthopedic precautions, impaired balance    Rehab potential is excellent.    Activity tolerance: Good    Discharge recommendations: Discharge Facility/Level of Care Needs: outpatient PT     Barriers to discharge:      Equipment recommendations: Equipment Needed After Discharge: none     GOALS:   Multidisciplinary Problems     Physical Therapy Goals        Problem: Physical Therapy Goal    Goal Priority Disciplines Outcome Goal Variances Interventions   Physical Therapy Goal     PT, PT/OT Ongoing (interventions implemented as appropriate)     Description:  PT WILL BE SEEN FOR P.T. FOR A MIN OF 5 OUT OF 7 DAYS A WEEK  LT19  1. PT WILL COMPLETE BED MOBILITY IND  2. PT WILL T/F TO CHAIR IND  3. PT WILL GT TRAIN X 700' IND   4. PT WILL COMPLETE B LE TE X 20 REPS  5. PT WILL ASCEND AND DESCEND 1 FLIGHT OF STEPS MOD I                       PLAN:    Patient to be seen to address the above listed problems via gait training, therapeutic activities, therapeutic exercises  Plan of Care expires: 19  Plan of Care reviewed with: patient         Abby Reich, PT  2019

## 2019-07-22 NOTE — SUBJECTIVE & OBJECTIVE
Interval History: 07/22- awaiting LTAC placement , still on IV Vancomycin .    Review of Systems   Constitutional: Negative for chills, diaphoresis, fatigue and fever.   HENT: Negative for congestion, ear discharge, rhinorrhea, sinus pressure, sore throat and trouble swallowing.    Eyes: Negative for discharge and visual disturbance.   Respiratory: Negative for apnea, cough, choking, chest tightness, shortness of breath, wheezing and stridor.    Cardiovascular: Negative for chest pain, palpitations and leg swelling.   Gastrointestinal: Negative for abdominal distention, abdominal pain, diarrhea, nausea and vomiting.   Endocrine: Negative for cold intolerance and heat intolerance.   Genitourinary: Negative for dysuria, frequency and hematuria.   Musculoskeletal: Negative for arthralgias, back pain, myalgias and neck pain.   Skin: Negative for pallor and rash.   Neurological: Negative for dizziness, seizures, syncope, weakness and headaches.   Psychiatric/Behavioral: Negative for agitation, confusion and sleep disturbance.     Objective:     Vital Signs (Most Recent):  Temp: 97.8 °F (36.6 °C) (07/22/19 1712)  Pulse: 75 (07/22/19 1712)  Resp: 18 (07/22/19 1712)  BP: 136/87 (07/22/19 1712)  SpO2: 97 % (07/22/19 1712) Vital Signs (24h Range):  Temp:  [97.7 °F (36.5 °C)-98.5 °F (36.9 °C)] 97.8 °F (36.6 °C)  Pulse:  [67-96] 75  Resp:  [15-20] 18  SpO2:  [97 %-99 %] 97 %  BP: (118-136)/(65-90) 136/87     Weight: 66.1 kg (145 lb 11.6 oz)  Body mass index is 22.09 kg/m².    Intake/Output Summary (Last 24 hours) at 7/22/2019 1849  Last data filed at 7/22/2019 1800  Gross per 24 hour   Intake 3050 ml   Output --   Net 3050 ml      Physical Exam   Constitutional: He is oriented to person, place, and time. No distress. Cervical collar in place.   HENT:   Mouth/Throat: No oropharyngeal exudate.   Eyes: Right eye exhibits no discharge. Left eye exhibits no discharge.   Neck: No JVD present.   Cardiovascular: Exam reveals no gallop  and no friction rub.   No murmur heard.  Pulmonary/Chest: No stridor. No respiratory distress. He has no wheezes. He has no rales. He exhibits no tenderness.   Abdominal: He exhibits no distension and no mass. There is no tenderness. There is no rebound and no guarding. No hernia.   Musculoskeletal: He exhibits no edema or deformity.   Neurological: He is alert and oriented to person, place, and time.   Skin: Skin is warm and dry. Capillary refill takes less than 2 seconds. He is not diaphoretic.   Nursing note and vitals reviewed.    Microbiology Results (last 7 days)     Procedure Component Value Units Date/Time    Culture, Anaerobe [[id number]] Collected:  07/17/19 1044    Order Status:  Completed Specimen:  Bone from Vertebra Updated:  07/22/19 1118     Anaerobic Culture No anaerobes isolated    Blood culture [[id number]] Collected:  07/15/19 1619    Order Status:  Completed Specimen:  Blood from Peripheral, Upper Arm, Left Updated:  07/21/19 0612     Blood Culture, Routine No growth after 5 days.    Blood culture [[id number]] Collected:  07/15/19 1600    Order Status:  Completed Specimen:  Blood from Peripheral, Upper Arm, Left Updated:  07/21/19 0612     Blood Culture, Routine No growth after 5 days.    Aerobic culture [[id number]]  (Abnormal)  (Susceptibility) Collected:  07/17/19 1044    Order Status:  Completed Specimen:  Bone from Vertebra Updated:  07/20/19 1312     Aerobic Bacterial Culture METHICILLIN RESISTANT STAPHYLOCOCCUS AUREUS  Many      AFB Culture & Smear [[id number]] Collected:  07/17/19 1044    Order Status:  Completed Specimen:  Bone from Vertebra Updated:  07/19/19 0927     AFB Culture & Smear Culture in progress     AFB CULTURE STAIN No acid fast bacilli seen.    Gram stain [[id number]] Collected:  07/17/19 1044    Order Status:  Completed Specimen:  Bone from Vertebra Updated:  07/17/19 2222     Gram Stain Result Rare WBC's      No organisms seen    Fungus culture [[id number]] Collected:   07/17/19 1044    Order Status:  Sent Specimen:  Bone from Vertebra Updated:  07/17/19 2015    Gram stain [[id number]]     Order Status:  Canceled Specimen:  Tissue     Culture, Anaerobic [[id number]]     Order Status:  Canceled Specimen:  Tissue     AFB Culture & Smear [[id number]]     Order Status:  Canceled Specimen:  Tissue     Fungus culture [[id number]]     Order Status:  Canceled Specimen:  Tissue     Tissue culture [[id number]]     Order Status:  Canceled Specimen:  Tissue           Significant Labs: All pertinent labs within the past 24 hours have been reviewed.    Significant Imaging: I have reviewed and interpreted all pertinent imaging results/findings within the past 24 hours.

## 2019-07-22 NOTE — PLAN OF CARE
Problem: Physical Therapy Goal  Goal: Physical Therapy Goal  PT WILL BE SEEN FOR P.T. FOR A MIN OF 5 OUT OF 7 DAYS A WEEK  LT19  1. PT WILL COMPLETE BED MOBILITY IND  2. PT WILL T/F TO CHAIR IND  3. PT WILL GT TRAIN X 500' IND   4. PT WILL COMPLETE B LE TE X 20 REPS     Outcome: Ongoing (interventions implemented as appropriate)  PT GT TRAINED X 500' WITH NO AD WITH S AND ATAXIC GT. PT ASCENDED AND DESCENDED 1 FLIGHT OF STEPS WITH USE OF RAILING AND CGA.

## 2019-07-23 LAB
ANION GAP SERPL CALC-SCNC: 9 MMOL/L (ref 8–16)
BASOPHILS # BLD AUTO: 0.01 K/UL (ref 0–0.2)
BASOPHILS NFR BLD: 0 % (ref 0–1.9)
BUN SERPL-MCNC: 17 MG/DL (ref 6–20)
CALCIUM SERPL-MCNC: 8.6 MG/DL (ref 8.7–10.5)
CHLORIDE SERPL-SCNC: 103 MMOL/L (ref 95–110)
CO2 SERPL-SCNC: 25 MMOL/L (ref 23–29)
CREAT SERPL-MCNC: 0.7 MG/DL (ref 0.5–1.4)
CRP SERPL-MCNC: 1.2 MG/L (ref 0–8.2)
DIFFERENTIAL METHOD: ABNORMAL
EOSINOPHIL # BLD AUTO: 0 K/UL (ref 0–0.5)
EOSINOPHIL NFR BLD: 0 % (ref 0–8)
ERYTHROCYTE [DISTWIDTH] IN BLOOD BY AUTOMATED COUNT: 13.1 % (ref 11.5–14.5)
ERYTHROCYTE [SEDIMENTATION RATE] IN BLOOD BY WESTERGREN METHOD: 10 MM/HR (ref 0–10)
EST. GFR  (AFRICAN AMERICAN): >60 ML/MIN/1.73 M^2
EST. GFR  (NON AFRICAN AMERICAN): >60 ML/MIN/1.73 M^2
GLUCOSE SERPL-MCNC: 126 MG/DL (ref 70–110)
HCT VFR BLD AUTO: 38.2 % (ref 40–54)
HGB BLD-MCNC: 12.6 G/DL (ref 14–18)
LYMPHOCYTES # BLD AUTO: 1.9 K/UL (ref 1–4.8)
LYMPHOCYTES NFR BLD: 8.5 % (ref 18–48)
MCH RBC QN AUTO: 28.1 PG (ref 27–31)
MCHC RBC AUTO-ENTMCNC: 33 G/DL (ref 32–36)
MCV RBC AUTO: 85 FL (ref 82–98)
MONOCYTES # BLD AUTO: 1.3 K/UL (ref 0.3–1)
MONOCYTES NFR BLD: 5.9 % (ref 4–15)
NEUTROPHILS # BLD AUTO: 18.7 K/UL (ref 1.8–7.7)
NEUTROPHILS NFR BLD: 86.9 % (ref 38–73)
PLATELET # BLD AUTO: 393 K/UL (ref 150–350)
PMV BLD AUTO: 9 FL (ref 9.2–12.9)
POTASSIUM SERPL-SCNC: 4 MMOL/L (ref 3.5–5.1)
RBC # BLD AUTO: 4.48 M/UL (ref 4.6–6.2)
SODIUM SERPL-SCNC: 137 MMOL/L (ref 136–145)
VANCOMYCIN TROUGH SERPL-MCNC: 17.5 UG/ML (ref 10–22)
VANCOMYCIN TROUGH SERPL-MCNC: 21.9 UG/ML (ref 10–22)
WBC # BLD AUTO: 21.87 K/UL (ref 3.9–12.7)

## 2019-07-23 PROCEDURE — 63600175 PHARM REV CODE 636 W HCPCS: Performed by: PHYSICIAN ASSISTANT

## 2019-07-23 PROCEDURE — 11000001 HC ACUTE MED/SURG PRIVATE ROOM

## 2019-07-23 PROCEDURE — 97110 THERAPEUTIC EXERCISES: CPT

## 2019-07-23 PROCEDURE — 80202 ASSAY OF VANCOMYCIN: CPT

## 2019-07-23 PROCEDURE — 86140 C-REACTIVE PROTEIN: CPT

## 2019-07-23 PROCEDURE — 85651 RBC SED RATE NONAUTOMATED: CPT

## 2019-07-23 PROCEDURE — S4991 NICOTINE PATCH NONLEGEND: HCPCS | Performed by: PHYSICIAN ASSISTANT

## 2019-07-23 PROCEDURE — 25000003 PHARM REV CODE 250: Performed by: PHYSICIAN ASSISTANT

## 2019-07-23 PROCEDURE — 25000003 PHARM REV CODE 250: Performed by: INTERNAL MEDICINE

## 2019-07-23 PROCEDURE — 97116 GAIT TRAINING THERAPY: CPT

## 2019-07-23 PROCEDURE — 85025 COMPLETE CBC W/AUTO DIFF WBC: CPT

## 2019-07-23 PROCEDURE — 97530 THERAPEUTIC ACTIVITIES: CPT

## 2019-07-23 PROCEDURE — 63600175 PHARM REV CODE 636 W HCPCS: Performed by: INTERNAL MEDICINE

## 2019-07-23 PROCEDURE — 36415 COLL VENOUS BLD VENIPUNCTURE: CPT

## 2019-07-23 PROCEDURE — 80048 BASIC METABOLIC PNL TOTAL CA: CPT

## 2019-07-23 RX ORDER — RIFAMPIN 300 MG/1
600 CAPSULE ORAL DAILY
Status: DISCONTINUED | OUTPATIENT
Start: 2019-07-23 | End: 2019-07-25 | Stop reason: HOSPADM

## 2019-07-23 RX ADMIN — FAMOTIDINE 20 MG: 20 TABLET ORAL at 08:07

## 2019-07-23 RX ADMIN — Medication 1 PATCH: at 08:07

## 2019-07-23 RX ADMIN — VANCOMYCIN HYDROCHLORIDE 1750 MG: 750 INJECTION, POWDER, LYOPHILIZED, FOR SOLUTION INTRAVENOUS at 09:07

## 2019-07-23 RX ADMIN — THERA TABS 1 TABLET: TAB at 08:07

## 2019-07-23 RX ADMIN — OXYCODONE HYDROCHLORIDE 15 MG: 5 TABLET ORAL at 03:07

## 2019-07-23 RX ADMIN — OXYCODONE HYDROCHLORIDE AND ACETAMINOPHEN 1 TABLET: 7.5; 325 TABLET ORAL at 08:07

## 2019-07-23 RX ADMIN — VANCOMYCIN HYDROCHLORIDE 1750 MG: 750 INJECTION, POWDER, LYOPHILIZED, FOR SOLUTION INTRAVENOUS at 04:07

## 2019-07-23 RX ADMIN — DEXAMETHASONE SODIUM PHOSPHATE 4 MG: 4 INJECTION, SOLUTION INTRA-ARTICULAR; INTRALESIONAL; INTRAMUSCULAR; INTRAVENOUS; SOFT TISSUE at 12:07

## 2019-07-23 RX ADMIN — RIFAMPIN 600 MG: 300 CAPSULE ORAL at 06:07

## 2019-07-23 RX ADMIN — DEXAMETHASONE SODIUM PHOSPHATE 4 MG: 4 INJECTION, SOLUTION INTRA-ARTICULAR; INTRALESIONAL; INTRAMUSCULAR; INTRAVENOUS; SOFT TISSUE at 06:07

## 2019-07-23 RX ADMIN — VANCOMYCIN HYDROCHLORIDE 1750 MG: 750 INJECTION, POWDER, LYOPHILIZED, FOR SOLUTION INTRAVENOUS at 01:07

## 2019-07-23 RX ADMIN — OXYCODONE HYDROCHLORIDE 15 MG: 5 TABLET ORAL at 08:07

## 2019-07-23 RX ADMIN — DOCUSATE SODIUM 100 MG: 100 CAPSULE, LIQUID FILLED ORAL at 08:07

## 2019-07-23 NOTE — PROGRESS NOTES
Pharmacokinetic Assessment Follow Up: IV Vancomycin    Vancomycin serum concentration assessment(s):    Vancomycin trough: 17.5 mcg/ml   The trough level was drawn correctly and can be used to guide therapy at this time.  Goal trough: 15-20 mcg/ml     Vancomycin Regimen Plan:    Continue Vancomycin 1750 mg IV every 8 hours with next serum trough concentration measured at 2030 prior to next dose on 07/23     Pharmacy will continue to follow and monitor vancomycin.    Please contact pharmacy at extension [phone] for questions regarding this assessment.    Thank you for the consult,   Judie Mejias     Patient brief summary:  Alex Soares is a 28 y.o. male initiated on antimicrobial therapy with IV Vancomycin for treatment of suspected bone/joint    Drug Allergies:   Review of patient's allergies indicates:   Allergen Reactions    Ceclor [cefaclor] Hives    Cefixime Hives    Cefzil [cefprozil] Hives       Actual Body Weight:   66.1 kg    Renal Function:   Estimated Creatinine Clearance: 146.9 mL/min (based on SCr of 0.7 mg/dL).,     Dialysis Method (if applicable):  N/A     CBC (last 72 hours):  Recent Labs   Lab Result Units 07/21/19  0342 07/23/19  0549   WBC K/uL 14.60* 21.87*   Hemoglobin g/dL 12.8* 12.6*   Hematocrit % 37.5* 38.2*   Platelets K/uL 378* 393*   Gran% % 86.3* 86.9*   Lymph% % 10.2* 8.5*   Mono% % 4.0 5.9   Eosinophil% % 0.0 0.0   Basophil% % 0.0 0.0   Differential Method  Automated Automated       Metabolic Panel (last 72 hours):  Recent Labs   Lab Result Units 07/21/19  0342 07/23/19  0549   Sodium mmol/L 139 137   Potassium mmol/L 3.9 4.0   Chloride mmol/L 105 103   CO2 mmol/L 24 25   Glucose mg/dL 136* 126*   BUN, Bld mg/dL 16 17   Creatinine mg/dL 0.7 0.7       Vancomycin Administrations:  vancomycin given in the last 96 hours                     vancomycin 1.75 g in 5 % dextrose 500 mL IVPB (mg) 1,750 mg New Bag 07/23/19 1338     1,750 mg New Bag  0429    vancomycin 1.5 g in 5 % dextrose  250 mL IVPB (mg) 1,500 mg New Bag 07/22/19 2050     1,500 mg New Bag  1158     1,500 mg New Bag  0404     1,500 mg New Bag 07/21/19 1917     1,500 mg New Bag  1105                      Drug levels (last 3 results):  Recent Labs   Lab Result Units 07/21/19  0342 07/22/19  0345 07/22/19  2021 07/23/19  1231   Vancomycin, Random ug/mL 10.5  --   --   --    Vancomycin-Trough ug/mL  --  13.5 13.7 17.5       Microbiologic Results:  Microbiology Results (last 7 days)       Procedure Component Value Units Date/Time    Culture, Anaerobe [[id number]] Collected:  07/17/19 1044    Order Status:  Completed Specimen:  Bone from Vertebra Updated:  07/22/19 1118     Anaerobic Culture No anaerobes isolated    Blood culture [[id number]] Collected:  07/15/19 1619    Order Status:  Completed Specimen:  Blood from Peripheral, Upper Arm, Left Updated:  07/21/19 0612     Blood Culture, Routine No growth after 5 days.    Blood culture [[id number]] Collected:  07/15/19 1600    Order Status:  Completed Specimen:  Blood from Peripheral, Upper Arm, Left Updated:  07/21/19 0612     Blood Culture, Routine No growth after 5 days.    Aerobic culture [[id number]]  (Abnormal)  (Susceptibility) Collected:  07/17/19 1044    Order Status:  Completed Specimen:  Bone from Vertebra Updated:  07/20/19 1312     Aerobic Bacterial Culture METHICILLIN RESISTANT STAPHYLOCOCCUS AUREUS  Many      AFB Culture & Smear [[id number]] Collected:  07/17/19 1044    Order Status:  Completed Specimen:  Bone from Vertebra Updated:  07/19/19 0927     AFB Culture & Smear Culture in progress     AFB CULTURE STAIN No acid fast bacilli seen.    Gram stain [[id number]] Collected:  07/17/19 1044    Order Status:  Completed Specimen:  Bone from Vertebra Updated:  07/17/19 2222     Gram Stain Result Rare WBC's      No organisms seen    Fungus culture [[id number]] Collected:  07/17/19 1044    Order Status:  Sent Specimen:  Bone from Vertebra Updated:  07/17/19 2015    Gram stain [[id number]]      Order Status:  Canceled Specimen:  Tissue     Culture, Anaerobic [[id number]]     Order Status:  Canceled Specimen:  Tissue     AFB Culture & Smear [[id number]]     Order Status:  Canceled Specimen:  Tissue     Fungus culture [[id number]]     Order Status:  Canceled Specimen:  Tissue     Tissue culture [[id number]]     Order Status:  Canceled Specimen:  Tissue

## 2019-07-23 NOTE — PLAN OF CARE
Problem: Adult Inpatient Plan of Care  Goal: Plan of Care Review  Outcome: Ongoing (interventions implemented as appropriate)  Fall precautions maintained. Pt free from falls/injuries.  Patient complains of pain. Pain controlled with prn meds.  Antibiotics given as prescribed.  Ambulates and repositions with assistance.  Plan of care and medications discussed with patient.  Patient verbalized understanding.  Bed locked and low, call bell within reach.  Chart check done. Will continue to monitor.

## 2019-07-23 NOTE — ASSESSMENT & PLAN NOTE
Vanc/meropenum  Neurosurgery managing.   Cervical collar inplace  Further evaluation/diagnostics/interventions/consults pending course       07/22- will plan to complete 6 weeks of IV Vancomycin, needs close follow up      07/23- will add rifampin , continue IV Vancomycin and will plan to complete 6 weeks of IV therapy .

## 2019-07-23 NOTE — PT/OT/SLP PROGRESS
Occupational Therapy  Treatment    Alex Soares   MRN: [id number]   Admitting Diagnosis: Cervical spinal cord compression    OT Date of Treatment: 07/23/19   OT Start Time: 1305  OT Stop Time: 1330  OT Total Time (min): 25 min    Billable Minutes:  Therapeutic Activity 15 minutes and Therapeutic Exercise 10 minutes    General Precautions: Standard, contact, fall  Orthopedic Precautions: (lifting precautions: no lifting over 5 lbs; )  Braces: Cervical collar    Subjective:  Communicated with nurse Danielle and epic chart review prior to session.    Pain/Comfort  Pain Rating 1: 0/10    Objective:  Patient found with: peripheral IV, cervical collar     Functional Mobility:  Bed Mobility:      Transfers:   sba    Functional Ambulation:  Pt ambulated 100 feet x2 with cga/sba at times with unsteady gait    Activities of Daily Living:     Feeding adaptive equipment: na  UE adaptive equipment: na     LE adaptive equipment:mod (I)                     Bathing adaptive equipment: na  Balance:   Static Sit: GOOD: Takes MODERATE challenges from all directions  Dynamic Sit: GOOD-: Incosistently Maintains balance through MODERATE excursions of active trunk movement,     Static Stand: FAIR+: Takes MINIMAL challenges from all directions  Dynamic stand: FAIR: Needs CONTACT GUARD during gait/ fair    Therapeutic Activities and Exercises:  Pt performed 1 set x 15 reps b ue rom exercise standing upright posture. Pt displayed posterior swaying during functional exercise    AM-PAC 6 CLICK ADL   How much help from another person does this patient currently need?   1 = Unable, Total/Dependent Assistance  2 = A lot, Maximum/Moderate Assistance  3 = A little, Minimum/Contact Guard/Supervision  4 = None, Modified [place]/Independent    Putting on and taking off regular lower body clothing? : 3  Bathing (including washing, rinsing, drying)?: 3  Toileting, which includes using toilet, bedpan, or urinal? : 3  Putting on and taking off  "regular upper body clothing?: 3  Taking care of personal grooming such as brushing teeth?: 4  Eating meals?: 4  Daily Activity Total Score: 20     AM-PAC Raw Score CMS "G-Code Modifier Level of Impairment Assistance   6 % Total / Unable   7 - 8 CM 80 - 100% Maximal Assist   9-13 CL 60 - 80% Moderate Assist   14 - 19 CK 40 - 60% Moderate Assist   20 - 22 CJ 20 - 40% Minimal Assist   23 CI 1-20% SBA / CGA   24 CH 0% Independent/ Mod I       Patient left sittiong eob per pt request with all lines intact, call button in reach, nurse notified and bother present    ASSESSMENT:  Alex Soares is a 28 y.o. male with a medical diagnosis of Cervical spinal cord compression and presents with impaired functional mobility and slight decrease b ue strength/endurance.    Rehab identified problem list/impairments: Rehab identified problem list/impairments: weakness, impaired self care skills, impaired balance, impaired endurance, impaired functional mobilty, gait instability, decreased safety awareness, pain    Rehab potential is good.    Activity tolerance: Good    Discharge recommendations: Discharge Facility/Level of Care Needs: LTACH (long-term acute care hospital), nursing facility, skilled     Barriers to discharge:      Equipment recommendations:       GOALS:   Multidisciplinary Problems     Occupational Therapy Goals        Problem: Occupational Therapy Goal    Goal Priority Disciplines Outcome Interventions   Occupational Therapy Goal     OT, PT/OT Ongoing (interventions implemented as appropriate)    Description:  LTGs to be met by 7/29/19  1. Pt will perform toilet t/fs with Mod I  2. Pt will perform LE dressing with Mod I  3. Pt will perform UE dressing with Mod I  4. Pt will perform (B) UE ROM exercises 1 x 20 reps                    Plan:  Patient to be seen 3 x/week to address the above listed problems via self-care/home management, therapeutic activities, therapeutic exercises  Plan of Care expires:  "   Plan of Care reviewed with:           Jennifer Ashley, OT  07/23/2019

## 2019-07-23 NOTE — PROGRESS NOTES
[facility] -   Neurosurgery  Progress Note    Subjective:     History of Present Illness:   No notes on file.    POD 6  Patient doing well this morning  No new complaints  Tolerating diet  Numbness, tingling, weakness and pain much improved  WBC trending up today from 14.6 to 21.87  Patient denies fever, chills, SOB, calf pain   Observed patient ambulating well with PT         Post-Op Info:  Procedure(s) (LRB):  DECOMPRESSION AND FUSION, SPINE, CERVICAL, ANTERIOR APPROACH (N/A)  CORPECTOMY, SPINE, CERVICAL (N/A)  INCISION AND DRAINAGE, ABSCESS (N/A)  APPLICATION, ACELLULAR HUMAN DERMAL ALLOGRAFT (N/A)   6 Days Post-Op      Medications:  Continuous Infusions:  Scheduled Meds:   dexamethasone  4 mg Intravenous Q6H    docusate sodium  100 mg Oral Daily    famotidine  20 mg Oral BID    multivitamin  1 tablet Oral Daily    nicotine  1 patch Transdermal Daily    nozaseptin   Each Nare BID    vancomycin (VANCOCIN) IVPB  1,750 mg Intravenous Q8H     PRN Meds:acetaminophen, aluminum-magnesium hydroxide-simethicone, diazePAM, diphenhydrAMINE, HYDROmorphone, HYDROmorphone, ibuprofen, ondansetron, oxyCODONE, oxyCODONE-acetaminophen, oxyCODONE-acetaminophen, promethazine, ramelteon, senna-docusate 8.6-50 mg       Objective:     Weight: 66.1 kg (145 lb 11.6 oz)  Body mass index is 22.09 kg/m².  Vital Signs (Most Recent):  Temp: 98.1 °F (36.7 °C) (07/23/19 0730)  Pulse: 62 (07/23/19 0730)  Resp: 17 (07/23/19 0730)  BP: 123/70 (07/23/19 0730)  SpO2: 97 % (07/23/19 0730) Vital Signs (24h Range):  Temp:  [97.6 °F (36.4 °C)-98.4 °F (36.9 °C)] 98.1 °F (36.7 °C)  Pulse:  [62-96] 62  Resp:  [16-20] 17  SpO2:  [97 %-98 %] 97 %  BP: (123-140)/(70-87) 123/70     Date 07/23/19 0700 - 07/24/19 0659   Shift 7223-6069 5370-5851 5805-4503 24 Hour Total   INTAKE   P.O. 600   600   Shift Total(mL/kg) 600(9.1)   600(9.1)   OUTPUT   Shift Total(mL/kg)       Weight (kg) 66.1 66.1 66.1 66.1                       CLARISSA Sparrow  CDI  Trach midline  Tolerating diet well  Aspen collar in place  Ambulates well       Significant Labs:  Recent Labs   Lab 07/23/19  0549   *      K 4.0      CO2 25   BUN 17   CREATININE 0.7   CALCIUM 8.6*     Recent Labs   Lab 07/23/19  0549   WBC 21.87*   HGB 12.6*   HCT 38.2*   *     No results for input(s): LABPT, INR, APTT in the last 48 hours.  Microbiology Results (last 7 days)     Procedure Component Value Units Date/Time    Culture, Anaerobe [[id number]] Collected:  07/17/19 1044    Order Status:  Completed Specimen:  Bone from Vertebra Updated:  07/22/19 1118     Anaerobic Culture No anaerobes isolated    Blood culture [[id number]] Collected:  07/15/19 1619    Order Status:  Completed Specimen:  Blood from Peripheral, Upper Arm, Left Updated:  07/21/19 0612     Blood Culture, Routine No growth after 5 days.    Blood culture [[id number]] Collected:  07/15/19 1600    Order Status:  Completed Specimen:  Blood from Peripheral, Upper Arm, Left Updated:  07/21/19 0612     Blood Culture, Routine No growth after 5 days.    Aerobic culture [[id number]]  (Abnormal)  (Susceptibility) Collected:  07/17/19 1044    Order Status:  Completed Specimen:  Bone from Vertebra Updated:  07/20/19 1312     Aerobic Bacterial Culture METHICILLIN RESISTANT STAPHYLOCOCCUS AUREUS  Many      AFB Culture & Smear [[id number]] Collected:  07/17/19 1044    Order Status:  Completed Specimen:  Bone from Vertebra Updated:  07/19/19 0927     AFB Culture & Smear Culture in progress     AFB CULTURE STAIN No acid fast bacilli seen.    Gram stain [[id number]] Collected:  07/17/19 1044    Order Status:  Completed Specimen:  Bone from Vertebra Updated:  07/17/19 2222     Gram Stain Result Rare WBC's      No organisms seen    Fungus culture [[id number]] Collected:  07/17/19 1044    Order Status:  Sent Specimen:  Bone from Vertebra Updated:  07/17/19 2015    Gram stain [[id number]]     Order Status:  Canceled Specimen:  Tissue      Culture, Anaerobic [[id number]]     Order Status:  Canceled Specimen:  Tissue     AFB Culture & Smear [[id number]]     Order Status:  Canceled Specimen:  Tissue     Fungus culture [[id number]]     Order Status:  Canceled Specimen:  Tissue     Tissue culture [[id number]]     Order Status:  Canceled Specimen:  Tissue         CMP:   Recent Labs   Lab 07/23/19  0549   *   CALCIUM 8.6*      K 4.0   CO2 25      BUN 17   CREATININE 0.7     CRP: No results for input(s): CRP in the last 48 hours.  ESR: No results for input(s): POCESR, ERYTHROCYTES in the last 48 hours.  Recent Lab Results       07/23/19  0549   07/22/19 2021        Anion Gap 9       Baso # 0.01       Basophil% 0.0       BUN, Bld 17       Calcium 8.6       Chloride 103       CO2 25       Creatinine 0.7       Differential Method Automated       eGFR if  >60       eGFR if non  >60  Comment:  Calculation used to obtain the estimated glomerular filtration  rate (eGFR) is the CKD-EPI equation.          Eos # 0.0       Eosinophil% 0.0       Glucose 126       Gran # (ANC) 18.7       Gran% 86.9       Hematocrit 38.2       Hemoglobin 12.6       Lymph # 1.9       Lymph% 8.5       MCH 28.1       MCHC 33.0       MCV 85       Mono # 1.3       Mono% 5.9       MPV 9.0       Platelets 393       Potassium 4.0       RBC 4.48       RDW 13.1       Sodium 137       Vancomycin-Trough   13.7     WBC 21.87           All pertinent labs from the last 24 hours have been reviewed.  Significant Diagnostics:  I have reviewed all pertinent imaging results/findings within the past 24 hours.    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 Garcia  WBC elevated today, will order CRP/ESR   Discharge pending Abx and Dr. Garcia recommendations  Plan for Neurosurgery follow up with Neelam Bhat PA-C 2 weeks following surgery date      Zully Paredes PA-C  Neurosurgery  [facility] -

## 2019-07-23 NOTE — ASSESSMENT & PLAN NOTE
Neurosurgery consulted by ED and is aware patient's case.  Patient does have some mild Neuro impairment with unsteady gait and generalized weakness all extremities on my exam and I have notified Neuro surgery at this time.  Awaiting their response/recommendations.  Patient was started on vancomycin in ED will increase coverage with meropenem.  Given his allergy and findings of osteo on MRI.  Consider addition of nafcillin to regimen pending course.    Will consult ID  Neuro checks  Further evaluation/diagnostics/interventions/consults pending course     07/16- will continue vancomycin / and use Aztreonam -will use cultures to guide therapy     7/18 - ID following,  s/p anterior cervical decompression and fusion, POD # 1, continue Vanco and Aztreonam, will use cultures to guide therapy.     7/19 - ID following,  s/p anterior cervical decompression and fusion, POD # 2, continue Vanco and Meropenem, use cultures to guide therapy.     ID following, continue Vancomycin IV, awaiting LTAC placement    07/22-will continue IV Vancomycin , will discuss with pharmacy , needs higher vanco trough   07/23- will continue Vancomycin and rifampin. He needs close follow up .  ESR and CRP are now normal but with MRSA noted in the wound culture and screws present , will prefer to do prolonged therapy

## 2019-07-23 NOTE — PROGRESS NOTES
[facility] Hospital Medicine  Progress Note    Patient Name: Alex Soares  MRN: [id number]  Patient Class: IP- Inpatient   Admission Date: 7/15/2019  Length of Stay: 8 days  Attending Physician: Rony Garcia MD  Primary Care Provider: Simi Macdonald MD        Subjective:     Principal Problem:Cervical spinal cord compression      HPI:  28-year-old male with history of IV drug use, last use of heroin about 2 weeks ago, and tattoos, last recent tattoo 4 weeks ago; presents today as a transfer from outside freestanding ER for neck pain. Patient reports he has been having neck pain over the past month and has been seen outpatient but has not improved.  Patient reports playing with the dog earlier which exacerbated his pain and why he presented to the emergency room.  Patient was evaluated in the ER MRI performed and shows a cervical C6-7 with surrounding vertebral body osteomyelitis. Neurosurgery was consulted by ER.  Vancomycin was started.  Hospital Medicine consulted patient admitted.  Patient seen and examined on arrival.  Cervical collar in place on arrival.  Patient with allergies to cephalosporin reports hives in past.  Patient with increased risk of MRSA/MSSA will cover with both vancomycin and meropenem (given patient allergy).    Overview/Hospital Course:  28-year-old male with history of IV drug use, last use of heroin - a day prior to presentation .  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  No definite cord edema at this time.  Postcontrast imaging may be obtained as needed.  2. Vertebral body osteomyelitis of C5, C6, C7, and T1.  3. Anterior paraspinal phlegmon/abscess extending from mid C2 to mid T1  07/17 -s/p Cervical spine decompression.   As of 7/18/2019, patient seen and examined, s/p anterior cervical decompression and fusion, POD # 1 by Dr Jeong.  Patient  reports feeling well, moving all extremities, cervical collar intact. Vitals signs and labs stable. ID following, awaiting culture results from surgery.   As of 7/19, patient seen and examined, s/p anterior cervical decompression and fusion, POD # 2. Patient feeling well, ambulating in clements without difficult. ID following, continue Vancomycin and Meronem. Awaiting cultures to guide therapy.    As of 7/20, s/p s/p anterior cervical decompression and fusion, POD # 3. ID following, plan on 6-8 weeks of antibiotics preferably IV therapy. Awaiting further recommendations.   As of 7/21, POD # 4  s/p anterior cervical decompression and fusion, will need 6-8 weeks of IV antibiotics, patient has history of IV drug abuse. Will need LTAC placement.      07/22- awaiting LTAC placement , still on IV Vancomycin .  07/23- 28 year old man s/p . Cervical corpectomy C6   2. Cervical corpectomy C7   3. Placement of anterior plate with screws C5-T1   4. Cervical arthrodesis C5-T1  5. Placement of PEEK interbody cage    On 07/17.  Wound cultures - MRSA  He wants oral medications and not IV medications       Interval History:   07/23- 28 year old man s/p . Cervical corpectomy C6   2. Cervical corpectomy C7   3. Placement of anterior plate with screws C5-T1   4. Cervical arthrodesis C5-T1  5. Placement of PEEK interbody cage    On 07/17.  Wound cultures - MRSA  He wants oral medications and not IV medications         Review of Systems   Constitutional: Negative for chills, diaphoresis, fatigue and fever.   HENT: Negative for congestion, ear discharge, rhinorrhea, sinus pressure, sore throat and trouble swallowing.    Eyes: Negative for discharge and visual disturbance.   Respiratory: Negative for apnea, cough, choking, chest tightness, shortness of breath, wheezing and stridor.    Cardiovascular: Negative for chest pain, palpitations and leg swelling.   Gastrointestinal: Negative for abdominal distention, abdominal pain, diarrhea, nausea  and vomiting.   Endocrine: Negative for cold intolerance and heat intolerance.   Genitourinary: Negative for dysuria, frequency and hematuria.   Musculoskeletal: Negative for arthralgias, back pain, myalgias and neck pain.   Skin: Negative for pallor and rash.   Neurological: Negative for dizziness, seizures, syncope, weakness and headaches.   Psychiatric/Behavioral: Negative for agitation, confusion and sleep disturbance.     Objective:     Vital Signs (Most Recent):  Temp: 98.1 °F (36.7 °C) (07/23/19 1625)  Pulse: 101 (07/23/19 1625)  Resp: 18 (07/23/19 1625)  BP: (!) 141/88 (07/23/19 1625)  SpO2: 96 % (07/23/19 1625) Vital Signs (24h Range):  Temp:  [97.6 °F (36.4 °C)-98.4 °F (36.9 °C)] 98.1 °F (36.7 °C)  Pulse:  [] 101  Resp:  [16-18] 18  SpO2:  [96 %-98 %] 96 %  BP: (123-141)/(70-88) 141/88     Weight: 66.1 kg (145 lb 11.6 oz)  Body mass index is 22.09 kg/m².    Intake/Output Summary (Last 24 hours) at 7/23/2019 1821  Last data filed at 7/23/2019 1436  Gross per 24 hour   Intake 1460 ml   Output --   Net 1460 ml      Physical Exam   Constitutional: He is oriented to person, place, and time. No distress. Cervical collar in place.   HENT:   Mouth/Throat: No oropharyngeal exudate.   Eyes: Right eye exhibits no discharge. Left eye exhibits no discharge.   Neck: No JVD present.   Cardiovascular: Exam reveals no gallop and no friction rub.   No murmur heard.  Pulmonary/Chest: No stridor. No respiratory distress. He has no wheezes. He has no rales. He exhibits no tenderness.   Abdominal: He exhibits no distension and no mass. There is no tenderness. There is no rebound and no guarding. No hernia.   Musculoskeletal: He exhibits no edema or deformity.   Neurological: He is alert and oriented to person, place, and time.   Skin: Skin is warm and dry. Capillary refill takes less than 2 seconds. He is not diaphoretic.   Nursing note and vitals reviewed.      Significant Labs: Blood Culture: No results for input(s):  LABBLOO in the last 48 hours.  All pertinent labs within the past 24 hours have been reviewed.    Significant Imaging: I have reviewed all pertinent imaging results/findings within the past 24 hours.  I have reviewed and interpreted all pertinent imaging results/findings within the past 24 hours.      Assessment/Plan:      * Cervical spinal cord compression  S/P cervical spine decompression       Abscess in epidural space of cervical spine      07/17- Incision and drainage     ID following, plan on 6-8 weeks of antibiotic therapy. Continue Vancomycin IV for now.  Pt has history IV drug abuse, will need LTAC placement     Sepsis  Resolved         Elevated BP without diagnosis of hypertension  Monitor consider daily therapy pending course  Further evaluation/diagnostics/interventions/consults pending course         Hypokalemia  Resolved       IV drug user  Vancomycin.   ID following   Echo complete   The patient was counseled on the dangers of use and encouraged to quit.  Further evaluation/diagnostics/interventions/consults pending course     Needs out patient drug cessation counseling.    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  Patient does have some mild Neuro impairment with unsteady gait and generalized weakness all extremities on my exam and I have notified Neuro surgery at this time.  Awaiting their response/recommendations.  Patient was started on vancomycin in ED will increase coverage with meropenem.  Given his allergy and findings of osteo on MRI.  Consider addition of nafcillin to regimen pending course.    Will consult ID  Neuro checks  Further evaluation/diagnostics/interventions/consults pending course     07/16- will continue vancomycin / and use Aztreonam -will use cultures to guide therapy     7/18 - ID following,  s/p  anterior cervical decompression and fusion, POD # 1, continue Vanco and Aztreonam, will use cultures to guide therapy.     7/19 - ID following,  s/p anterior cervical decompression and fusion, POD # 2, continue Vanco and Meropenem, use cultures to guide therapy.     ID following, continue Vancomycin IV, awaiting LTAC placement    07/22-will continue IV Vancomycin , will discuss with pharmacy , needs higher vanco trough   07/23- will continue Vancomycin and rifampin. He needs close follow up .  ESR and CRP are now normal but with MRSA noted in the wound culture and screws present , will prefer to do prolonged therapy        Spondylodiscitis  Vanc/meropenum  Neurosurgery managing.   Cervical collar inplace  Further evaluation/diagnostics/interventions/consults pending course       07/22- will plan to complete 6 weeks of IV Vancomycin, needs close follow up      07/23- will add rifampin , continue IV Vancomycin and will plan to complete 6 weeks of IV therapy .              VTE Risk Mitigation (From admission, onward)        Ordered     IP VTE HIGH RISK PATIENT  Once      07/17/19 1247     Place sequential compression device  Until discontinued      07/17/19 1247                Rony Garcia MD  Department of Hospital Medicine   [facility]

## 2019-07-23 NOTE — PT/OT/SLP PROGRESS
Physical Therapy  Treatment    Alex Soares   MRN: [id number]   Admitting Diagnosis: Cervical spinal cord compression    PT Received On: 07/23/19  PT Start Time: 0900     PT Stop Time: 0920    PT Total Time (min): 20 min       Billable Minutes:  Gait Training 20    Treatment Type: Treatment  PT/PTA: PT     PTA Visit Number: 1       General Precautions: Standard, fall, contact  Orthopedic Precautions: (NO LIFTING >5#)   Braces: Cervical collar         Subjective:  Communicated with NURSE BARBARA AND Epic CHART REVIEW prior to session.   PT AGREED TO TX     Pain/Comfort  Pain Rating 1: 0/10  Pain Rating Post-Intervention 1: 0/10    Objective:   Patient found with: peripheral IV, cervical collar    Functional Mobility:  PT MET IN RM SEATED EOB. PT STOOD WITH NO AD AND GT TRAINED X 700' WITH ATAXIC GT AND 1 LOB HOWEVER ABLE TO RIGHT SELF. PT RETURNED TO RM T/F TO EOB AND LEFT SEATED WITH ALL NEEDS MET AND CALL BELL IN REACH.     AM-PAC 6 CLICK MOBILITY  How much help from another person does this patient currently need?   1 = Unable, Total/Dependent Assistance  2 = A lot, Maximum/Moderate Assistance  3 = A little, Minimum/Contact Guard/Supervision  4 = None, Modified [place]/Independent    Turning over in bed (including adjusting bedclothes, sheets and blankets)?: 4  Sitting down on and standing up from a chair with arms (e.g., wheelchair, bedside commode, etc.): 4  Moving from lying on back to sitting on the side of the bed?: 4  Moving to and from a bed to a chair (including a wheelchair)?: 4  Need to walk in hospital room?: 4  Climbing 3-5 steps with a railing?: 3  Basic Mobility Total Score: 23    AM-PAC Raw Score CMS G-Code Modifier Level of Impairment Assistance   6 % Total / Unable   7 - 9 CM 80 - 100% Maximal Assist   10 - 14 CL 60 - 80% Moderate Assist   15 - 19 CK 40 - 60% Moderate Assist   20 - 22 CJ 20 - 40% Minimal Assist   23 CI 1-20% SBA / CGA   24 CH 0% Independent/ Mod I     Patient left  SEATED EOB with all lines intact and call button in reach.    Assessment:  PT CYNTHIA GT TRAINING WITH SBA HOWEVER PT CONT TO BE UNSTEADY WITH GT    Rehab identified problem list/impairments: Rehab identified problem list/impairments: weakness, impaired balance, impaired functional mobilty, impaired endurance, gait instability, decreased lower extremity function, orthopedic precautions    Rehab potential is excellent.    Activity tolerance: Good    Discharge recommendations: Discharge Facility/Level of Care Needs: outpatient PT     Barriers to discharge:      Equipment recommendations: Equipment Needed After Discharge: none     GOALS:   Multidisciplinary Problems     Physical Therapy Goals        Problem: Physical Therapy Goal    Goal Priority Disciplines Outcome Goal Variances Interventions   Physical Therapy Goal     PT, PT/OT Ongoing (interventions implemented as appropriate)     Description:  PT WILL BE SEEN FOR P.T. FOR A MIN OF 5 OUT OF 7 DAYS A WEEK  LT19  1. PT WILL COMPLETE BED MOBILITY IND  2. PT WILL T/F TO CHAIR IND  3. PT WILL GT TRAIN X 700' IND   4. PT WILL COMPLETE B LE TE X 20 REPS  5. PT WILL ASCEND AND DESCEND 1 FLIGHT OF STEPS MOD I                       PLAN:    Patient to be seen to address the above listed problems via gait training, therapeutic activities, therapeutic exercises  Plan of Care expires: 19  Plan of Care reviewed with: patient         Abby Reich, PT  2019

## 2019-07-23 NOTE — PLAN OF CARE
Problem: Adult Inpatient Plan of Care  Goal: Plan of Care Review  Outcome: Ongoing (interventions implemented as appropriate)  Fall precautions implemented. Pt remained free from falls/injuries.  VSS. IV ABx infused per orders. Contact precautions maintained. C collar remained on pt at all times.  Pain adequately controlled with PRN pain meds. Neuro checks completed q4.  Safety precautions implemented. Chart reviewed. Will continue to monitor.

## 2019-07-23 NOTE — PROGRESS NOTES
Pharmacokinetic Assessment Follow Up: IV Vancomycin    Vancomycin serum concentration assessment(s):    The trough level was drawned correctly and can be used to guide therapy at this time.    Vancomycin Regimen Plan:    Change regimen to Vancomycin 1750 mg IV every 8hour with next serum trough concentration measured at 12:30 prior to next dose on 7/23     Pharmacy will continue to follow and monitor vancomycin.    Please contact pharmacy at extension [phone] for questions regarding this assessment.    Thank you for the consult,   Mazin Mcnally     Patient brief summary:  Alex Soares is a 28 y.o. male initiated on antimicrobial therapy with IV Vancomycin for treatment of suspected bone/joint    The patient received a loading dose, followed by the current treatment regimen: vancomycin 1500 mg IV every 8 hours    Drug Allergies:   Review of patient's allergies indicates:   Allergen Reactions    Ceclor [cefaclor] Hives    Cefixime Hives    Cefzil [cefprozil] Hives       Actual Body Weight:   66.1kg    Renal Function:   Estimated Creatinine Clearance: 146.9 mL/min (based on SCr of 0.7 mg/dL).,     Dialysis Method (if applicable):  N/A     CBC (last 72 hours):  Recent Labs   Lab Result Units 07/20/19  0716 07/21/19  0342   WBC K/uL 13.85* 14.60*   Hemoglobin g/dL 13.1* 12.8*   Hematocrit % 40.1 37.5*   Platelets K/uL 164 378*   Gran% % 87.3* 86.3*   Lymph% % 10.6* 10.2*   Mono% % 2.4* 4.0   Eosinophil% % 0.0 0.0   Basophil% % 0.1 0.0   Differential Method  Automated Automated       Metabolic Panel (last 72 hours):  Recent Labs   Lab Result Units 07/20/19  0513 07/21/19  0342   Sodium mmol/L 140 139   Potassium mmol/L 4.2 3.9   Chloride mmol/L 104 105   CO2 mmol/L 24 24   Glucose mg/dL 115* 136*   BUN, Bld mg/dL 12 16   Creatinine mg/dL 0.7 0.7       Vancomycin Administrations:  vancomycin given in the last 96 hours                     vancomycin 1.5 g in 5 % dextrose 250 mL IVPB (mg) 1,500 mg New Bag 07/22/19 9842      1,500 mg New Bag  1158     1,500 mg New Bag  0404     1,500 mg New Bag 07/21/19 1917     1,500 mg New Bag  1105    vancomycin (VANCOCIN) 1,250 mg in dextrose 5 % 250 mL IVPB (mg) 1,250 mg New Bag 07/21/19 0500     1,250 mg New Bag 07/20/19 1918     1,250 mg New Bag  1239     1,250 mg New Bag  0303     1,250 mg New Bag 07/19/19 1948                      Drug levels (last 3 results):  Recent Labs   Lab Result Units 07/20/19  1029 07/21/19  0342 07/22/19  0345 07/22/19 2021   Vancomycin, Random ug/mL  --  10.5  --   --    Vancomycin-Trough ug/mL 14.9  --  13.5 13.7       Microbiologic Results:  Microbiology Results (last 7 days)       Procedure Component Value Units Date/Time    Culture, Anaerobe [[id number]] Collected:  07/17/19 1044    Order Status:  Completed Specimen:  Bone from Vertebra Updated:  07/22/19 1118     Anaerobic Culture No anaerobes isolated    Blood culture [[id number]] Collected:  07/15/19 1619    Order Status:  Completed Specimen:  Blood from Peripheral, Upper Arm, Left Updated:  07/21/19 0612     Blood Culture, Routine No growth after 5 days.    Blood culture [[id number]] Collected:  07/15/19 1600    Order Status:  Completed Specimen:  Blood from Peripheral, Upper Arm, Left Updated:  07/21/19 0612     Blood Culture, Routine No growth after 5 days.    Aerobic culture [[id number]]  (Abnormal)  (Susceptibility) Collected:  07/17/19 1044    Order Status:  Completed Specimen:  Bone from Vertebra Updated:  07/20/19 1312     Aerobic Bacterial Culture METHICILLIN RESISTANT STAPHYLOCOCCUS AUREUS  Many      AFB Culture & Smear [[id number]] Collected:  07/17/19 1044    Order Status:  Completed Specimen:  Bone from Vertebra Updated:  07/19/19 0927     AFB Culture & Smear Culture in progress     AFB CULTURE STAIN No acid fast bacilli seen.    Gram stain [[id number]] Collected:  07/17/19 1044    Order Status:  Completed Specimen:  Bone from Vertebra Updated:  07/17/19 2222     Gram Stain Result Rare WBC's      No  organisms seen    Fungus culture [[id number]] Collected:  07/17/19 1044    Order Status:  Sent Specimen:  Bone from Vertebra Updated:  07/17/19 2015    Gram stain [[id number]]     Order Status:  Canceled Specimen:  Tissue     Culture, Anaerobic [[id number]]     Order Status:  Canceled Specimen:  Tissue     AFB Culture & Smear [[id number]]     Order Status:  Canceled Specimen:  Tissue     Fungus culture [[id number]]     Order Status:  Canceled Specimen:  Tissue     Tissue culture [[id number]]     Order Status:  Canceled Specimen:  Tissue

## 2019-07-23 NOTE — PLAN OF CARE
Problem: Physical Therapy Goal  Goal: Physical Therapy Goal  PT WILL BE SEEN FOR P.T. FOR A MIN OF 5 OUT OF 7 DAYS A WEEK  LT19  1. PT WILL COMPLETE BED MOBILITY IND  2. PT WILL T/F TO CHAIR IND  3. PT WILL GT TRAIN X 700' IND   4. PT WILL COMPLETE B LE TE X 20 REPS  5. PT WILL ASCEND AND DESCEND 1 FLIGHT OF STEPS MOD I      Outcome: Ongoing (interventions implemented as appropriate)  PT GT TRAINED X 700' WITH NO AD AND 1 LOB HOWEVER ABLE TO RIGHT SELF IND

## 2019-07-23 NOTE — SUBJECTIVE & OBJECTIVE
Interval History:   07/23- 28 year old man s/p . Cervical corpectomy C6   2. Cervical corpectomy C7   3. Placement of anterior plate with screws C5-T1   4. Cervical arthrodesis C5-T1  5. Placement of PEEK interbody cage    On 07/17.  Wound cultures - MRSA  He wants oral medications and not IV medications         Review of Systems   Constitutional: Negative for chills, diaphoresis, fatigue and fever.   HENT: Negative for congestion, ear discharge, rhinorrhea, sinus pressure, sore throat and trouble swallowing.    Eyes: Negative for discharge and visual disturbance.   Respiratory: Negative for apnea, cough, choking, chest tightness, shortness of breath, wheezing and stridor.    Cardiovascular: Negative for chest pain, palpitations and leg swelling.   Gastrointestinal: Negative for abdominal distention, abdominal pain, diarrhea, nausea and vomiting.   Endocrine: Negative for cold intolerance and heat intolerance.   Genitourinary: Negative for dysuria, frequency and hematuria.   Musculoskeletal: Negative for arthralgias, back pain, myalgias and neck pain.   Skin: Negative for pallor and rash.   Neurological: Negative for dizziness, seizures, syncope, weakness and headaches.   Psychiatric/Behavioral: Negative for agitation, confusion and sleep disturbance.     Objective:     Vital Signs (Most Recent):  Temp: 98.1 °F (36.7 °C) (07/23/19 1625)  Pulse: 101 (07/23/19 1625)  Resp: 18 (07/23/19 1625)  BP: (!) 141/88 (07/23/19 1625)  SpO2: 96 % (07/23/19 1625) Vital Signs (24h Range):  Temp:  [97.6 °F (36.4 °C)-98.4 °F (36.9 °C)] 98.1 °F (36.7 °C)  Pulse:  [] 101  Resp:  [16-18] 18  SpO2:  [96 %-98 %] 96 %  BP: (123-141)/(70-88) 141/88     Weight: 66.1 kg (145 lb 11.6 oz)  Body mass index is 22.09 kg/m².    Intake/Output Summary (Last 24 hours) at 7/23/2019 1821  Last data filed at 7/23/2019 1436  Gross per 24 hour   Intake 1460 ml   Output --   Net 1460 ml      Physical Exam   Constitutional: He is oriented to person,  place, and time. No distress. Cervical collar in place.   HENT:   Mouth/Throat: No oropharyngeal exudate.   Eyes: Right eye exhibits no discharge. Left eye exhibits no discharge.   Neck: No JVD present.   Cardiovascular: Exam reveals no gallop and no friction rub.   No murmur heard.  Pulmonary/Chest: No stridor. No respiratory distress. He has no wheezes. He has no rales. He exhibits no tenderness.   Abdominal: He exhibits no distension and no mass. There is no tenderness. There is no rebound and no guarding. No hernia.   Musculoskeletal: He exhibits no edema or deformity.   Neurological: He is alert and oriented to person, place, and time.   Skin: Skin is warm and dry. Capillary refill takes less than 2 seconds. He is not diaphoretic.   Nursing note and vitals reviewed.      Significant Labs: Blood Culture: No results for input(s): LABBLOO in the last 48 hours.  All pertinent labs within the past 24 hours have been reviewed.    Significant Imaging: I have reviewed all pertinent imaging results/findings within the past 24 hours.  I have reviewed and interpreted all pertinent imaging results/findings within the past 24 hours.

## 2019-07-24 LAB — VANCOMYCIN SERPL-MCNC: 12.7 UG/ML

## 2019-07-24 PROCEDURE — 25000003 PHARM REV CODE 250: Performed by: INTERNAL MEDICINE

## 2019-07-24 PROCEDURE — 25000003 PHARM REV CODE 250: Performed by: PHYSICIAN ASSISTANT

## 2019-07-24 PROCEDURE — 97530 THERAPEUTIC ACTIVITIES: CPT

## 2019-07-24 PROCEDURE — 97116 GAIT TRAINING THERAPY: CPT

## 2019-07-24 PROCEDURE — S4991 NICOTINE PATCH NONLEGEND: HCPCS | Performed by: PHYSICIAN ASSISTANT

## 2019-07-24 PROCEDURE — 36415 COLL VENOUS BLD VENIPUNCTURE: CPT

## 2019-07-24 PROCEDURE — 80202 ASSAY OF VANCOMYCIN: CPT

## 2019-07-24 PROCEDURE — 11000001 HC ACUTE MED/SURG PRIVATE ROOM

## 2019-07-24 PROCEDURE — 63600175 PHARM REV CODE 636 W HCPCS: Performed by: INTERNAL MEDICINE

## 2019-07-24 RX ORDER — OXYCODONE AND ACETAMINOPHEN 7.5; 325 MG/1; MG/1
1 TABLET ORAL EVERY 4 HOURS PRN
Status: DISCONTINUED | OUTPATIENT
Start: 2019-07-24 | End: 2019-07-25 | Stop reason: HOSPADM

## 2019-07-24 RX ORDER — VANCOMYCIN HCL IN 5 % DEXTROSE 1.5G/250ML
1500 PLASTIC BAG, INJECTION (ML) INTRAVENOUS
Status: DISCONTINUED | OUTPATIENT
Start: 2019-07-24 | End: 2019-07-25

## 2019-07-24 RX ADMIN — VANCOMYCIN HYDROCHLORIDE 1750 MG: 750 INJECTION, POWDER, LYOPHILIZED, FOR SOLUTION INTRAVENOUS at 09:07

## 2019-07-24 RX ADMIN — FAMOTIDINE 20 MG: 20 TABLET ORAL at 08:07

## 2019-07-24 RX ADMIN — THERA TABS 1 TABLET: TAB at 08:07

## 2019-07-24 RX ADMIN — VANCOMYCIN HYDROCHLORIDE 1500 MG: 100 INJECTION, POWDER, LYOPHILIZED, FOR SOLUTION INTRAVENOUS at 07:07

## 2019-07-24 RX ADMIN — RAMELTEON 8 MG: 8 TABLET, FILM COATED ORAL at 09:07

## 2019-07-24 RX ADMIN — RIFAMPIN 600 MG: 300 CAPSULE ORAL at 08:07

## 2019-07-24 RX ADMIN — OXYCODONE HYDROCHLORIDE 15 MG: 5 TABLET ORAL at 06:07

## 2019-07-24 RX ADMIN — Medication 1 PATCH: at 08:07

## 2019-07-24 RX ADMIN — OXYCODONE HYDROCHLORIDE 15 MG: 5 TABLET ORAL at 09:07

## 2019-07-24 RX ADMIN — OXYCODONE HYDROCHLORIDE 15 MG: 5 TABLET ORAL at 05:07

## 2019-07-24 RX ADMIN — OXYCODONE HYDROCHLORIDE 15 MG: 5 TABLET ORAL at 12:07

## 2019-07-24 RX ADMIN — DOCUSATE SODIUM 100 MG: 100 CAPSULE, LIQUID FILLED ORAL at 08:07

## 2019-07-24 RX ADMIN — OXYCODONE HYDROCHLORIDE 15 MG: 5 TABLET ORAL at 02:07

## 2019-07-24 NOTE — PLAN OF CARE
Problem: Physical Therapy Goal  Goal: Physical Therapy Goal  PT WILL BE SEEN FOR P.T. FOR A MIN OF 5 OUT OF 7 DAYS A WEEK  LT19  1. PT WILL COMPLETE BED MOBILITY IND  2. PT WILL T/F TO CHAIR IND  3. PT WILL GT TRAIN X 700' IND   4. PT WILL COMPLETE B LE TE X 20 REPS  5. PT WILL ASCEND AND DESCEND 1 FLIGHT OF STEPS MOD I      Outcome: Ongoing (interventions implemented as appropriate)  PT GT TRAINED 700' X1 AND 1X200' WITH NO AD WITH ATAXIC GT AND 2 LOB WITH S

## 2019-07-24 NOTE — PT/OT/SLP PROGRESS
Physical Therapy  Treatment    Alex Soares   MRN: [id number]   Admitting Diagnosis: Cervical spinal cord compression    PT Received On: 07/24/19  PT Start Time: 0856     PT Stop Time: 0920    PT Total Time (min): 24 min       Billable Minutes:  Gait Training 14 and Therapeutic Activity 10    Treatment Type: Treatment  PT/PTA: PT     PTA Visit Number: 1       General Precautions: Standard, fall, contact  Orthopedic Precautions: (NO LIFTING >5#)   Braces: Cervical collar         Subjective:  Communicated with NURSE JIMENEZ AND Epic CHART REVIEW prior to session.  PT MET IN  AGREED TO TX     Pain/Comfort  Pain Rating 1: 4/10  Location 1: neck  Pain Addressed 1: Pre-medicate for activity    Objective:   Patient found with: peripheral IV, cervical collar    Functional Mobility:  PT MET IN  SUP.SIT EOB IND. PT STOOD WITH NO AD AND S FOR GT WITH CERVICAL COLLAR ON. PT GT TRAINED X 700' X 1 AND RETURNED TO RM T/F TO BEDSIDE FOR REST. PT EDUCATED ON LE TE TO COMPLETED SUP IN BED FOR STRENGTHENING. PT STOOD FOR 2ND GT TRIAL WITH NO AD AND S WITH LOB TO STAIR WELL FOR STAIR TRAINING PT ASCENDED AND DESCENDED 1 FLIGHT OF STEPS X 3 TRIALS WITH 1 RAILING AND STEP TO TRAINING WITH CGA. PT WITH INC FATIGUE AFTER STAIR TRAINING. PT RETURNED TO RM T/F TO EOB AND SUP IN BED IND. PT LEFT WITH ALL NEEDS MET     AM-PAC 6 CLICK MOBILITY  How much help from another person does this patient currently need?   1 = Unable, Total/Dependent Assistance  2 = A lot, Maximum/Moderate Assistance  3 = A little, Minimum/Contact Guard/Supervision  4 = None, Modified [place]/Independent    Turning over in bed (including adjusting bedclothes, sheets and blankets)?: 4  Sitting down on and standing up from a chair with arms (e.g., wheelchair, bedside commode, etc.): 4  Moving from lying on back to sitting on the side of the bed?: 4  Moving to and from a bed to a chair (including a wheelchair)?: 4  Need to walk in hospital room?: 4  Climbing 3-5  steps with a railing?: 3  Basic Mobility Total Score: 23    AM-PAC Raw Score CMS G-Code Modifier Level of Impairment Assistance   6 % Total / Unable   7 - 9 CM 80 - 100% Maximal Assist   10 - 14 CL 60 - 80% Moderate Assist   15 - 19 CK 40 - 60% Moderate Assist   20 - 22 CJ 20 - 40% Minimal Assist   23 CI 1-20% SBA / CGA   24 CH 0% Independent/ Mod I     Patient left supine with call button in reach.    Assessment:  PT PROGRESSING WITH  GT HOWEVER CONT TO HAVE ATAXIC GT AND INC FATIGUE NOTED WITH GROSS FUNC MOBILITY .    Rehab identified problem list/impairments: Rehab identified problem list/impairments: weakness, impaired endurance, impaired balance, gait instability, orthopedic precautions, pain, impaired functional mobilty, decreased coordination    Rehab potential is excellent.    Activity tolerance: Good    Discharge recommendations: Discharge Facility/Level of Care Needs: outpatient PT     Barriers to discharge:      Equipment recommendations: Equipment Needed After Discharge: none     GOALS:   Multidisciplinary Problems     Physical Therapy Goals        Problem: Physical Therapy Goal    Goal Priority Disciplines Outcome Goal Variances Interventions   Physical Therapy Goal     PT, PT/OT Ongoing (interventions implemented as appropriate)     Description:  PT WILL BE SEEN FOR P.T. FOR A MIN OF 5 OUT OF 7 DAYS A WEEK  LT19  1. PT WILL COMPLETE BED MOBILITY IND  2. PT WILL T/F TO CHAIR IND  3. PT WILL GT TRAIN X 700' IND   4. PT WILL COMPLETE B LE TE X 20 REPS  5. PT WILL ASCEND AND DESCEND 1 FLIGHT OF STEPS MOD I                       PLAN:    Patient to be seen to address the above listed problems via gait training, therapeutic activities, therapeutic exercises  Plan of Care expires: 19  Plan of Care reviewed with: patient, mother    Abby Reich, PT  2019

## 2019-07-24 NOTE — PHYSICIAN QUERY
PT Name: Alex Soares  MR #: [id number]    Physician Query Form - Cause and Effect Relationship Clarification      CDS/: [place]               Contact information: [phone]    This form is a permanent document in the medical record.     Query Date: July 24, 2019    By submitting this query, we are merely seeking further clarification of documentation. Please utilize your independent clinical judgment when addressing the question(s) below.    The Medical record contains the following:  Supporting Clinical Findings   Location in record             Sepsis  Related to above.   Continue vanc and meropenem  Id consult  Neurosurgery consult  Further evaluation/diagnostics/interventions/consults pending course     Admission Date: 7/15/2019    history of IV drug use, last use of heroin about 2 weeks ago, and tattoos, last recent tattoo 4 weeks ago; presents today as a transfer from outside freestanding ER for neck pain                                                                                                                                                                       Vital Signs (24h Range):  Temp:  [98.3 °F (36.8 °C)-101.2 °F (38.4 °C)] 99.7 °F (37.6 °C)  Pulse:  [] 93  Resp:  [13-20] 16  SpO2:  [95 %-100 %] 95 %  BP: (127-164)/(73-90) 131/79          H/P 7/15 ([facility])       07/23- will continue Vancomycin and rifampin                ESR and CRP are now normal but with MRSA noted in the wound culture and screws present , will prefer to do prolonged therapy                                                                           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  Patient does have some mild Neuro impairment with unsteady gait and generalized weakness all extremities on my exam and I have notified Neuro surgery at this time.  Awaiting their  response/recommendations.  Patient was started on vancomycin in ED will increase coverage with meropenem.  Given his allergy and findings of osteo on MRI.  Consider addition of nafcillin to regimen pending course                                                                                                        Progress note [facility] ([facility]) 7/23         Provider, please clarify if there is any correlation between _____      Sepsis____________________ and ____MRSA______________.           Are the conditions:      [ x ] Due to or associated with each other   [  ] Unrelated to each other   [  ] Other (Please Specify): _________________________   [  ] Clinically Undetermined

## 2019-07-24 NOTE — PROGRESS NOTES
Pharmacokinetic Assessment Follow Up: IV Vancomycin    Vancomycin serum concentration assessment(s):    The random level was drawned correctly and can be used to guide therapy at this time.    Vancomycin Regimen Plan:    Change regimen to Vancomycin 1500 mg IV every 8hour with next serum trough concentration measured at 0130 prior to 3rd dose on 7/25     Pharmacy will continue to follow and monitor vancomycin.    Please contact pharmacy at extension [phone] for questions regarding this assessment.    Thank you for the consult,   Deana Reese     Patient brief summary:  Alex Soares is a 28 y.o. male initiated on antimicrobial therapy with IV Vancomycin for treatment of suspected bone/joint    The patient received a loading dose, followed by the current treatment regimen: vancomycin 1500 mg IV every 8 hours    Drug Allergies:   Review of patient's allergies indicates:   Allergen Reactions    Ceclor [cefaclor] Hives    Cefixime Hives    Cefzil [cefprozil] Hives       Actual Body Weight:   66.1kg    Renal Function:   Estimated Creatinine Clearance: 146.9 mL/min (based on SCr of 0.7 mg/dL).,     Dialysis Method (if applicable):  N/A    CBC (last 72 hours):  Recent Labs   Lab Result Units 07/23/19  0549   WBC K/uL 21.87*   Hemoglobin g/dL 12.6*   Hematocrit % 38.2*   Platelets K/uL 393*   Gran% % 86.9*   Lymph% % 8.5*   Mono% % 5.9   Eosinophil% % 0.0   Basophil% % 0.0   Differential Method  Automated       Metabolic Panel (last 72 hours):  Recent Labs   Lab Result Units 07/23/19  0549   Sodium mmol/L 137   Potassium mmol/L 4.0   Chloride mmol/L 103   CO2 mmol/L 25   Glucose mg/dL 126*   BUN, Bld mg/dL 17   Creatinine mg/dL 0.7       Vancomycin Administrations:  vancomycin given in the last 96 hours                     vancomycin 1.75 g in 5 % dextrose 500 mL IVPB (mg) 1,750 mg New Bag 07/24/19 0947    vancomycin 1.75 g in 5 % dextrose 500 mL IVPB (mg) 1,750 mg New Bag 07/23/19 2114     1,750 mg New Bag  1338      1,750 mg New Bag  0429    vancomycin 1.5 g in 5 % dextrose 250 mL IVPB (mg) 1,500 mg New Bag 07/22/19 2050     1,500 mg New Bag  1158     1,500 mg New Bag  0404     1,500 mg New Bag 07/21/19 1917     1,500 mg New Bag  1105                      Drug levels (last 3 results):  Recent Labs   Lab Result Units 07/22/19  2021 07/23/19  1231 07/23/19  2104 07/24/19  0519   Vancomycin, Random ug/mL  --   --   --  12.7   Vancomycin-Trough ug/mL 13.7 17.5 21.9  --        Microbiologic Results:  Microbiology Results (last 7 days)       Procedure Component Value Units Date/Time    Culture, Anaerobe [[id number]] Collected:  07/17/19 1044    Order Status:  Completed Specimen:  Bone from Vertebra Updated:  07/22/19 1118     Anaerobic Culture No anaerobes isolated    Blood culture [[id number]] Collected:  07/15/19 1619    Order Status:  Completed Specimen:  Blood from Peripheral, Upper Arm, Left Updated:  07/21/19 0612     Blood Culture, Routine No growth after 5 days.    Blood culture [[id number]] Collected:  07/15/19 1600    Order Status:  Completed Specimen:  Blood from Peripheral, Upper Arm, Left Updated:  07/21/19 0612     Blood Culture, Routine No growth after 5 days.    Aerobic culture [[id number]]  (Abnormal)  (Susceptibility) Collected:  07/17/19 1044    Order Status:  Completed Specimen:  Bone from Vertebra Updated:  07/20/19 1312     Aerobic Bacterial Culture METHICILLIN RESISTANT STAPHYLOCOCCUS AUREUS  Many      AFB Culture & Smear [[id number]] Collected:  07/17/19 1044    Order Status:  Completed Specimen:  Bone from Vertebra Updated:  07/19/19 0927     AFB Culture & Smear Culture in progress     AFB CULTURE STAIN No acid fast bacilli seen.    Gram stain [[id number]] Collected:  07/17/19 1044    Order Status:  Completed Specimen:  Bone from Vertebra Updated:  07/17/19 2222     Gram Stain Result Rare WBC's      No organisms seen    Fungus culture [[id number]] Collected:  07/17/19 1044    Order Status:  Sent Specimen:  Bone  from Vertebra Updated:  07/17/19 2015    Gram stain [[id number]]     Order Status:  Canceled Specimen:  Tissue     Culture, Anaerobic [[id number]]     Order Status:  Canceled Specimen:  Tissue     AFB Culture & Smear [[id number]]     Order Status:  Canceled Specimen:  Tissue     Fungus culture [[id number]]     Order Status:  Canceled Specimen:  Tissue     Tissue culture [[id number]]     Order Status:  Canceled Specimen:  Tissue

## 2019-07-24 NOTE — PROGRESS NOTES
Pharmacokinetic Assessment Follow Up: IV Vancomycin    Vancomycin serum concentration assessment(s):    The trough level was drawned correctly and can be used to guide therapy at this time.    Vancomycin Regimen Plan:    Change regimen to Vancomycin 1500 mg IV every 8hour with next serum trough concentration measured at 04:30 prior to next dose on 7/24     Pharmacy will continue to follow and monitor vancomycin.    Please contact pharmacy at extension [phone] for questions regarding this assessment.    Thank you for the consult,   Mazin Mcnally     Patient brief summary:  Alex Soares is a 28 y.o. male initiated on antimicrobial therapy with IV Vancomycin for treatment of suspected bone/joint      Drug Allergies:   Review of patient's allergies indicates:   Allergen Reactions    Ceclor [cefaclor] Hives    Cefixime Hives    Cefzil [cefprozil] Hives       Actual Body Weight:   66.1kg    Renal Function:   Estimated Creatinine Clearance: 146.9 mL/min (based on SCr of 0.7 mg/dL).,     Dialysis Method (if applicable):  N/A     CBC (last 72 hours):  Recent Labs   Lab Result Units 07/21/19  0342 07/23/19  0549   WBC K/uL 14.60* 21.87*   Hemoglobin g/dL 12.8* 12.6*   Hematocrit % 37.5* 38.2*   Platelets K/uL 378* 393*   Gran% % 86.3* 86.9*   Lymph% % 10.2* 8.5*   Mono% % 4.0 5.9   Eosinophil% % 0.0 0.0   Basophil% % 0.0 0.0   Differential Method  Automated Automated       Metabolic Panel (last 72 hours):  Recent Labs   Lab Result Units 07/21/19  0342 07/23/19  0549   Sodium mmol/L 139 137   Potassium mmol/L 3.9 4.0   Chloride mmol/L 105 103   CO2 mmol/L 24 25   Glucose mg/dL 136* 126*   BUN, Bld mg/dL 16 17   Creatinine mg/dL 0.7 0.7       Vancomycin Administrations:  vancomycin given in the last 96 hours                     vancomycin 1.75 g in 5 % dextrose 500 mL IVPB (mg) 1,750 mg New Bag 07/23/19 2114     1,750 mg New Bag  1338     1,750 mg New Bag  0429    vancomycin 1.5 g in 5 % dextrose 250 mL IVPB (mg) 1,500 mg  New Bag 07/22/19 2050     1,500 mg New Bag  1158     1,500 mg New Bag  0404     1,500 mg New Bag 07/21/19 1917     1,500 mg New Bag  1105                      Drug levels (last 3 results):  Recent Labs   Lab Result Units 07/21/19  0342  07/22/19  2021 07/23/19  1231 07/23/19  2104   Vancomycin, Random ug/mL 10.5  --   --   --   --    Vancomycin-Trough ug/mL  --    < > 13.7 17.5 21.9    < > = values in this interval not displayed.       Microbiologic Results:  Microbiology Results (last 7 days)       Procedure Component Value Units Date/Time    Culture, Anaerobe [[id number]] Collected:  07/17/19 1044    Order Status:  Completed Specimen:  Bone from Vertebra Updated:  07/22/19 1118     Anaerobic Culture No anaerobes isolated    Blood culture [[id number]] Collected:  07/15/19 1619    Order Status:  Completed Specimen:  Blood from Peripheral, Upper Arm, Left Updated:  07/21/19 0612     Blood Culture, Routine No growth after 5 days.    Blood culture [[id number]] Collected:  07/15/19 1600    Order Status:  Completed Specimen:  Blood from Peripheral, Upper Arm, Left Updated:  07/21/19 0612     Blood Culture, Routine No growth after 5 days.    Aerobic culture [[id number]]  (Abnormal)  (Susceptibility) Collected:  07/17/19 1044    Order Status:  Completed Specimen:  Bone from Vertebra Updated:  07/20/19 1312     Aerobic Bacterial Culture METHICILLIN RESISTANT STAPHYLOCOCCUS AUREUS  Many      AFB Culture & Smear [[id number]] Collected:  07/17/19 1044    Order Status:  Completed Specimen:  Bone from Vertebra Updated:  07/19/19 0927     AFB Culture & Smear Culture in progress     AFB CULTURE STAIN No acid fast bacilli seen.    Gram stain [[id number]] Collected:  07/17/19 1044    Order Status:  Completed Specimen:  Bone from Vertebra Updated:  07/17/19 2222     Gram Stain Result Rare WBC's      No organisms seen    Fungus culture [[id number]] Collected:  07/17/19 1044    Order Status:  Sent Specimen:  Bone from Vertebra Updated:   07/17/19 2015    Gram stain [[id number]]     Order Status:  Canceled Specimen:  Tissue     Culture, Anaerobic [[id number]]     Order Status:  Canceled Specimen:  Tissue     AFB Culture & Smear [[id number]]     Order Status:  Canceled Specimen:  Tissue     Fungus culture [[id number]]     Order Status:  Canceled Specimen:  Tissue     Tissue culture [[id number]]     Order Status:  Canceled Specimen:  Tissue

## 2019-07-24 NOTE — PLAN OF CARE
Problem: Adult Inpatient Plan of Care  Goal: Plan of Care Review  Outcome: Ongoing (interventions implemented as appropriate)  Fall precautions implemented. Pt remained free from falls/injuries. Mom remained at bedside.  VSS. IV Abx infusing per orders. Pain adequately controlled. Contact precautions maintained.   C collar remained on at all times. Ambulated room multiple times throughout the night  Safety precautions implemented. Chart reviewed. Will continue to monitor.

## 2019-07-24 NOTE — PLAN OF CARE
Spoke with Mindy RASMUSSEN, Discharge Planner @ [employer] ( [phone] ) Discussed expediting discharge to [facility]. She stated that she has seen the request in the system but is waiting on clinical from the [facility]. Contacted Shae at [facility], discussed referral, she is re-faxing clinicals. Per Mindy at [facility], plan for discharge tomorrow.       07/24/19 1457   Post-Acute Status   Post-Acute Authorization Placement   Post-Acute Placement Status Pending Payor Review

## 2019-07-24 NOTE — PLAN OF CARE
Problem: Adult Inpatient Plan of Care  Goal: Plan of Care Review  Outcome: Ongoing (interventions implemented as appropriate)  Fall precautions maintained. Pt free from falls/injuries.  Patient complains of pain. Pain controlled with prn meds.  Antibiotics given as prescribed.  Ambulates and repositions independently.   Plan of care and medications discussed with patient.  Patient verbalized understanding.  Bed locked and low, call bell within reach.  Chart check done. Will continue to monitor.

## 2019-07-25 ENCOUNTER — TELEPHONE (OUTPATIENT)
Dept: HEPATOLOGY | Facility: HOSPITAL | Age: 29
End: 2019-07-25

## 2019-07-25 VITALS
BODY MASS INDEX: 22.09 KG/M2 | HEART RATE: 114 BPM | OXYGEN SATURATION: 97 % | SYSTOLIC BLOOD PRESSURE: 142 MMHG | WEIGHT: 145.75 LBS | RESPIRATION RATE: 18 BRPM | DIASTOLIC BLOOD PRESSURE: 97 MMHG | TEMPERATURE: 98 F | HEIGHT: 68 IN

## 2019-07-25 PROBLEM — E87.6 HYPOKALEMIA: Status: RESOLVED | Noted: 2019-07-15 | Resolved: 2019-07-25

## 2019-07-25 PROBLEM — A41.9 SEPSIS: Status: RESOLVED | Noted: 2019-07-15 | Resolved: 2019-07-25

## 2019-07-25 PROBLEM — R03.0 ELEVATED BP WITHOUT DIAGNOSIS OF HYPERTENSION: Status: RESOLVED | Noted: 2019-07-15 | Resolved: 2019-07-25

## 2019-07-25 PROBLEM — G06.1 ABSCESS IN EPIDURAL SPACE OF CERVICAL SPINE: Status: RESOLVED | Noted: 2019-07-16 | Resolved: 2019-07-25

## 2019-07-25 PROBLEM — M86.8X8: Status: RESOLVED | Noted: 2019-07-15 | Resolved: 2019-07-25

## 2019-07-25 PROBLEM — G95.20 CERVICAL SPINAL CORD COMPRESSION: Chronic | Status: RESOLVED | Noted: 2019-07-15 | Resolved: 2019-07-25

## 2019-07-25 LAB
ANION GAP SERPL CALC-SCNC: 11 MMOL/L (ref 8–16)
BASOPHILS # BLD AUTO: 0.01 K/UL (ref 0–0.2)
BASOPHILS NFR BLD: 0.1 % (ref 0–1.9)
BUN SERPL-MCNC: 12 MG/DL (ref 6–20)
CALCIUM SERPL-MCNC: 8.8 MG/DL (ref 8.7–10.5)
CHLORIDE SERPL-SCNC: 101 MMOL/L (ref 95–110)
CO2 SERPL-SCNC: 27 MMOL/L (ref 23–29)
CREAT SERPL-MCNC: 0.7 MG/DL (ref 0.5–1.4)
DIFFERENTIAL METHOD: ABNORMAL
EOSINOPHIL # BLD AUTO: 0.3 K/UL (ref 0–0.5)
EOSINOPHIL NFR BLD: 1.9 % (ref 0–8)
ERYTHROCYTE [DISTWIDTH] IN BLOOD BY AUTOMATED COUNT: 13.5 % (ref 11.5–14.5)
EST. GFR  (AFRICAN AMERICAN): >60 ML/MIN/1.73 M^2
EST. GFR  (NON AFRICAN AMERICAN): >60 ML/MIN/1.73 M^2
GLUCOSE SERPL-MCNC: 88 MG/DL (ref 70–110)
HCT VFR BLD AUTO: 40.9 % (ref 40–54)
HGB BLD-MCNC: 13.8 G/DL (ref 14–18)
LYMPHOCYTES # BLD AUTO: 3.8 K/UL (ref 1–4.8)
LYMPHOCYTES NFR BLD: 23.5 % (ref 18–48)
MCH RBC QN AUTO: 28.7 PG (ref 27–31)
MCHC RBC AUTO-ENTMCNC: 33.7 G/DL (ref 32–36)
MCV RBC AUTO: 85 FL (ref 82–98)
MONOCYTES # BLD AUTO: 1.7 K/UL (ref 0.3–1)
MONOCYTES NFR BLD: 10.5 % (ref 4–15)
NEUTROPHILS # BLD AUTO: 10.2 K/UL (ref 1.8–7.7)
NEUTROPHILS NFR BLD: 66.9 % (ref 38–73)
PLATELET # BLD AUTO: 351 K/UL (ref 150–350)
PMV BLD AUTO: 8.9 FL (ref 9.2–12.9)
POTASSIUM SERPL-SCNC: 3.8 MMOL/L (ref 3.5–5.1)
RBC # BLD AUTO: 4.81 M/UL (ref 4.6–6.2)
SODIUM SERPL-SCNC: 139 MMOL/L (ref 136–145)
VANCOMYCIN TROUGH SERPL-MCNC: 20.3 UG/ML (ref 10–22)
VANCOMYCIN TROUGH SERPL-MCNC: 21.5 UG/ML (ref 10–22)
WBC # BLD AUTO: 15.99 K/UL (ref 3.9–12.7)

## 2019-07-25 PROCEDURE — 25000003 PHARM REV CODE 250: Performed by: INTERNAL MEDICINE

## 2019-07-25 PROCEDURE — 85025 COMPLETE CBC W/AUTO DIFF WBC: CPT

## 2019-07-25 PROCEDURE — 80202 ASSAY OF VANCOMYCIN: CPT

## 2019-07-25 PROCEDURE — 63600175 PHARM REV CODE 636 W HCPCS: Performed by: INTERNAL MEDICINE

## 2019-07-25 PROCEDURE — 80202 ASSAY OF VANCOMYCIN: CPT | Mod: 91

## 2019-07-25 PROCEDURE — 80048 BASIC METABOLIC PNL TOTAL CA: CPT

## 2019-07-25 PROCEDURE — 25000003 PHARM REV CODE 250: Performed by: PHYSICIAN ASSISTANT

## 2019-07-25 PROCEDURE — 36415 COLL VENOUS BLD VENIPUNCTURE: CPT

## 2019-07-25 PROCEDURE — 97116 GAIT TRAINING THERAPY: CPT

## 2019-07-25 PROCEDURE — 97530 THERAPEUTIC ACTIVITIES: CPT

## 2019-07-25 RX ORDER — RIFAMPIN 300 MG/1
600 CAPSULE ORAL DAILY
Qty: 60 CAPSULE | Refills: 1
Start: 2019-07-26 | End: 2019-09-03

## 2019-07-25 RX ORDER — VANCOMYCIN HCL IN 5 % DEXTROSE 1.5G/250ML
1500 PLASTIC BAG, INJECTION (ML) INTRAVENOUS
Status: DISCONTINUED | OUTPATIENT
Start: 2019-07-25 | End: 2019-07-25 | Stop reason: HOSPADM

## 2019-07-25 RX ORDER — OXYCODONE AND ACETAMINOPHEN 10; 325 MG/1; MG/1
1 TABLET ORAL EVERY 6 HOURS PRN
Qty: 15 TABLET | Refills: 0 | Status: SHIPPED | OUTPATIENT
Start: 2019-07-25 | End: 2019-09-25 | Stop reason: SDUPTHER

## 2019-07-25 RX ORDER — VANCOMYCIN HCL IN 5 % DEXTROSE 1.5G/250ML
1500 PLASTIC BAG, INJECTION (ML) INTRAVENOUS EVERY 8 HOURS
Qty: 22500 ML
Start: 2019-07-25 | End: 2019-09-03

## 2019-07-25 RX ORDER — IBUPROFEN 200 MG
1 TABLET ORAL DAILY
Refills: 0 | COMMUNITY
Start: 2019-07-26 | End: 2019-08-12

## 2019-07-25 RX ADMIN — FAMOTIDINE 20 MG: 20 TABLET ORAL at 09:07

## 2019-07-25 RX ADMIN — RIFAMPIN 600 MG: 300 CAPSULE ORAL at 09:07

## 2019-07-25 RX ADMIN — OXYCODONE HYDROCHLORIDE 15 MG: 5 TABLET ORAL at 01:07

## 2019-07-25 RX ADMIN — OXYCODONE HYDROCHLORIDE 15 MG: 5 TABLET ORAL at 07:07

## 2019-07-25 RX ADMIN — DOCUSATE SODIUM 100 MG: 100 CAPSULE, LIQUID FILLED ORAL at 09:07

## 2019-07-25 RX ADMIN — THERA TABS 1 TABLET: TAB at 09:07

## 2019-07-25 RX ADMIN — OXYCODONE HYDROCHLORIDE 15 MG: 5 TABLET ORAL at 11:07

## 2019-07-25 RX ADMIN — VANCOMYCIN HYDROCHLORIDE 1750 MG: 750 INJECTION, POWDER, LYOPHILIZED, FOR SOLUTION INTRAVENOUS at 04:07

## 2019-07-25 NOTE — PROGRESS NOTES
Pharmacokinetic Assessment Follow Up: IV Vancomycin    Vancomycin serum concentration assessment(s):    The trough level was drawn incorrectly (approximately 2 hours early) and cannot be used to guide therapy at this time. The measurement is above the desired definitive target range of 15 to 20 mcg/mL. The trough level was 21.5 mcg/mL at 0153 (almost 2 hours early) on a q 8 hour dosing regimen.     Vancomycin Regimen Plan:    Change regimen to Vancomycin 1750 mg IV every 12 hours to start at 0400 this morning, approximately 2 hrs following the trough reading. The next serum trough concentration is scheduled for 1530 prior to the next dose on 7/25/19. After reviewing the dosing history for this patient it appears that 1,750 mg every 12 hours may keep the patient therapeutic at 15-20 mcg/mL.     Pharmacy will continue to follow and monitor vancomycin.    Please contact pharmacy at extension [phone] for questions regarding this assessment.    Thank you for the consult,   Murray Ferrari     Patient brief summary:  Alex Soares is a 28 y.o. male initiated on antimicrobial therapy with IV Vancomycin for treatment of suspected bone/joint    The patient received a loading dose, followed by the current treatment regimen: vancomycin 1500 mg IV every 8 hours    Drug Allergies:   Review of patient's allergies indicates:   Allergen Reactions    Ceclor [cefaclor] Hives    Cefixime Hives    Cefzil [cefprozil] Hives       Actual Body Weight:   66.1 kg    Renal Function:   Estimated Creatinine Clearance: 146.9 mL/min (based on SCr of 0.7 mg/dL).,     Dialysis Method (if applicable):  N/A     CBC (last 72 hours):  Recent Labs   Lab Result Units 07/23/19  0549   WBC K/uL 21.87*   Hemoglobin g/dL 12.6*   Hematocrit % 38.2*   Platelets K/uL 393*   Gran% % 86.9*   Lymph% % 8.5*   Mono% % 5.9   Eosinophil% % 0.0   Basophil% % 0.0   Differential Method  Automated       Metabolic Panel (last 72 hours):  Recent Labs   Lab Result Units  07/23/19  0549   Sodium mmol/L 137   Potassium mmol/L 4.0   Chloride mmol/L 103   CO2 mmol/L 25   Glucose mg/dL 126*   BUN, Bld mg/dL 17   Creatinine mg/dL 0.7       Vancomycin Administrations:  vancomycin given in the last 96 hours                     vancomycin 1.5 g in 5 % dextrose 250 mL IVPB (mg) 1,500 mg New Bag 07/24/19 1930    vancomycin 1.75 g in 5 % dextrose 500 mL IVPB (mg) 1,750 mg New Bag 07/24/19 0947    vancomycin 1.75 g in 5 % dextrose 500 mL IVPB (mg) 1,750 mg New Bag 07/23/19 2114     1,750 mg New Bag  1338     1,750 mg New Bag  0429                      Drug levels (last 3 results):  Recent Labs   Lab Result Units 07/23/19  1231 07/23/19  2104 07/24/19  0519 07/25/19  0153   Vancomycin, Random ug/mL  --   --  12.7  --    Vancomycin-Trough ug/mL 17.5 21.9  --  21.5       Microbiologic Results:  Microbiology Results (last 7 days)       Procedure Component Value Units Date/Time    Fungus culture [[id number]] Collected:  07/17/19 1044    Order Status:  Completed Specimen:  Bone from Vertebra Updated:  07/24/19 1044     Fungus (Mycology) Culture Culture in progress    Culture, Anaerobe [[id number]] Collected:  07/17/19 1044    Order Status:  Completed Specimen:  Bone from Vertebra Updated:  07/22/19 1118     Anaerobic Culture No anaerobes isolated    Blood culture [[id number]] Collected:  07/15/19 1619    Order Status:  Completed Specimen:  Blood from Peripheral, Upper Arm, Left Updated:  07/21/19 0612     Blood Culture, Routine No growth after 5 days.    Blood culture [[id number]] Collected:  07/15/19 1600    Order Status:  Completed Specimen:  Blood from Peripheral, Upper Arm, Left Updated:  07/21/19 0612     Blood Culture, Routine No growth after 5 days.    Aerobic culture [[id number]]  (Abnormal)  (Susceptibility) Collected:  07/17/19 1044    Order Status:  Completed Specimen:  Bone from Vertebra Updated:  07/20/19 1312     Aerobic Bacterial Culture METHICILLIN RESISTANT STAPHYLOCOCCUS AUREUS  Many       AFB Culture & Smear [[id number]] Collected:  07/17/19 1044    Order Status:  Completed Specimen:  Bone from Vertebra Updated:  07/19/19 0919     AFB Culture & Smear Culture in progress     AFB CULTURE STAIN No acid fast bacilli seen.

## 2019-07-25 NOTE — NURSING
Patient received written and verbal discharge instructions. Patient verbalized understanding. Discharging to [facility] in [place] awaiting arrival of [facility].

## 2019-07-25 NOTE — PT/OT/SLP PROGRESS
Physical Therapy  Treatment    Alex Soares   MRN: [id number]   Admitting Diagnosis: Cervical spinal cord compression    PT Received On: 07/25/19  PT Start Time: 0836     PT Stop Time: 0900    PT Total Time (min): 24 min       Billable Minutes:  Gait Training 14 and Therapeutic Activity 10    Treatment Type: Treatment  PT/PTA: PT     PTA Visit Number: 1       General Precautions: Standard, fall, contact  Orthopedic Precautions: (NO LIFTING >5#)   Braces: Cervical collar         Subjective:  Communicated with NURSE AND Epic CHART REVIEW prior to session.  PT AGREED TO TX     Pain/Comfort  Pain Rating 1: 2/10  Location 1: neck  Pain Addressed 1: Pre-medicate for activity  Pain Rating Post-Intervention 1: 2/10    Objective:   Patient found with: peripheral IV, cervical collar    Functional Mobility:  PT MET IN  STOOD WITH NO AD AND GT TRAINED X  COMMUNITY DISTANCES. PT RETURNED TO  FOR FALL RECOVERY PRACTICE WITH 1/2 KNEEL B LE X 2 TRIALS FROM STANDING TO SITTING ON FLOOR AND BACK TO STAND WITH CGA. PT WITH INC FATIGUE NOTED. PT TO STAIRS FOR ASCEND AND DESCEND 3X10 STEPS WITH 1 RAILING AND CGA. PT RETURNED TO  T/F TO EOB AND LEFT WITH ALL NEEDS MET     AM-PAC 6 CLICK MOBILITY  How much help from another person does this patient currently need?   1 = Unable, Total/Dependent Assistance  2 = A lot, Maximum/Moderate Assistance  3 = A little, Minimum/Contact Guard/Supervision  4 = None, Modified [place]/Independent    Turning over in bed (including adjusting bedclothes, sheets and blankets)?: 4  Sitting down on and standing up from a chair with arms (e.g., wheelchair, bedside commode, etc.): 4  Moving from lying on back to sitting on the side of the bed?: 4  Moving to and from a bed to a chair (including a wheelchair)?: 4  Need to walk in hospital room?: 4  Climbing 3-5 steps with a railing?: 3  Basic Mobility Total Score: 23    AM-PAC Raw Score CMS G-Code Modifier Level of Impairment Assistance   6 %  Total / Unable   7 - 9 CM 80 - 100% Maximal Assist   10 - 14 CL 60 - 80% Moderate Assist   15 - 19 CK 40 - 60% Moderate Assist   20 - 22 CJ 20 - 40% Minimal Assist   23 CI 1-20% SBA / CGA   24 CH 0% Independent/ Mod I     Patient left supine with call button in reach.    Assessment:  PT PROGRESSING WITH MOBILITY .     Rehab identified problem list/impairments: Rehab identified problem list/impairments: weakness, impaired endurance, impaired functional mobilty, gait instability, decreased lower extremity function, impaired balance, pain, decreased safety awareness    Rehab potential is excellent.    Activity tolerance: Good    Discharge recommendations: Discharge Facility/Level of Care Needs: outpatient PT     Barriers to discharge:      Equipment recommendations: Equipment Needed After Discharge: none     GOALS:   Multidisciplinary Problems     Physical Therapy Goals        Problem: Physical Therapy Goal    Goal Priority Disciplines Outcome Goal Variances Interventions   Physical Therapy Goal     PT, PT/OT Ongoing (interventions implemented as appropriate)     Description:  PT WILL BE SEEN FOR P.T. FOR A MIN OF 5 OUT OF 7 DAYS A WEEK  LT19  1. PT WILL COMPLETE BED MOBILITY IND  2. PT WILL T/F TO CHAIR IND  3. PT WILL GT TRAIN X 700' IND   4. PT WILL COMPLETE B LE TE X 20 REPS  5. PT WILL ASCEND AND DESCEND 1 FLIGHT OF STEPS MOD I                       PLAN:    Patient to be seen    to address the above listed problems via gait training, therapeutic activities, therapeutic exercises  Plan of Care expires: 19  Plan of Care reviewed with: patient         Abby Reich, PT  2019

## 2019-07-25 NOTE — PROGRESS NOTES
[facility] -   Neurosurgery  Progress Note    Subjective:   History of Present Illness:   No notes on file.     POD# 8  Patient doing well this morning  Afebrile overnight  No new complaints  Tolerating diet  Numbness, tingling, weakness and pain much improved  WBC trending down 21.87 to 15.99 today  Hgt/Hct improving  On vanco/rifampin           Post-Op Info:  Procedure(s) (LRB):  DECOMPRESSION AND FUSION, SPINE, CERVICAL, ANTERIOR APPROACH (N/A)  CORPECTOMY, SPINE, CERVICAL (N/A)  INCISION AND DRAINAGE, ABSCESS (N/A)  APPLICATION, ACELLULAR HUMAN DERMAL ALLOGRAFT (N/A)   8 Days Post-Op      Medications:  Continuous Infusions:  Scheduled Meds:   docusate sodium  100 mg Oral Daily    famotidine  20 mg Oral BID    multivitamin  1 tablet Oral Daily    nicotine  1 patch Transdermal Daily    nozaseptin   Each Nare BID    rifAMpin  600 mg Oral Daily    vancomycin (VANCOCIN) IVPB  1,500 mg Intravenous Q8H     PRN Meds:acetaminophen, aluminum-magnesium hydroxide-simethicone, diazePAM, diphenhydrAMINE, HYDROmorphone, HYDROmorphone, ibuprofen, ondansetron, oxyCODONE, oxyCODONE-acetaminophen, oxyCODONE-acetaminophen, promethazine, ramelteon, senna-docusate 8.6-50 mg       Objective:     Weight: 66.1 kg (145 lb 11.6 oz)  Body mass index is 22.09 kg/m².  Vital Signs (Most Recent):  Temp: 98 °F (36.7 °C) (07/25/19 0748)  Pulse: 80 (07/25/19 0748)  Resp: 17 (07/25/19 0748)  BP: 130/71 (07/25/19 0748)  SpO2: 97 % (07/25/19 0748) Vital Signs (24h Range):  Temp:  [97.9 °F (36.6 °C)-98.5 °F (36.9 °C)] 98 °F (36.7 °C)  Pulse:  [] 80  Resp:  [17-20] 17  SpO2:  [97 %-99 %] 97 %  BP: (120-141)/(71-93) 130/71                          Neurosurgery Physical Exam     MAEW  Incision CDI  Trach midline  Tolerating diet well  Aspen collar in place      Significant Labs:  Recent Labs   Lab 07/25/19  0435   GLU 88      K 3.8      CO2 27   BUN 12   CREATININE 0.7   CALCIUM 8.8     Recent Labs   Lab  07/25/19  0435   WBC 15.99*   HGB 13.8*   HCT 40.9   *     No results for input(s): LABPT, INR, APTT in the last 48 hours.  Microbiology Results (last 7 days)     Procedure Component Value Units Date/Time    Fungus culture [[id number]] Collected:  07/17/19 1044    Order Status:  Completed Specimen:  Bone from Vertebra Updated:  07/24/19 1044     Fungus (Mycology) Culture Culture in progress    Culture, Anaerobe [[id number]] Collected:  07/17/19 1044    Order Status:  Completed Specimen:  Bone from Vertebra Updated:  07/22/19 1118     Anaerobic Culture No anaerobes isolated    Blood culture [[id number]] Collected:  07/15/19 1619    Order Status:  Completed Specimen:  Blood from Peripheral, Upper Arm, Left Updated:  07/21/19 0612     Blood Culture, Routine No growth after 5 days.    Blood culture [[id number]] Collected:  07/15/19 1600    Order Status:  Completed Specimen:  Blood from Peripheral, Upper Arm, Left Updated:  07/21/19 0612     Blood Culture, Routine No growth after 5 days.    Aerobic culture [[id number]]  (Abnormal)  (Susceptibility) Collected:  07/17/19 1044    Order Status:  Completed Specimen:  Bone from Vertebra Updated:  07/20/19 1312     Aerobic Bacterial Culture METHICILLIN RESISTANT STAPHYLOCOCCUS AUREUS  Many      AFB Culture & Smear [[id number]] Collected:  07/17/19 1044    Order Status:  Completed Specimen:  Bone from Vertebra Updated:  07/19/19 0927     AFB Culture & Smear Culture in progress     AFB CULTURE STAIN No acid fast bacilli seen.        ABGs: No results for input(s): PH, PCO2, PO2, HCO3, POCSATURATED, BE in the last 48 hours.  Cardiac markers: No results for input(s): CKMB, CPKMB, TROPONINT, TROPONINI, MYOGLOBIN in the last 48 hours.  CMP:   Recent Labs   Lab 07/25/19 0435   GLU 88   CALCIUM 8.8      K 3.8   CO2 27      BUN 12   CREATININE 0.7     CRP:   Recent Labs   Lab 07/23/19  0950   CRP 1.2     Recent Lab Results       07/25/19  0435   07/25/19  0153        Anion  Gap 11       BUN, Bld 12       Calcium 8.8       Chloride 101       CO2 27       Creatinine 0.7       eGFR if  >60       eGFR if non  >60  Comment:  Calculation used to obtain the estimated glomerular filtration  rate (eGFR) is the CKD-EPI equation.          Glucose 88       Hematocrit 40.9       Hemoglobin 13.8       MCH 28.7       MCHC 33.7       MCV 85       MPV 8.9       Platelets 351       Potassium 3.8       RBC 4.81       RDW 13.5       Sodium 139       Vancomycin-Trough   21.5     WBC 15.99           All pertinent labs from the last 24 hours have been reviewed.  Significant Diagnostics:  I have reviewed all pertinent imaging results/findings within the past 24 hours.    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 Garcia  Plan for Neurosurgery follow up 2 weeks following surgery date  Awaiting placement at [facility] some time today    Neelam Bhat PA-C  Neurosurgery  [facility]

## 2019-07-25 NOTE — PLAN OF CARE
Problem: Adult Inpatient Plan of Care  Goal: Plan of Care Review  Outcome: Ongoing (interventions implemented as appropriate)  Fall prevention precautions maintained, pt remained free of falls throughout shift, call bell and personal items within reach, pt's pain adequately controlled by prn pain medication, IV antibiotics continued as ordered, on contact precautions for MRSA. 24 hour chart check completed. Will continue to monitor

## 2019-07-25 NOTE — PLAN OF CARE
Discharge orders noted. Faxed dc orders, avs, labs and progress notes to [facility].   No discharge packet needed. Vancomycin orders clarified with provider.  Update to patient and his mother. Patient's mother is contacting family for a ride to the facility.Update to armando Cowan nurse.        Disposition: [facility]    # for report: [phone]    Transportation:  [phone]

## 2019-07-25 NOTE — PLAN OF CARE
Call received from Beth , [employer] Discharge Planner. Patient has auth to go to [facility]. Secure message sent to provider requesting discharge orders and picc line order if appropriate. Update to patient and Camryn roberts nurse.       07/25/19 09   Post-Acute Status   Post-Acute Authorization Placement   Post-Acute Placement Status Awaiting Orders for LTAC

## 2019-07-25 NOTE — ASSESSMENT & PLAN NOTE
Vanc/meropenum  Neurosurgery managing.   Cervical collar inplace  Further evaluation/diagnostics/interventions/consults pending course       07/22- will plan to complete 6 weeks of IV Vancomycin, needs close follow up      07/23- will add rifampin , continue IV Vancomycin and will plan to complete 6 weeks of IV therapy .    07/24- will continue rifampin/vancomycin, closely monitor wbc and vanco trough

## 2019-07-25 NOTE — PROGRESS NOTES
[facility] Hospital Medicine  Progress Note    Patient Name: Alex Soares  MRN: [id number]  Patient Class: IP- Inpatient   Admission Date: 7/15/2019  Length of Stay: 9 days  Attending Physician: Rony Garcia MD  Primary Care Provider: Simi Macdonald MD        Subjective:     Principal Problem:Cervical spinal cord compression      HPI:  28-year-old male with history of IV drug use, last use of heroin about 2 weeks ago, and tattoos, last recent tattoo 4 weeks ago; presents today as a transfer from outside freestanding ER for neck pain. Patient reports he has been having neck pain over the past month and has been seen outpatient but has not improved.  Patient reports playing with the dog earlier which exacerbated his pain and why he presented to the emergency room.  Patient was evaluated in the ER MRI performed and shows a cervical C6-7 with surrounding vertebral body osteomyelitis. Neurosurgery was consulted by ER.  Vancomycin was started.  Hospital Medicine consulted patient admitted.  Patient seen and examined on arrival.  Cervical collar in place on arrival.  Patient with allergies to cephalosporin reports hives in past.  Patient with increased risk of MRSA/MSSA will cover with both vancomycin and meropenem (given patient allergy).    Overview/Hospital Course:  28-year-old male with history of IV drug use, last use of heroin - a day prior to presentation .  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  No definite cord edema at this time.  Postcontrast imaging may be obtained as needed.  2. Vertebral body osteomyelitis of C5, C6, C7, and T1.  3. Anterior paraspinal phlegmon/abscess extending from mid C2 to mid T1  07/17 -s/p Cervical spine decompression.   As of 7/18/2019, patient seen and examined, s/p anterior cervical decompression and fusion, POD # 1 by Dr Jeong.  Patient  reports feeling well, moving all extremities, cervical collar intact. Vitals signs and labs stable. ID following, awaiting culture results from surgery.   As of 7/19, patient seen and examined, s/p anterior cervical decompression and fusion, POD # 2. Patient feeling well, ambulating in clements without difficult. ID following, continue Vancomycin and Meronem. Awaiting cultures to guide therapy.    As of 7/20, s/p s/p anterior cervical decompression and fusion, POD # 3. ID following, plan on 6-8 weeks of antibiotics preferably IV therapy. Awaiting further recommendations.   As of 7/21, POD # 4  s/p anterior cervical decompression and fusion, will need 6-8 weeks of IV antibiotics, patient has history of IV drug abuse. Will need LTAC placement.      07/22- awaiting LTAC placement , still on IV Vancomycin .  07/23- 28 year old man s/p . Cervical corpectomy C6   2. Cervical corpectomy C7   3. Placement of anterior plate with screws C5-T1   4. Cervical arthrodesis C5-T1  5. Placement of PEEK interbody cage    On 07/17.  Wound cultures - MRSA  He wants oral medications and not IV medications     07/24- he is awaiting LTAC placement -on vanco/rifampin.  He is afebrile.  WBC remains high at 21    Interval History:   07/24- he is awaiting LTAC placement -on vanco/rifampin.  He is afebrile.  WBC remains high at 21      Review of Systems   Constitutional: Negative for chills, diaphoresis, fatigue and fever.   HENT: Negative for congestion, ear discharge, rhinorrhea, sinus pressure, sore throat and trouble swallowing.    Eyes: Negative for discharge and visual disturbance.   Respiratory: Negative for apnea, cough, choking, chest tightness, shortness of breath, wheezing and stridor.    Cardiovascular: Negative for chest pain, palpitations and leg swelling.   Gastrointestinal: Negative for abdominal distention, abdominal pain, diarrhea, nausea and vomiting.   Endocrine: Negative for cold intolerance and heat intolerance.    Genitourinary: Negative for dysuria, frequency and hematuria.   Musculoskeletal: Negative for arthralgias, back pain, myalgias and neck pain.   Skin: Negative for pallor and rash.   Neurological: Negative for dizziness, seizures, syncope, weakness and headaches.   Psychiatric/Behavioral: Negative for agitation, confusion and sleep disturbance.     Objective:     Vital Signs (Most Recent):  Temp: 98.5 °F (36.9 °C) (07/24/19 1933)  Pulse: (!) 116 (07/24/19 1933)  Resp: 17 (07/24/19 1933)  BP: (!) 139/90 (07/24/19 1933)  SpO2: 98 % (07/24/19 1933) Vital Signs (24h Range):  Temp:  [98 °F (36.7 °C)-98.7 °F (37.1 °C)] 98.5 °F (36.9 °C)  Pulse:  [] 116  Resp:  [17-20] 17  SpO2:  [97 %-99 %] 98 %  BP: (131-141)/(72-93) 139/90     Weight: 66.1 kg (145 lb 11.6 oz)  Body mass index is 22.09 kg/m².    Intake/Output Summary (Last 24 hours) at 7/24/2019 2151  Last data filed at 7/24/2019 1400  Gross per 24 hour   Intake 1340 ml   Output --   Net 1340 ml      Physical Exam   Constitutional: He is oriented to person, place, and time. No distress. Cervical collar in place.   HENT:   Mouth/Throat: No oropharyngeal exudate.   Eyes: Right eye exhibits no discharge. Left eye exhibits no discharge.   Neck: No JVD present.   Cardiovascular: Exam reveals no gallop and no friction rub.   No murmur heard.  Pulmonary/Chest: No stridor. No respiratory distress. He has no wheezes. He has no rales. He exhibits no tenderness.   Abdominal: He exhibits no distension and no mass. There is no tenderness. There is no rebound and no guarding. No hernia.   Musculoskeletal: He exhibits no edema or deformity.   Neurological: He is alert and oriented to person, place, and time.   Skin: Skin is warm and dry. Capillary refill takes less than 2 seconds. He is not diaphoretic.   Nursing note and vitals reviewed.      Significant Labs:   CBC:   Recent Labs   Lab 07/23/19  0549   WBC 21.87*   HGB 12.6*   HCT 38.2*   *     All pertinent labs within  the past 24 hours have been reviewed.    Significant Imaging: I have reviewed and interpreted all pertinent imaging results/findings within the past 24 hours.      Assessment/Plan:      * Cervical spinal cord compression  S/P cervical spine decompression       Abscess in epidural space of cervical spine      07/17- Incision and drainage     ID following, plan on 6-8 weeks of antibiotic therapy. Continue Vancomycin IV for now.  Pt has history IV drug abuse, will need LTAC placement     Sepsis  Resolved         Elevated BP without diagnosis of hypertension  Monitor consider daily therapy pending course  Further evaluation/diagnostics/interventions/consults pending course         Hypokalemia  Resolved       IV drug user  Vancomycin.   ID following   Echo complete   The patient was counseled on the dangers of use and encouraged to quit.  Further evaluation/diagnostics/interventions/consults pending course     Needs out patient drug cessation counseling.    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  Patient does have some mild Neuro impairment with unsteady gait and generalized weakness all extremities on my exam and I have notified Neuro surgery at this time.  Awaiting their response/recommendations.  Patient was started on vancomycin in ED will increase coverage with meropenem.  Given his allergy and findings of osteo on MRI.  Consider addition of nafcillin to regimen pending course.    Will consult ID  Neuro checks  Further evaluation/diagnostics/interventions/consults pending course     07/16- will continue vancomycin / and use Aztreonam -will use cultures to guide therapy     7/18 - ID following,  s/p anterior cervical decompression and fusion, POD # 1, continue Vanco and Aztreonam, will use cultures to guide therapy.     7/19 - ID following,   s/p anterior cervical decompression and fusion, POD # 2, continue Vanco and Meropenem, use cultures to guide therapy.     ID following, continue Vancomycin IV, awaiting LTAC placement    07/22-will continue IV Vancomycin , will discuss with pharmacy , needs higher vanco trough   07/23- will continue Vancomycin and rifampin. He needs close follow up .  ESR and CRP are now normal but with MRSA noted in the wound culture and screws present , will prefer to do prolonged therapy        Spondylodiscitis  Vanc/meropenum  Neurosurgery managing.   Cervical collar inplace  Further evaluation/diagnostics/interventions/consults pending course       07/22- will plan to complete 6 weeks of IV Vancomycin, needs close follow up      07/23- will add rifampin , continue IV Vancomycin and will plan to complete 6 weeks of IV therapy .    07/24- will continue rifampin/vancomycin, closely monitor wbc and vanco trough            VTE Risk Mitigation (From admission, onward)        Ordered     IP VTE HIGH RISK PATIENT  Once      07/17/19 1247     Place sequential compression device  Until discontinued      07/17/19 1247                Rony Garcia MD  Department of Hospital Medicine   [facility]

## 2019-07-25 NOTE — OP NOTE
Pre Op Diagnosis: infection     Post Op Diagnosis: same     Procedure:  RUE picc line     Procedure performed by: Gracie DE LA TORRE, Douglas CASILLAS     Written Informed Consent Obtained: Yes     Specimen Removed:  no     Estimated Blood Loss:  minimal     Findings: Local anesthesia.     The patient tolerated the procedure well and there were no complications.      The RUE was sterilely prepped and draped.  Lidocaine was used as a local anesthetic.  Using u/s guidance a 5 Prydeinig 35 cm picc was placed into the brachial vein into the SVC/right atrium junction.  Placement was verified using X Ray.  PICC was secured in place with attachment device and is ready to use.  Pt tolerated proc well without immediate complications.

## 2019-07-25 NOTE — PLAN OF CARE
Problem: Adult Inpatient Plan of Care  Goal: Plan of Care Review  Outcome: Outcome(s) achieved Date Met: 07/25/19  Fall precautions maintained. Pt free from falls/injuries.  Patient complains of pain. Pain controlled with prn meds.  Antibiotics given as prescribed.  Ambulates and repositions with assistance.  Plan of care and medications discussed with patient.  Patient verbalized understanding.  Bed locked and low, call bell within reach.  Chart check done. Will continue to monitor.

## 2019-07-25 NOTE — SUBJECTIVE & OBJECTIVE
Interval History:   07/24- he is awaiting LTAC placement -on vanco/rifampin.  He is afebrile.  WBC remains high at 21      Review of Systems   Constitutional: Negative for chills, diaphoresis, fatigue and fever.   HENT: Negative for congestion, ear discharge, rhinorrhea, sinus pressure, sore throat and trouble swallowing.    Eyes: Negative for discharge and visual disturbance.   Respiratory: Negative for apnea, cough, choking, chest tightness, shortness of breath, wheezing and stridor.    Cardiovascular: Negative for chest pain, palpitations and leg swelling.   Gastrointestinal: Negative for abdominal distention, abdominal pain, diarrhea, nausea and vomiting.   Endocrine: Negative for cold intolerance and heat intolerance.   Genitourinary: Negative for dysuria, frequency and hematuria.   Musculoskeletal: Negative for arthralgias, back pain, myalgias and neck pain.   Skin: Negative for pallor and rash.   Neurological: Negative for dizziness, seizures, syncope, weakness and headaches.   Psychiatric/Behavioral: Negative for agitation, confusion and sleep disturbance.     Objective:     Vital Signs (Most Recent):  Temp: 98.5 °F (36.9 °C) (07/24/19 1933)  Pulse: (!) 116 (07/24/19 1933)  Resp: 17 (07/24/19 1933)  BP: (!) 139/90 (07/24/19 1933)  SpO2: 98 % (07/24/19 1933) Vital Signs (24h Range):  Temp:  [98 °F (36.7 °C)-98.7 °F (37.1 °C)] 98.5 °F (36.9 °C)  Pulse:  [] 116  Resp:  [17-20] 17  SpO2:  [97 %-99 %] 98 %  BP: (131-141)/(72-93) 139/90     Weight: 66.1 kg (145 lb 11.6 oz)  Body mass index is 22.09 kg/m².    Intake/Output Summary (Last 24 hours) at 7/24/2019 2151  Last data filed at 7/24/2019 1400  Gross per 24 hour   Intake 1340 ml   Output --   Net 1340 ml      Physical Exam   Constitutional: He is oriented to person, place, and time. No distress. Cervical collar in place.   HENT:   Mouth/Throat: No oropharyngeal exudate.   Eyes: Right eye exhibits no discharge. Left eye exhibits no discharge.   Neck: No  JVD present.   Cardiovascular: Exam reveals no gallop and no friction rub.   No murmur heard.  Pulmonary/Chest: No stridor. No respiratory distress. He has no wheezes. He has no rales. He exhibits no tenderness.   Abdominal: He exhibits no distension and no mass. There is no tenderness. There is no rebound and no guarding. No hernia.   Musculoskeletal: He exhibits no edema or deformity.   Neurological: He is alert and oriented to person, place, and time.   Skin: Skin is warm and dry. Capillary refill takes less than 2 seconds. He is not diaphoretic.   Nursing note and vitals reviewed.      Significant Labs:   CBC:   Recent Labs   Lab 07/23/19  0549   WBC 21.87*   HGB 12.6*   HCT 38.2*   *     All pertinent labs within the past 24 hours have been reviewed.    Significant Imaging: I have reviewed and interpreted all pertinent imaging results/findings within the past 24 hours.

## 2019-07-25 NOTE — PLAN OF CARE
Problem: Physical Therapy Goal  Goal: Physical Therapy Goal  PT WILL BE SEEN FOR P.T. FOR A MIN OF 5 OUT OF 7 DAYS A WEEK  LT19  1. PT WILL COMPLETE BED MOBILITY IND  2. PT WILL T/F TO CHAIR IND  3. PT WILL GT TRAIN X 700' IND   4. PT WILL COMPLETE B LE TE X 20 REPS  5. PT WILL ASCEND AND DESCEND 1 FLIGHT OF STEPS MOD I      Outcome: Ongoing (interventions implemented as appropriate)  PT COMPLETED STAIR TRAINING ASCEND AND DESCEND 3X10 STEPS WITH 1 RAILING

## 2019-07-26 NOTE — PLAN OF CARE
07/26/19 1405   Final Note   Assessment Type Final Discharge Note   Anticipated Discharge Disposition LTAC   Right Care Referral Info   Post Acute Recommendation Other   Referral Type Ltac   Facility Name [facility]

## 2019-07-26 NOTE — DISCHARGE SUMMARY
[facility] Hospital Medicine  Discharge Summary      Patient Name: Alex Soares  MRN: [id number]  Admission Date: 7/15/2019  Hospital Length of Stay: 10 days  Discharge Date and Time: 7/25/2019  1:57 PM  Attending Physician: Danial Ramsey MD  Discharging Provider: Kevon Leger NP  Primary Care Provider: Simi Macdonald MD      HPI:   28-year-old male with history of IV drug use, last use of heroin about 2 weeks ago, and tattoos, last recent tattoo 4 weeks ago; presents today as a transfer from outside freestanding ER for neck pain. Patient reports he has been having neck pain over the past month and has been seen outpatient but has not improved.  Patient reports playing with the dog earlier which exacerbated his pain and why he presented to the emergency room.  Patient was evaluated in the ER MRI performed and shows a cervical C6-7 with surrounding vertebral body osteomyelitis. Neurosurgery was consulted by ER.  Vancomycin was started.  Hospital Medicine consulted patient admitted.  Patient seen and examined on arrival.  Cervical collar in place on arrival.  Patient with allergies to cephalosporin reports hives in past.  Patient with increased risk of MRSA/MSSA will cover with both vancomycin and meropenem (given patient allergy).    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  No definite cord edema at this time.  Postcontrast imaging may be obtained as needed, vertebral body osteomyelitis of C5, C6, C7, and T1 and anterior paraspinal  phlegmon/abscess extending from mid C2 to mid T1. On 07/17  Patint underwent Cervical spine decompression by Dr Jeong. As of 7/18, s/p anterior cervical decompression and fusion, POD # 1 .  Patient reported feeling well, moving all extremities, cervical collar intact. Vitals signs and labs stable. ID following, awaiting culture results from surgery.   As of 7/19, Continued Vancomycin and Meronem. Awaiting cultures to guide therapy.    As of 7/20, s/p s/p anterior cervical decompression and fusion, POD # 3. ID following, plan on 6-8 weeks of antibiotics preferably IV therapy. Awaiting further recommendations.   As of 7/21, POD # 4  s/p anterior cervical decompression and fusion, will need 6-8 weeks of IV antibiotics, patient has history of IV drug abuse. Will need LTAC placement.    07/22- awaiting LTAC placement , still on IV Vancomycin .  07/23- 28 year old man s/p . Cervical corpectomy C6   2. Cervical corpectomy C7   3. Placement of anterior plate with screws C5-T1   4. Cervical arthrodesis C5-T1  5. Placement of PEEK interbody cage    On 07/17.  Wound cultures - MRSA  He wants oral medications and not IV medications   07/24- he is awaiting LTAC placement -on vanco/rifampin.  He is afebrile.  WBC remains high at 21  As of 7/25, patient accepted to [facility]. Will need IV Vancomycin for 6-8 weeks pre ID recommendations. PICC line inserted. Keep Aspen collar in place at all times, except if taking shower. Neurosurgery follow up 2 weeks following surgery date. Patient seen, examined and [facility]      Consults:   Consults (From admission, onward)        Status Ordering Provider     Inpatient consult to Neurosurgery  Once     Provider:  Sánchez Jeong MD    Completed FLORECITA LEWIS     Inpatient consult to Social Work  Once     Provider:  (Not yet assigned)    Completed OLGA THOMAS          No new Assessment & Plan notes have been filed under this hospital service since  the last note was generated.  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 Schedule an appointment as soon as possible for a visit on 7/31/2019.    Specialty:  Neurosurgery  Why:  hospital follow up   Contact information:  [address] [facility] [address] [phone]             Rony Garcia MD. Schedule an appointment as soon as possible for a visit in 2 weeks.    Specialty:  Infectious Diseases  Why:  [facility] follow up   Contact information:  [address] [facility] [address] [phone]                 Patient Instructions:      Diet Adult Regular     Other restrictions (specify):   Scheduling Instructions: Keep Aspen collar in place at all times, except if taking shower     Notify your health care provider if you experience any of the following:  temperature >100.4     Notify your health care provider if you experience any of the following:  severe uncontrolled pain     Notify your health care provider if you experience any of the following:  persistent dizziness, light-headedness, or visual disturbances     Activity as tolerated   Scheduling Instructions: Keep  Aspen collar in place at all times, except if taking shower       Significant Diagnostic Studies: Labs:   CMP   Recent Labs   Lab 07/25/19  0435      K 3.8      CO2 27   GLU 88   BUN 12   CREATININE 0.7   CALCIUM 8.8   ANIONGAP 11   ESTGFRAFRICA >60   EGFRNONAA >60    and CBC   Recent Labs   Lab 07/25/19  0435   WBC 15.99*   HGB 13.8*   HCT 40.9   *       Pending Diagnostic Studies:     None         Medications:  Reconciled Home Medications:      Medication List      START taking these medications    nicotine 14 mg/24 hr  Commonly known as:  NICODERM CQ  Place 1 patch onto the skin once daily.     rifAMpin 300 MG capsule  Commonly known as:  RIFADIN  Take 2 capsules (600 mg total) by mouth once daily.     vancomycin in 5 % dextrose 1.5 gram/250 mL Soln  Inject 250 mLs (1,500 mg total) into the vein every 8 (eight) hours.    oxycodone-acetaminophen 10 mg tab  Commonly known as Percocet  Take 1 tab by as needed every six hour as needed for pain      STOP taking these medications    cyclobenzaprine 10 MG tablet  Commonly known as:  FLEXERIL     ibuprofen 800 MG tablet  Commonly known as:  ADVIL,MOTRIN     naproxen 375 MG tablet  Commonly known as:  NAPROSYN     tiZANidine 4 MG tablet  Commonly known as:  ZANAFLEX     traMADol 50 mg tablet  Commonly known as:  ULTRAM            Indwelling Lines/Drains at time of discharge:   Lines/Drains/Airways     Peripherally Inserted Central Catheter Line                 PICC Double Lumen 07/25/19 1039 right brachial less than 1 day                Time spent on the discharge of patient: 40 minutes  Patient was seen and examined on the date of discharge and determined to be suitable for discharge.         Kevon Leger NP  Department of Hospital Medicine  [facility]

## 2019-08-05 ENCOUNTER — TELEPHONE (OUTPATIENT)
Dept: NEUROSURGERY | Facility: CLINIC | Age: 29
End: 2019-08-05

## 2019-08-05 NOTE — TELEPHONE ENCOUNTER
Spoke with Deisy in regards to pt being scheduled for a Post Op 1    Pt was scheduled for Post Op 1 with Neelam 8/12/19 at 9am - per richard    I informed Deisy     Understanding is advised.       ----- Message from Alejandrina Odonnell sent at 8/5/2019  9:50 AM CDT -----  Contact: Deisy with [facility]  Ms Olivas states patient needs a post op aapt, please call Ms Deisy back at [phone]. Thank you

## 2019-08-12 ENCOUNTER — OFFICE VISIT (OUTPATIENT)
Dept: NEUROSURGERY | Facility: CLINIC | Age: 29
End: 2019-08-12
Payer: MEDICAID

## 2019-08-12 ENCOUNTER — HOSPITAL ENCOUNTER (OUTPATIENT)
Dept: RADIOLOGY | Facility: HOSPITAL | Age: 29
Discharge: HOME | End: 2019-08-12
Attending: PHYSICIAN ASSISTANT
Payer: MEDICAID

## 2019-08-12 VITALS
SYSTOLIC BLOOD PRESSURE: 142 MMHG | HEIGHT: 68 IN | BODY MASS INDEX: 22.73 KG/M2 | DIASTOLIC BLOOD PRESSURE: 87 MMHG | HEART RATE: 89 BPM | WEIGHT: 150 LBS | TEMPERATURE: 98 F

## 2019-08-12 DIAGNOSIS — Z98.1 STATUS POST CERVICAL SPINAL FUSION: ICD-10-CM

## 2019-08-12 DIAGNOSIS — M46.40 SPONDYLODISCITIS: ICD-10-CM

## 2019-08-12 DIAGNOSIS — M46.40 SPONDYLODISCITIS: Primary | ICD-10-CM

## 2019-08-12 PROCEDURE — 72040 X-RAY EXAM NECK SPINE 2-3 VW: CPT | Mod: 26,,, | Performed by: RADIOLOGY

## 2019-08-12 PROCEDURE — 99999 PR PBB SHADOW E&M-EST. PATIENT-LVL III: CPT | Mod: PBBFAC,,, | Performed by: PHYSICIAN ASSISTANT

## 2019-08-12 PROCEDURE — 72040 X-RAY EXAM NECK SPINE 2-3 VW: CPT | Mod: TC

## 2019-08-12 PROCEDURE — 72040 XR CERVICAL SPINE AP LATERAL: ICD-10-PCS | Mod: 26,,, | Performed by: RADIOLOGY

## 2019-08-12 PROCEDURE — 99024 PR POST-OP FOLLOW-UP VISIT: ICD-10-PCS | Mod: ,,, | Performed by: PHYSICIAN ASSISTANT

## 2019-08-12 PROCEDURE — 99213 OFFICE O/P EST LOW 20 MIN: CPT | Mod: PBBFAC,PO,25 | Performed by: PHYSICIAN ASSISTANT

## 2019-08-12 PROCEDURE — 99999 PR PBB SHADOW E&M-EST. PATIENT-LVL III: ICD-10-PCS | Mod: PBBFAC,,, | Performed by: PHYSICIAN ASSISTANT

## 2019-08-12 PROCEDURE — 99024 POSTOP FOLLOW-UP VISIT: CPT | Mod: ,,, | Performed by: PHYSICIAN ASSISTANT

## 2019-08-12 NOTE — PROGRESS NOTES
Subjective:      Patient ID: Alex Soares is a 28 y.o. male.    Chief Complaint: Post-op Evaluation; Cervical Spine Pain (C-spine); and Follow-up    HPI  The patient is seen today for postop evaluation . This is his first visit since surgery. Details of surgery are listed below. Patient is currently at [facility] until 9/4/19. He is walking daily and working with therapy. He does c/o neck pain at night. He denies weakness, numbness and tingling. Patient has been careful since surgery- no reported falls. He denies fevers and incision drainage. Patient is tolerating all food and has admitted that he has gained several pounds since being at [facility]. He is still on Abx and has completed regular lab work.     Date of Procedure: 7/17/2019      Procedure: Procedure(s) (LRB):  DECOMPRESSION AND FUSION, SPINE, CERVICAL, ANTERIOR APPROACH (N/A)  CORPECTOMY, SPINE, CERVICAL         Review of Systems   Constitution: Positive for weight gain. Negative for fever.   HENT: Negative for congestion.    Respiratory: Negative for cough and wheezing.    Skin: Negative for poor wound healing.   Musculoskeletal: Positive for neck pain. Negative for falls, joint pain, muscle weakness and myalgias.   Gastrointestinal: Negative for abdominal pain, bowel incontinence, diarrhea, nausea and vomiting.   Genitourinary: Negative for bladder incontinence.   Neurological: Negative for numbness and seizures.   Psychiatric/Behavioral: Negative for altered mental status.         Objective:            General    Constitutional: He is oriented to person, place, and time. He appears well-developed and well-nourished.   Neck: Normal range of motion.   Cardiovascular: Normal rate and regular rhythm.    Pulmonary/Chest: Effort normal.   Abdominal: Soft.   Neurological: He is alert and oriented to person, place, and time.   Psychiatric: He has a normal mood and affect. His behavior is normal.             Neurosurgery Exam:    MAEW  Trachea midline  Tolerating  diet well  Sensation intact to light touch- BUE  Incision on anterior neck CDI  No erythema, fluctuance, swelling   Dermabond still in place                Assessment:       Encounter Diagnoses   Name Primary?    Spondylodiscitis Yes    Status post cervical spinal fusion           Plan:       Alex was seen today for post-op evaluation, cervical spine pain (c-spine) and follow-up.    Diagnoses and all orders for this visit:    Spondylodiscitis  -     X-Ray Cervical Spine AP And Lateral; Future    Status post cervical spinal fusion  -     X-Ray Cervical Spine AP And Lateral; Future          Keep [place] collar in place at all times, except if taking shower  Patient will get x-rays on way out today  He will Follow-up with ID later today  He was instructed to Return to our clinic in 4 weeks for reassessment and repeat x-rays        Neelam Bhat PA-C

## 2019-08-20 LAB — FUNGUS SPEC CULT: NORMAL

## 2019-09-12 ENCOUNTER — TELEPHONE (OUTPATIENT)
Dept: NEUROSURGERY | Facility: CLINIC | Age: 29
End: 2019-09-12

## 2019-09-12 NOTE — TELEPHONE ENCOUNTER
Left message informing pt his appt time is still the same but our location has changed we will not be at the [facility] location on tomorrow we will be at the [place] location//ns

## 2019-09-12 NOTE — TELEPHONE ENCOUNTER
Phoned pt in regards to moving ONLC appt to the [facility] location.    Pt mom answered, and stated she'll have to have him give me a call back.    Understanding verbalized.

## 2019-09-13 ENCOUNTER — TELEPHONE (OUTPATIENT)
Dept: NEUROSURGERY | Facility: CLINIC | Age: 29
End: 2019-09-13

## 2019-09-13 ENCOUNTER — HOSPITAL ENCOUNTER (OUTPATIENT)
Dept: RADIOLOGY | Facility: HOSPITAL | Age: 29
Discharge: HOME | End: 2019-09-13
Attending: PHYSICIAN ASSISTANT
Payer: MEDICAID

## 2019-09-13 DIAGNOSIS — M46.40 SPONDYLODISCITIS: ICD-10-CM

## 2019-09-13 DIAGNOSIS — Z98.1 STATUS POST CERVICAL SPINAL FUSION: ICD-10-CM

## 2019-09-13 DIAGNOSIS — Z98.1 STATUS POST CERVICAL SPINAL FUSION: Primary | ICD-10-CM

## 2019-09-13 PROCEDURE — 72040 XR CERVICAL SPINE AP LATERAL: ICD-10-PCS | Mod: 26,,, | Performed by: RADIOLOGY

## 2019-09-13 PROCEDURE — 72040 X-RAY EXAM NECK SPINE 2-3 VW: CPT | Mod: 26,,, | Performed by: RADIOLOGY

## 2019-09-13 PROCEDURE — 72040 X-RAY EXAM NECK SPINE 2-3 VW: CPT | Mod: TC

## 2019-09-13 NOTE — TELEPHONE ENCOUNTER
Called patient at # noted in demographic, left message to return to call to set up appt for an eval for follow up appt  ----- Message from El Pineda MA sent at 9/13/2019 10:35 AM CDT -----  Contact patient to reschedule follow appt w/ MD Jeong at [facility]

## 2019-09-19 LAB
ACID FAST MOD KINY STN SPEC: NORMAL
MYCOBACTERIUM SPEC QL CULT: NORMAL

## 2019-09-21 NOTE — ED NOTES
md aware of pt's temp. Pt states that he will take tylenol for fever when he gets home.   
md notified of pt's temp.   
English

## 2019-09-23 ENCOUNTER — TELEPHONE (OUTPATIENT)
Dept: NEUROSURGERY | Facility: CLINIC | Age: 29
End: 2019-09-23

## 2019-09-23 NOTE — TELEPHONE ENCOUNTER
Spoke with pt in reference to message below, pt is scheduled for 9/25/19 @1330//ns    ----- Message from Ryland Gregory sent at 9/23/2019  4:06 PM CDT -----  Contact: pt   Pt was calling back previously speaking with nurse. Pt is able to get ride and would like to setup an appt.         [phone]

## 2019-09-23 NOTE — TELEPHONE ENCOUNTER
Phoned pt in regards to message below.    Pt needs to be seen by Neelam for a f/u appt    Pt informed me that he'll call back once he has a ride.    Understanding verbalized    ----- Message from Alejandrina Odonnell sent at 9/23/2019  1:09 PM CDT -----  Contact: Patient  Patient needs to make follow up appt, but there are no openings, please call him back at [phone]. Thank you

## 2019-09-25 ENCOUNTER — OFFICE VISIT (OUTPATIENT)
Dept: NEUROSURGERY | Facility: CLINIC | Age: 29
End: 2019-09-25
Payer: MEDICAID

## 2019-09-25 VITALS
DIASTOLIC BLOOD PRESSURE: 69 MMHG | BODY MASS INDEX: 23.36 KG/M2 | WEIGHT: 154.13 LBS | HEIGHT: 68 IN | HEART RATE: 111 BPM | SYSTOLIC BLOOD PRESSURE: 109 MMHG

## 2019-09-25 DIAGNOSIS — Z98.1 STATUS POST CERVICAL SPINAL FUSION: Primary | ICD-10-CM

## 2019-09-25 DIAGNOSIS — M46.40 SPONDYLODISCITIS: ICD-10-CM

## 2019-09-25 PROCEDURE — 99024 PR POST-OP FOLLOW-UP VISIT: ICD-10-PCS | Mod: ,,, | Performed by: PHYSICIAN ASSISTANT

## 2019-09-25 PROCEDURE — 99024 POSTOP FOLLOW-UP VISIT: CPT | Mod: ,,, | Performed by: PHYSICIAN ASSISTANT

## 2019-09-25 PROCEDURE — 99213 OFFICE O/P EST LOW 20 MIN: CPT | Mod: PBBFAC | Performed by: PHYSICIAN ASSISTANT

## 2019-09-25 PROCEDURE — 99999 PR PBB SHADOW E&M-EST. PATIENT-LVL III: ICD-10-PCS | Mod: PBBFAC,,, | Performed by: PHYSICIAN ASSISTANT

## 2019-09-25 PROCEDURE — 99999 PR PBB SHADOW E&M-EST. PATIENT-LVL III: CPT | Mod: PBBFAC,,, | Performed by: PHYSICIAN ASSISTANT

## 2019-09-25 RX ORDER — OXYCODONE AND ACETAMINOPHEN 10; 325 MG/1; MG/1
1 TABLET ORAL EVERY 8 HOURS PRN
Qty: 30 TABLET | Refills: 0 | Status: SHIPPED | OUTPATIENT
Start: 2019-09-25 | End: 2019-11-20

## 2019-09-25 NOTE — PROGRESS NOTES
Subjective:      Patient ID: Alex Soares is a 28 y.o. male.    Chief Complaint: Post-op Evaluation and Cervical Spine Pain (C-spine)    HPI   The patient is seen today for postop evaluation . This is his second visit since surgery. He was supposed to follow-up before this but just had x-rays and went home.  He does c/o neck pain at night but this is not worse. Patient has been careful since surgery- no reported falls. He denies fevers and incision drainage. Patient is tolerating a regular diet.      He has noticed that his hands do not shake as before his surgery but he has noticed some numbness on tips of his fingers (both hands). He notices this issue only with tattooing or drawing. Patient reports this has been ongoing since surgery but he did not report these findings until today. Patient states all of his sensation has returned since surgery. States he cannot lift anything greater than 15 lbs at this point.     Patient is only taking Tylenol for the pain. States he ran out of the pain medication (Percocet) over a week ago. He has no bladder or bowel issues. Patient is still smoking which is against our recommendations given his surgery. He smokes up to a pack per day.      Date of Procedure: 7/17/2019    Procedure: Procedure(s) (LRB):  DECOMPRESSION AND FUSION, SPINE, CERVICAL, ANTERIOR APPROACH (N/A)  CORPECTOMY, SPINE, CERVICAL           Review of Systems   Constitution: Negative for fever.   HENT: Negative for congestion.    Respiratory: Negative for cough and wheezing.    Skin: Negative for poor wound healing.   Musculoskeletal: Positive for neck pain. Negative for back pain, falls, joint pain and myalgias.   Gastrointestinal: Negative for abdominal pain, bowel incontinence, diarrhea, nausea and vomiting.   Genitourinary: Negative for bladder incontinence.   Neurological: Positive for numbness. Negative for seizures.   Psychiatric/Behavioral: Negative for altered mental status.         Objective:             Ortho/SPM Exam      General     Constitutional: He is oriented to person, place, and time. He appears well-developed and well-nourished.   Neck: Normal range of motion.   Cardiovascular: Normal rate and regular rhythm.    Pulmonary/Chest: Effort normal.   Abdominal: Soft.   Neurological: He is alert and oriented to person, place, and time.   Psychiatric: He has a normal mood and affect. His behavior is normal.                  Neurosurgery Exam:    Trachea midline  Neck is supple  Tolerating diet well  Sensation intact to light touch- BUE  Incision on anterior neck CDI  No erythema, fluctuance, swelling   Moves all 4 extremities well  No pain with neck flexion or extension  No pain on exam.           Imaging:  Narrative     EXAMINATION:  XR CERVICAL SPINE AP LATERAL    CLINICAL HISTORY:  Discitis, unspecified, site unspecified    TECHNIQUE:  AP, lateral and open mouth views of the cervical spine were performed.    COMPARISON:  C-spine radiographs from August 2019.    FINDINGS:  Unchanged appearance of C6 and C7 compression deformities.  Postoperative changes within fusion of the lower anterior cervical spine with plate and screw construct again noted spanning C5-T1.  Hardware is unchanged.  No evidence of hardware complication.  Odontoid is intact.  Lateral masses are symmetric.  No significant change since the comparison study.      Impression       As above       Assessment:       Encounter Diagnoses   Name Primary?    Spondylodiscitis     Status post cervical spinal fusion Yes          Plan:       Alex was seen today for post-op evaluation and cervical spine pain (c-spine).    Diagnoses and all orders for this visit:    Status post cervical spinal fusion  -     CT Cervical Spine Without Contrast; Future    Spondylodiscitis  -     CT Cervical Spine Without Contrast; Future    Other orders  -     oxyCODONE-acetaminophen (PERCOCET)  mg per tablet; Take 1 tablet by mouth every 8 (eight) hours as  needed for Pain.      Patient is doing well following anterior corpectomy/cervical spine fusion.  I counseled patient regarding smoking cessation today. He states that he is aware that he should not smoke but he cannot quit.   He will continue to wear his cervical collar at all times except when taking a shower.  He was reminded to follow-up with Dr. Garcia- he does not have an appointment scheduled with him. He has seen him for postop once or twice since surgery.  Patient was provided with a Rx for Percocet today.   Patient will be scheduled for CT scan of cervical spine in 4 weeks and follow-up with Dr. Jeong after this study.           Neelam Bhat PA-C

## 2019-10-03 DIAGNOSIS — R93.7 ABNORMAL X-RAY OF CERVICAL SPINE: ICD-10-CM

## 2019-10-04 ENCOUNTER — HOSPITAL ENCOUNTER (OUTPATIENT)
Dept: RADIOLOGY | Facility: HOSPITAL | Age: 29
Discharge: HOME | End: 2019-10-04
Attending: NEUROLOGICAL SURGERY
Payer: MEDICAID

## 2019-10-04 DIAGNOSIS — R93.7 ABNORMAL X-RAY OF CERVICAL SPINE: ICD-10-CM

## 2019-10-04 PROCEDURE — 72125 CT NECK SPINE W/O DYE: CPT | Mod: TC,PO

## 2019-10-04 PROCEDURE — 72125 CT NECK SPINE W/O DYE: CPT | Mod: 26,,, | Performed by: RADIOLOGY

## 2019-10-04 PROCEDURE — 72125 CT CERVICAL SPINE WITHOUT CONTRAST: ICD-10-PCS | Mod: 26,,, | Performed by: RADIOLOGY

## 2019-11-20 ENCOUNTER — HOSPITAL ENCOUNTER (OUTPATIENT)
Dept: RADIOLOGY | Facility: HOSPITAL | Age: 29
Discharge: HOME | End: 2019-11-20
Attending: NEUROLOGICAL SURGERY
Payer: MEDICAID

## 2019-11-20 ENCOUNTER — OFFICE VISIT (OUTPATIENT)
Dept: NEUROSURGERY | Facility: CLINIC | Age: 29
End: 2019-11-20
Payer: MEDICAID

## 2019-11-20 VITALS
HEIGHT: 68 IN | RESPIRATION RATE: 18 BRPM | HEART RATE: 74 BPM | WEIGHT: 149.06 LBS | DIASTOLIC BLOOD PRESSURE: 69 MMHG | SYSTOLIC BLOOD PRESSURE: 123 MMHG | BODY MASS INDEX: 22.59 KG/M2

## 2019-11-20 DIAGNOSIS — M46.40 SPONDYLODISCITIS: Primary | ICD-10-CM

## 2019-11-20 DIAGNOSIS — Z98.1 STATUS POST CERVICAL SPINAL FUSION: ICD-10-CM

## 2019-11-20 DIAGNOSIS — M46.40 SPONDYLODISCITIS: ICD-10-CM

## 2019-11-20 DIAGNOSIS — Z98.1 STATUS POST CERVICAL SPINAL FUSION: Primary | ICD-10-CM

## 2019-11-20 PROCEDURE — 99213 OFFICE O/P EST LOW 20 MIN: CPT | Mod: S$PBB,,, | Performed by: NEUROLOGICAL SURGERY

## 2019-11-20 PROCEDURE — 72040 X-RAY EXAM NECK SPINE 2-3 VW: CPT | Mod: TC

## 2019-11-20 PROCEDURE — 99999 PR PBB SHADOW E&M-EST. PATIENT-LVL III: CPT | Mod: PBBFAC,,, | Performed by: NEUROLOGICAL SURGERY

## 2019-11-20 PROCEDURE — 72040 XR CERVICAL SPINE 2 OR 3 VIEWS: ICD-10-PCS | Mod: 26,,, | Performed by: RADIOLOGY

## 2019-11-20 PROCEDURE — 99213 PR OFFICE/OUTPT VISIT, EST, LEVL III, 20-29 MIN: ICD-10-PCS | Mod: S$PBB,,, | Performed by: NEUROLOGICAL SURGERY

## 2019-11-20 PROCEDURE — 99999 PR PBB SHADOW E&M-EST. PATIENT-LVL III: ICD-10-PCS | Mod: PBBFAC,,, | Performed by: NEUROLOGICAL SURGERY

## 2019-11-20 PROCEDURE — 72040 X-RAY EXAM NECK SPINE 2-3 VW: CPT | Mod: 26,,, | Performed by: RADIOLOGY

## 2019-11-20 PROCEDURE — 99213 OFFICE O/P EST LOW 20 MIN: CPT | Mod: PBBFAC,25 | Performed by: NEUROLOGICAL SURGERY

## 2019-11-20 RX ORDER — METHOCARBAMOL 750 MG/1
750 TABLET, FILM COATED ORAL 3 TIMES DAILY PRN
Qty: 60 TABLET | Refills: 0 | Status: SHIPPED | OUTPATIENT
Start: 2019-11-20

## 2019-11-20 RX ORDER — OXYCODONE AND ACETAMINOPHEN 10; 325 MG/1; MG/1
1 TABLET ORAL EVERY 6 HOURS PRN
Qty: 30 TABLET | Refills: 0 | Status: SHIPPED | OUTPATIENT
Start: 2019-11-20 | End: 2020-01-22 | Stop reason: SDUPTHER

## 2019-11-20 NOTE — PROGRESS NOTES
Subjective:      Patient ID: Alex Soares is a 28 y.o. male.    Chief Complaint: Post-op Evaluation (#2 DECOMPRESSION AND FUSION, SPINE, CERVICAL 7/17/19)    HPI   Patient's approximately 3 months status post 2 level corpectomy for osteomyelitis with deformity  He is doing well overall  Last time he was here he had a CT scan which showed that the cage had some subsidence into the bone  We have been monitoring a clinically  He has axial neck pain which has been stable  No pain into the upper extremities  He has some residual numbness to the fingertips which is not gone away after the operation  Overall he feels much better and is not have any other major problems       Review of Systems   Constitution: Negative for fever.   HENT: Negative for congestion.    Respiratory: Negative for cough and wheezing.    Skin: Negative for poor wound healing.   Musculoskeletal: Positive for neck pain. Negative for back pain, falls, joint pain and myalgias.   Gastrointestinal: Negative for abdominal pain, bowel incontinence, diarrhea, nausea and vomiting.   Genitourinary: Negative for bladder incontinence.   Neurological: Positive for numbness. Negative for seizures.   Psychiatric/Behavioral: Negative for altered mental status.         Objective:            Ortho/SPM Exam      General     Constitutional: He is oriented to person, place, and time. He appears well-developed and well-nourished.   Neck: Normal range of motion.   Cardiovascular: Normal rate and regular rhythm.    Pulmonary/Chest: Effort normal.   Abdominal: Soft.   Neurological: He is alert and oriented to person, place, and time.   Psychiatric: He has a normal mood and affect. His behavior is normal.              Nursing note and vitals reviewed  Gen:Oriented to person, place, and time.             Appears stated age   Head:Normocephalic and atraumatic.  Nose: Nose normal.    Eyes: EOM are normal. Pupils are equal, round, and reactive to light.   Neck: Neck supple.  No masses or lesions palpated  Cardiovascular: Intact distal pulses.    Abdominal: Soft.   Neurological: Alert and oriented to person, place, and time.  No cranial nerve deficit.  Coordination normal. Normal muscle tone  Psychiatric: Normal mood and affect. Behavior is normal.    Neck:  None  Paraspinal tenderness   None  Paraspinal muscle spasms   None  Pain with flexion and extention   WNL  Range of motion    Neg  Spurling's sign     Motor   Right Right Left Left  Level Group   5  5  C5 Deltoid   5  5  C6 Bicep   5  5   Wrist extension    5  5  C7 Triceps   5  5   Wrist flexion   5  5  C8    5  5  T1 Interossei    Sensation  NL Decreased (R/L/BL) Level Sensation    X  C5 Lateral upper arm    Numbness to the fingertips on the right C6 Thumb and index finger, lat forearm    Numbness to the fingertips on the right C7 Middle finger   X  C8 Ring and little finger   X  T1 Medial arm      Reflex  2+  Bicep tendon   2+  Brachioradialis   2+  Triceps tendon   Not present  Sanders's   none  Clonus   neg  Tinel's     X-ray done today shows no previous change from the other x-rays done    CT scan shows some subsidence into the endplate of the superior vertebral body this remained stable from the prior imaging      Assessment:       Encounter Diagnoses   Name Primary?    Spondylodiscitis Yes    Status post cervical spinal fusion           Plan:       Alex was seen today for post-op evaluation.    Diagnoses and all orders for this visit:    Spondylodiscitis  -     X-Ray Cervical Spine 2 or 3 Views; Future  -     oxyCODONE-acetaminophen (PERCOCET)  mg per tablet; Take 1 tablet by mouth every 6 (six) hours as needed for Pain.  -     methocarbamol (ROBAXIN) 750 MG Tab; Take 1 tablet (750 mg total) by mouth 3 (three) times daily as needed (muscle spasms).    Status post cervical spinal fusion  -     X-Ray Cervical Spine 2 or 3 Views; Future  -     oxyCODONE-acetaminophen (PERCOCET)  mg per tablet; Take 1 tablet  by mouth every 6 (six) hours as needed for Pain.  -     methocarbamol (ROBAXIN) 750 MG Tab; Take 1 tablet (750 mg total) by mouth 3 (three) times daily as needed (muscle spasms).     Patient is done well overall  He does take pain medication on occasion when he needs it  I will refill this today for him  His strength has returned to baseline  He has some stable numbness and tingling  Overall pleased with his progress  I do want to see him back with an x-ray to make sure the cage remains stable    Thank you for the referral   Please call with any questions    Sánchez Jeong MD  Neurosurgery

## 2019-11-21 ENCOUNTER — TELEPHONE (OUTPATIENT)
Dept: NEUROSURGERY | Facility: CLINIC | Age: 29
End: 2019-11-21

## 2019-11-21 NOTE — TELEPHONE ENCOUNTER
Spoke w/ patient advise patient MD Jeong is currently out of the office due to surgery day.      Patient states he fell asleep and last night and dog ate whole bottle oxyCODONE-acetaminophen (PERCOCET)  mg awaken this morning took [facility] to [facility] to get stomach.       Inform patient that a copy of [facility] bill & note would be needed from [facility] indicating this transpired.     However not promising MD Jeong will refill but will be at his discretion.    Patient verbalized understanding of discussion.         ----- Message from Jodie Laughlin sent at 11/21/2019 12:24 PM CST -----  Type:  Needs Medical Advice    Who Called:  Pt Alex  Symptoms (please be specific):   Pain medication   How long has patient had these symptoms:     Pharmacy name and phone #:   [facility] in [place], [place]  Would the patient rather a call back or a response via [facility]?   Call back  Best Call Back Number:   [phone]  Additional Information:   States he saw Dr Jeong yesterday//he was given a prescription for med//Oxycodone//last night his dog chewed up the bottle and he lost all his medication//he had to bring the dog to the [facility] to get his stomach pumped//he is calling to ask if possible for  to give him a refill//please call to discuss//thanks/el

## 2019-12-11 ENCOUNTER — TELEPHONE (OUTPATIENT)
Dept: NEUROSURGERY | Facility: CLINIC | Age: 29
End: 2019-12-11

## 2019-12-11 NOTE — TELEPHONE ENCOUNTER
Patient has an upcoming appt with Neurosurgery (Neelam Bhat PA-C) 01/22/2020 at 10:30 AM    Patient verbalized understanding.      ----- Message from Rupali Maria sent at 12/11/2019  2:34 PM CST -----  Contact: self  Type:  Sooner Apoointment Request    Caller is requesting a sooner appointment.  Caller declined first available appointment listed below.  Caller will not accept being placed on the waitlist and is requesting a message be sent to doctor.  Name of Caller:pt  When is the first available appointment?n/a  Symptoms:n/a  Would the patient rather a call back or a response via [facility]? Call back  Best Call Back Number:[phone]  Additional Information: pt needs appt for med refill

## 2020-01-22 ENCOUNTER — OFFICE VISIT (OUTPATIENT)
Dept: NEUROSURGERY | Facility: CLINIC | Age: 30
End: 2020-01-22
Payer: MEDICAID

## 2020-01-22 ENCOUNTER — HOSPITAL ENCOUNTER (OUTPATIENT)
Dept: RADIOLOGY | Facility: HOSPITAL | Age: 30
Discharge: HOME | End: 2020-01-22
Attending: NEUROLOGICAL SURGERY
Payer: MEDICAID

## 2020-01-22 VITALS
DIASTOLIC BLOOD PRESSURE: 98 MMHG | BODY MASS INDEX: 22.35 KG/M2 | HEART RATE: 76 BPM | SYSTOLIC BLOOD PRESSURE: 147 MMHG | WEIGHT: 147.5 LBS | HEIGHT: 68 IN

## 2020-01-22 DIAGNOSIS — Z98.1 STATUS POST CERVICAL SPINAL FUSION: Primary | ICD-10-CM

## 2020-01-22 DIAGNOSIS — M46.40 SPONDYLODISCITIS: ICD-10-CM

## 2020-01-22 DIAGNOSIS — Z98.1 STATUS POST CERVICAL SPINAL FUSION: ICD-10-CM

## 2020-01-22 PROCEDURE — 99999 PR PBB SHADOW E&M-EST. PATIENT-LVL III: ICD-10-PCS | Mod: PBBFAC,,, | Performed by: PHYSICIAN ASSISTANT

## 2020-01-22 PROCEDURE — 72040 X-RAY EXAM NECK SPINE 2-3 VW: CPT | Mod: TC

## 2020-01-22 PROCEDURE — 72040 X-RAY EXAM NECK SPINE 2-3 VW: CPT | Mod: 26,,, | Performed by: RADIOLOGY

## 2020-01-22 PROCEDURE — 99213 PR OFFICE/OUTPT VISIT, EST, LEVL III, 20-29 MIN: ICD-10-PCS | Mod: S$PBB,,, | Performed by: PHYSICIAN ASSISTANT

## 2020-01-22 PROCEDURE — 99213 OFFICE O/P EST LOW 20 MIN: CPT | Mod: PBBFAC,25 | Performed by: PHYSICIAN ASSISTANT

## 2020-01-22 PROCEDURE — 72040 XR CERVICAL SPINE 2 OR 3 VIEWS: ICD-10-PCS | Mod: 26,,, | Performed by: RADIOLOGY

## 2020-01-22 PROCEDURE — 99213 OFFICE O/P EST LOW 20 MIN: CPT | Mod: S$PBB,,, | Performed by: PHYSICIAN ASSISTANT

## 2020-01-22 PROCEDURE — 99999 PR PBB SHADOW E&M-EST. PATIENT-LVL III: CPT | Mod: PBBFAC,,, | Performed by: PHYSICIAN ASSISTANT

## 2020-01-22 RX ORDER — OXYCODONE AND ACETAMINOPHEN 10; 325 MG/1; MG/1
1 TABLET ORAL EVERY 6 HOURS PRN
Qty: 30 TABLET | Refills: 0 | Status: SHIPPED | OUTPATIENT
Start: 2020-01-22

## 2020-01-22 NOTE — PROGRESS NOTES
Subjective:      Patient ID: Alex Soares is a 29 y.o. male.    Chief Complaint: No chief complaint on file.    HPI     The patient is here today for follow-up of cervical spine (ACDF). He is status post 2 level corpectomy for osteomyelitis with deformity (7/17/19).   His pain seems to increase at night but is usually pretty tolerable during the daytime. He reports one fall two weeks ago-it was raining and he slipped on the bottom step but caught himself to prevent impact on his neck.   X-rays were taken today.         ROS     Constitution: Negative for fever.   HENT: Negative for congestion.    Respiratory: Negative for cough and wheezing.    Skin: Negative for poor wound healing.   Musculoskeletal: Positive for neck pain. Negative for back pain, falls, joint pain and myalgias.   Gastrointestinal: Negative for abdominal pain, bowel incontinence, diarrhea, nausea and vomiting.   Genitourinary: Negative for bladder incontinence.   Neurological:  Negative for seizures, numbness and tingling.   Psychiatric/Behavioral: Negative for altered mental status.          Objective:            Ortho/SPM Exam    Nursing note and vitals reviewed  Gen:Oriented to person, place, and time.             Appears stated age   Head:Normocephalic and atraumatic.  Nose: Nose normal.    Eyes: EOM are normal. Pupils are equal, round, and reactive to light.   Neck: Neck supple. No masses or lesions palpated  Cardiovascular: Intact distal pulses.    Abdominal: Soft.   Neurological: Alert and oriented to person, place, and time.  No cranial nerve deficit.  Coordination normal. Normal muscle tone  Psychiatric: Normal mood and affect. Behavior is normal.     Neck:  None   Paraspinal tenderness   None   Paraspinal muscle spasms   None   Pain with flexion and extention   WNL   Range of motion       Motor 5/5 serge upper ext   Right Right Left Left  Level Group   5   5   C5 Deltoid   5   5   C6 Bicep   5   5     Wrist extension    5   5   C7  Triceps   5   5     Wrist flexion   5   5   C8    5   5   T1 Interossei    Sensation  NL Decreased (R/L/BL) Level Sensation    X   C5 Lateral upper arm   x  C6 Thumb and index finger, lat forearm    x  C7 Middle finger   X   C8 Ring and little finger   X   T1 Medial arm                 Reflex  2+   Bicep tendon   2+   Brachioradialis   2+   Triceps tendon   Not present   Sanders's   none   Clonus   neg   Tinel's           Imaging:    Reading Physician Reading Date Result Priority   Solo Macdonald DO 1/22/2020 Routine      Narrative     EXAMINATION:  XR CERVICAL SPINE 2 OR 3 VIEWS    CLINICAL HISTORY:  Arthrodesis status    TECHNIQUE:  AP, lateral and open mouth views of the cervical spine were performed.    COMPARISON:  11/20/2019    FINDINGS:  ACDF changes noted at the C5 through T1 levels with intradiscal spacers present at C5-6 and C7-T1.  No hardware failure or loosening suggested.  Stable appearance of destruction of the endplates at the C6-C7 levels at the site of previously noted osteomyelitis.  Mild disc space narrowing and spondylosis present at the C4-5 level.      Impression       As above             Assessment:       Encounter Diagnosis   Name Primary?    Status post cervical spinal fusion Yes          Plan:       Diagnoses and all orders for this visit:    Status post cervical spinal fusion  -     X-Ray Cervical Spine AP Lat with Flex Ex; Future      Patient continues to do well. X-rays taken today do not indicate any changes in hardware. Cage appears stable.     I would like to see patient back in 6 months for follow-up x-rays to make sure cage is still in same position.     I will send his medication to the pharmacy however this is the last time. Patient will be notified. All other requests will have to go through pain mgt.           Neelam Bhat PA-C

## 2020-02-05 ENCOUNTER — TELEPHONE (OUTPATIENT)
Dept: NEUROSURGERY | Facility: CLINIC | Age: 30
End: 2020-02-05

## 2020-02-05 NOTE — TELEPHONE ENCOUNTER
Phoned pt in regards to getting him scheduled for his 6 month f/u appt/ XRAY appt.    Pt has been informed that a recall date will be set for these appts on 9/5/2020 anytime after 10:30 AM    Understanding verbalized.

## 2021-06-22 ENCOUNTER — HOSPITAL ENCOUNTER (EMERGENCY)
Facility: HOSPITAL | Age: 31
Discharge: HOME | End: 2021-06-22
Attending: EMERGENCY MEDICINE
Payer: MEDICAID

## 2021-06-22 VITALS
SYSTOLIC BLOOD PRESSURE: 142 MMHG | HEART RATE: 89 BPM | WEIGHT: 154.63 LBS | DIASTOLIC BLOOD PRESSURE: 89 MMHG | RESPIRATION RATE: 18 BRPM | TEMPERATURE: 99 F | OXYGEN SATURATION: 98 % | BODY MASS INDEX: 23.51 KG/M2
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
## 2021-06-22 DIAGNOSIS — S01.312A LACERATION OF AURICLE OF LEFT EAR, INITIAL ENCOUNTER: Primary | ICD-10-CM

## 2021-06-22 PROCEDURE — 63600175 PHARM REV CODE 636 W HCPCS: Mod: ER | Performed by: EMERGENCY MEDICINE

## 2021-06-22 PROCEDURE — 90471 IMMUNIZATION ADMIN: CPT | Mod: ER | Performed by: EMERGENCY MEDICINE

## 2021-06-22 PROCEDURE — 99284 EMERGENCY DEPT VISIT MOD MDM: CPT | Mod: 25,ER

## 2021-06-22 PROCEDURE — 25000003 PHARM REV CODE 250: Mod: ER | Performed by: EMERGENCY MEDICINE

## 2021-06-22 PROCEDURE — 90715 TDAP VACCINE 7 YRS/> IM: CPT | Mod: ER | Performed by: EMERGENCY MEDICINE

## 2021-06-22 PROCEDURE — 12013 RPR F/E/E/N/L/M 2.6-5.0 CM: CPT | Mod: ER

## 2021-06-22 RX ORDER — LIDOCAINE HYDROCHLORIDE 10 MG/ML
20 INJECTION, SOLUTION EPIDURAL; INFILTRATION; INTRACAUDAL; PERINEURAL
Status: COMPLETED | OUTPATIENT
Start: 2021-06-22 | End: 2021-06-22

## 2021-06-22 RX ORDER — NAPROXEN 500 MG/1
500 TABLET ORAL
Status: COMPLETED | OUTPATIENT
Start: 2021-06-22 | End: 2021-06-22

## 2021-06-22 RX ORDER — SULFAMETHOXAZOLE AND TRIMETHOPRIM 800; 160 MG/1; MG/1
1 TABLET ORAL
Status: COMPLETED | OUTPATIENT
Start: 2021-06-22 | End: 2021-06-22

## 2021-06-22 RX ORDER — NAPROXEN 500 MG/1
500 TABLET ORAL 2 TIMES DAILY WITH MEALS
Qty: 60 TABLET | Refills: 0 | Status: SHIPPED | OUTPATIENT
Start: 2021-06-22

## 2021-06-22 RX ORDER — SULFAMETHOXAZOLE AND TRIMETHOPRIM 800; 160 MG/1; MG/1
1 TABLET ORAL 2 TIMES DAILY
Qty: 14 TABLET | Refills: 0 | Status: SHIPPED | OUTPATIENT
Start: 2021-06-22 | End: 2021-06-29

## 2021-06-22 RX ADMIN — NAPROXEN 500 MG: 500 TABLET ORAL at 08:06

## 2021-06-22 RX ADMIN — SULFAMETHOXAZOLE AND TRIMETHOPRIM 1 TABLET: 800; 160 TABLET ORAL at 08:06

## 2021-06-22 RX ADMIN — LIDOCAINE HYDROCHLORIDE 200 MG: 10 INJECTION, SOLUTION EPIDURAL; INFILTRATION; INTRACAUDAL at 08:06

## 2021-06-22 RX ADMIN — CLOSTRIDIUM TETANI TOXOID ANTIGEN (FORMALDEHYDE INACTIVATED), CORYNEBACTERIUM DIPHTHERIAE TOXOID ANTIGEN (FORMALDEHYDE INACTIVATED), BORDETELLA PERTUSSIS TOXOID ANTIGEN (GLUTARALDEHYDE INACTIVATED), BORDETELLA PERTUSSIS FILAMENTOUS HEMAGGLUTININ ANTIGEN (FORMALDEHYDE INACTIVATED), BORDETELLA PERTUSSIS PERTACTIN ANTIGEN, AND BORDETELLA PERTUSSIS FIMBRIAE 2/3 ANTIGEN 0.5 ML: 5; 2; 2.5; 5; 3; 5 INJECTION, SUSPENSION INTRAMUSCULAR at 08:06

## 2022-05-16 ENCOUNTER — HOSPITAL ENCOUNTER (OUTPATIENT)
Dept: RADIOLOGY | Facility: HOSPITAL | Age: 32
Discharge: HOME | End: 2022-05-16
Attending: NURSE PRACTITIONER
Payer: MEDICAID

## 2022-05-16 DIAGNOSIS — M43.22 CERVICAL VERTEBRAL FUSION: ICD-10-CM

## 2022-05-16 DIAGNOSIS — M43.22 CERVICAL VERTEBRAL FUSION: Primary | ICD-10-CM

## 2022-05-16 DIAGNOSIS — M54.9 DORSALGIA: Primary | ICD-10-CM

## 2022-05-16 DIAGNOSIS — M54.9 DORSALGIA: ICD-10-CM

## 2022-05-16 PROCEDURE — 72070 X-RAY EXAM THORAC SPINE 2VWS: CPT | Mod: TC,PO

## 2022-05-16 PROCEDURE — 72070 X-RAY EXAM THORAC SPINE 2VWS: CPT | Mod: 26,,, | Performed by: RADIOLOGY

## 2022-05-16 PROCEDURE — 72040 XR CERVICAL SPINE 2 OR 3 VIEWS: ICD-10-PCS | Mod: 26,,, | Performed by: RADIOLOGY

## 2022-05-16 PROCEDURE — 72040 X-RAY EXAM NECK SPINE 2-3 VW: CPT | Mod: TC,PO

## 2022-05-16 PROCEDURE — 72070 XR THORACIC SPINE AP LATERAL: ICD-10-PCS | Mod: 26,,, | Performed by: RADIOLOGY

## 2022-05-16 PROCEDURE — 72040 X-RAY EXAM NECK SPINE 2-3 VW: CPT | Mod: 26,,, | Performed by: RADIOLOGY

## 2022-09-15 ENCOUNTER — HOSPITAL ENCOUNTER (OUTPATIENT)
Dept: RADIOLOGY | Facility: HOSPITAL | Age: 32
Discharge: HOME | End: 2022-09-15
Attending: NURSE PRACTITIONER
Payer: MEDICAID

## 2022-09-15 DIAGNOSIS — M62.838 SPASM OF MUSCLE: ICD-10-CM

## 2022-09-15 DIAGNOSIS — M43.22 CERVICAL VERTEBRAL FUSION: Primary | ICD-10-CM

## 2022-09-15 DIAGNOSIS — M54.2 CERVICALGIA: ICD-10-CM

## 2022-09-15 DIAGNOSIS — M54.9 DORSALGIA: ICD-10-CM

## 2022-09-15 DIAGNOSIS — M79.2 PAROXYSMAL NERVE PAIN: ICD-10-CM

## 2022-09-15 DIAGNOSIS — M43.22 CERVICAL VERTEBRAL FUSION: ICD-10-CM

## 2022-09-15 PROCEDURE — 72070 XR THORACIC SPINE AP LATERAL: ICD-10-PCS | Mod: 26,,, | Performed by: RADIOLOGY

## 2022-09-15 PROCEDURE — 72070 X-RAY EXAM THORAC SPINE 2VWS: CPT | Mod: TC,PO

## 2022-09-15 PROCEDURE — 72070 X-RAY EXAM THORAC SPINE 2VWS: CPT | Mod: 26,,, | Performed by: RADIOLOGY

## 2022-09-15 PROCEDURE — 72040 X-RAY EXAM NECK SPINE 2-3 VW: CPT | Mod: TC,PO

## 2022-09-15 PROCEDURE — 72100 X-RAY EXAM L-S SPINE 2/3 VWS: CPT | Mod: 26,,, | Performed by: RADIOLOGY

## 2022-09-15 PROCEDURE — 72040 X-RAY EXAM NECK SPINE 2-3 VW: CPT | Mod: 26,,, | Performed by: RADIOLOGY

## 2022-09-15 PROCEDURE — 72100 X-RAY EXAM L-S SPINE 2/3 VWS: CPT | Mod: TC,PO

## 2022-09-15 PROCEDURE — 72040 XR CERVICAL SPINE AP LATERAL: ICD-10-PCS | Mod: 26,,, | Performed by: RADIOLOGY

## 2022-09-15 PROCEDURE — 72100 XR LUMBAR SPINE 2 OR 3 VIEWS: ICD-10-PCS | Mod: 26,,, | Performed by: RADIOLOGY

## 2022-10-03 ENCOUNTER — TELEPHONE (OUTPATIENT)
Dept: PHYSICAL MEDICINE AND REHAB | Facility: CLINIC | Age: 32
End: 2022-10-03

## 2022-10-03 ENCOUNTER — TELEPHONE (OUTPATIENT)
Dept: NEUROSURGERY | Facility: CLINIC | Age: 32
End: 2022-10-03
Payer: MEDICAID

## 2022-10-03 NOTE — TELEPHONE ENCOUNTER
First available scheduled 12.2.2022 at 10am with Neelam Bhat PA-C and placed him on the wait list. Please reach out to patient with date.

## 2022-10-03 NOTE — TELEPHONE ENCOUNTER
----- Message from Padmini Her MA sent at 10/3/2022  9:16 AM CDT -----  Regarding: no access to schedule:(((  Contact: [phone]  Anuradha Leigh can you schedule this pt a f/u appt with Neelam next available (re occurring pain per Neelam Bhat PA-C).     Thanks.   ----- Message -----  From: Neelam Bhat PA-C  Sent: 10/2/2022   9:50 PM CDT  To: Padmini Her MA    Yes, okay to schedule with us (at our next opening). Thx  ----- Message -----  From: Padmini Her MA  Sent: 9/29/2022   4:22 PM CDT  To: SONJA Arrietay I spoke with pt he stated that he had a sx 3 years ago with Dr Jeong he said he noticed some tingling in his arms and hands going numb, he recently had xrays done if you want to review them.    Do you want me to schedule him to see us or?   ----- Message -----  From: Tyler Barrow  Sent: 9/29/2022   4:17 PM CDT  To: Jean-Paul Pozo Staff    Pt is calling regarding tingling / arms and hands going numb . Please call back at [phone] . Thanks/chad

## 2023-10-25 ENCOUNTER — HOSPITAL ENCOUNTER (OUTPATIENT)
Dept: RADIOLOGY | Facility: HOSPITAL | Age: 33
Discharge: HOME | End: 2023-10-25
Attending: NURSE PRACTITIONER
Payer: MEDICAID

## 2023-10-25 DIAGNOSIS — S16.1XXS CERVICAL STRAIN, SEQUELA: ICD-10-CM

## 2023-10-25 DIAGNOSIS — M54.2 CERVICALGIA: Primary | ICD-10-CM

## 2023-10-25 DIAGNOSIS — M54.2 CERVICALGIA: ICD-10-CM

## 2023-10-25 PROCEDURE — 72040 X-RAY EXAM NECK SPINE 2-3 VW: CPT | Mod: 26,,, | Performed by: RADIOLOGY

## 2023-10-25 PROCEDURE — 72040 XR CERVICAL SPINE 2 OR 3 VIEWS: ICD-10-PCS | Mod: 26,,, | Performed by: RADIOLOGY

## 2023-10-25 PROCEDURE — 72040 X-RAY EXAM NECK SPINE 2-3 VW: CPT | Mod: TC,PO

## 2024-09-04 ENCOUNTER — HOSPITAL ENCOUNTER (EMERGENCY)
Facility: HOSPITAL | Age: 34
Discharge: HOME | End: 2024-09-04
Attending: EMERGENCY MEDICINE

## 2024-09-04 VITALS
BODY MASS INDEX: 21.73 KG/M2 | HEART RATE: 95 BPM | DIASTOLIC BLOOD PRESSURE: 68 MMHG | WEIGHT: 151.81 LBS | OXYGEN SATURATION: 99 % | TEMPERATURE: 98 F | RESPIRATION RATE: 18 BRPM | HEIGHT: 70 IN | SYSTOLIC BLOOD PRESSURE: 135 MMHG

## 2024-09-04 DIAGNOSIS — J02.9 SORE THROAT: ICD-10-CM

## 2024-09-04 DIAGNOSIS — J02.0 STREP PHARYNGITIS: Primary | ICD-10-CM

## 2024-09-04 LAB
CTP QC/QA: YES
CTP QC/QA: YES
GROUP A STREP, MOLECULAR: POSITIVE
POC MOLECULAR INFLUENZA A AGN: NEGATIVE
POC MOLECULAR INFLUENZA B AGN: NEGATIVE
SARS-COV-2 RDRP RESP QL NAA+PROBE: NEGATIVE

## 2024-09-04 PROCEDURE — 87635 SARS-COV-2 COVID-19 AMP PRB: CPT | Mod: ER | Performed by: EMERGENCY MEDICINE

## 2024-09-04 PROCEDURE — 87502 INFLUENZA DNA AMP PROBE: CPT | Mod: ER

## 2024-09-04 PROCEDURE — 99283 EMERGENCY DEPT VISIT LOW MDM: CPT | Mod: ER

## 2024-09-04 PROCEDURE — 87651 STREP A DNA AMP PROBE: CPT | Mod: ER | Performed by: EMERGENCY MEDICINE

## 2024-09-04 RX ORDER — CLINDAMYCIN HYDROCHLORIDE 150 MG/1
450 CAPSULE ORAL EVERY 8 HOURS
Qty: 45 CAPSULE | Refills: 0 | Status: SHIPPED | OUTPATIENT
Start: 2024-09-04 | End: 2024-09-09

## 2024-09-04 NOTE — ED PROVIDER NOTES
Encounter Date: 9/4/2024       History     Chief Complaint   Patient presents with    Sore Throat     That began 2 days ago w/ subjective fever, body aches, and congestion.     The history is provided by the patient.   Sore Throat  This is a new problem. The current episode started 2 days ago. The problem occurs constantly. The problem has not changed since onset.Pertinent negatives include no shortness of breath.     Review of patient's allergies indicates:   Allergen Reactions    Ceclor [cefaclor] Hives    Cefixime Hives    Cefzil [cefprozil] Hives     Past Medical History:   Diagnosis Date    Asthma      Past Surgical History:   Procedure Laterality Date    DECOMPRESSION OF CERVICAL SPINE BY ANTERIOR APPROACH WITH FUSION N/A 7/17/2019    Procedure: DECOMPRESSION AND FUSION, SPINE, CERVICAL, ANTERIOR APPROACH;  Surgeon: Sánchez Jeong MD;  Location: [facility] OR;  Service: Neurosurgery;  Laterality: N/A;    INCISION AND DRAINAGE OF ABSCESS N/A 7/17/2019    Procedure: INCISION AND DRAINAGE, ABSCESS;  Surgeon: Sánchez Jeong MD;  Location: [facility] OR;  Service: Neurosurgery;  Laterality: N/A;    NO PAST SURGERIES      PLACEMENT OF ACELLULAR HUMAN DERMAL ALLOGRAFT N/A 7/17/2019    Procedure: APPLICATION, ACELLULAR HUMAN DERMAL ALLOGRAFT;  Surgeon: Sánchez Jeong MD;  Location: [facility] OR;  Service: Neurosurgery;  Laterality: N/A;    SURGICAL REMOVAL OF VERTEBRAL BODY OF CERVICAL SPINE N/A 7/17/2019    Procedure: CORPECTOMY, SPINE, CERVICAL;  Surgeon: Sánchez Jeong MD;  Location: [facility] OR;  Service: Neurosurgery;  Laterality: N/A;  C6-7     No family history on file.  Social History     Tobacco Use    Smoking status: Every Day     Current packs/day: 1.00     Types: Cigarettes    Smokeless tobacco: Former   Substance Use Topics    Alcohol use: Yes     Comment: seldom    Drug use: Not Currently     Comment: heroin     Review of Systems   Constitutional:  Positive for chills and fatigue. Negative for fever.   HENT:   Positive for congestion and sore throat.    Respiratory:  Positive for cough. Negative for shortness of breath.    Gastrointestinal:  Negative for diarrhea, nausea and vomiting.   Genitourinary:  Negative for dysuria.   Neurological:  Negative for weakness and numbness.       Physical Exam     Initial Vitals [09/04/24 1051]   BP Pulse Resp Temp SpO2   135/68 95 18 98 °F (36.7 °C) 99 %      MAP       --         Physical Exam    Constitutional: He appears well-developed and well-nourished. No distress.   HENT:   Head: Normocephalic and atraumatic.   Mouth/Throat: Oropharyngeal exudate and posterior oropharyngeal erythema present.   Eyes: Conjunctivae are normal. Pupils are equal, round, and reactive to light.   Neck: Neck supple.   Normal range of motion.  Cardiovascular:  Normal rate, regular rhythm and normal heart sounds.           Pulmonary/Chest: Breath sounds normal.   Abdominal: Abdomen is soft. Bowel sounds are normal.   Musculoskeletal:         General: Normal range of motion.      Cervical back: Normal range of motion and neck supple.     Neurological: He is alert and oriented to person, place, and time. No cranial nerve deficit.   Skin: Skin is warm and dry.   Psychiatric: He has a normal mood and affect.         ED Course   Procedures  Labs Reviewed   GROUP A STREP, MOLECULAR - Abnormal       Result Value    Group A Strep, Molecular Positive (*)    SARS-COV-2 RDRP GENE    POC Rapid COVID Negative       Acceptable Yes     POCT INFLUENZA A/B MOLECULAR    POC Molecular Influenza A Ag Negative      POC Molecular Influenza B Ag Negative       Acceptable Yes            Imaging Results    None          Medications - No data to display  Medical Decision Making  DDx: COVID, flu, strep    Amount and/or Complexity of Data Reviewed  Labs: ordered.     Details: Strep +  Discussion of management or test interpretation with external provider(s): Rx clindamycin    Risk  Prescription drug  management.                                      Clinical Impression:  Final diagnoses:  [J02.0] Strep pharyngitis (Primary)  [J02.9] Sore throat          ED Disposition Condition    Discharge Stable          ED Prescriptions       Medication Sig Dispense Start Date End Date Auth. Provider    clindamycin (CLEOCIN) 150 MG capsule Take 3 capsules (450 mg total) by mouth every 8 (eight) hours. for 5 days 45 capsule 9/4/2024 9/9/2024 Je Foote MD          Follow-up Information       Follow up With Specialties Details Why Contact Info    Simi Macdonald MD Family Medicine   [address] [place] [address] [phone]               Je Foote MD  09/04/24 0208

## 2024-09-04 NOTE — Clinical Note
"Alex Kimsathish Soares was seen and treated in our emergency department on 9/4/2024.  He may return to work on 09/09/2024.       If you have any questions or concerns, please don't hesitate to call.      Je Foote MD"

## 2024-11-20 ENCOUNTER — HOSPITAL ENCOUNTER (EMERGENCY)
Facility: HOSPITAL | Age: 34
Discharge: HOME | End: 2024-11-20
Attending: EMERGENCY MEDICINE

## 2024-11-20 VITALS
BODY MASS INDEX: 22.06 KG/M2 | SYSTOLIC BLOOD PRESSURE: 135 MMHG | RESPIRATION RATE: 15 BRPM | DIASTOLIC BLOOD PRESSURE: 77 MMHG | OXYGEN SATURATION: 97 % | HEIGHT: 70 IN | TEMPERATURE: 98 F | HEART RATE: 94 BPM | WEIGHT: 154.13 LBS

## 2024-11-20 DIAGNOSIS — R53.83 FATIGUE: Primary | ICD-10-CM

## 2024-11-20 LAB
ALBUMIN SERPL BCP-MCNC: 3.9 G/DL (ref 3.5–5.2)
ALP SERPL-CCNC: 59 U/L (ref 40–150)
ALT SERPL W/O P-5'-P-CCNC: 34 U/L (ref 10–44)
ANION GAP SERPL CALC-SCNC: 13 MMOL/L (ref 8–16)
AST SERPL-CCNC: 37 U/L (ref 10–40)
BASOPHILS # BLD AUTO: 0.03 K/UL (ref 0–0.2)
BASOPHILS NFR BLD: 0.4 % (ref 0–1.9)
BILIRUB SERPL-MCNC: 0.6 MG/DL (ref 0.1–1)
BILIRUB UR QL STRIP: NEGATIVE
BNP SERPL-MCNC: <10 PG/ML (ref 0–99)
BUN SERPL-MCNC: 7 MG/DL (ref 6–20)
CALCIUM SERPL-MCNC: 9 MG/DL (ref 8.7–10.5)
CHLORIDE SERPL-SCNC: 103 MMOL/L (ref 95–110)
CLARITY UR REFRACT.AUTO: CLEAR
CO2 SERPL-SCNC: 20 MMOL/L (ref 23–29)
COLOR UR AUTO: YELLOW
CREAT SERPL-MCNC: 0.9 MG/DL (ref 0.5–1.4)
DIFFERENTIAL METHOD BLD: ABNORMAL
EOSINOPHIL # BLD AUTO: 0.1 K/UL (ref 0–0.5)
EOSINOPHIL NFR BLD: 1.7 % (ref 0–8)
ERYTHROCYTE [DISTWIDTH] IN BLOOD BY AUTOMATED COUNT: 12.7 % (ref 11.5–14.5)
EST. GFR  (NO RACE VARIABLE): >60 ML/MIN/1.73 M^2
GLUCOSE SERPL-MCNC: 110 MG/DL (ref 70–110)
GLUCOSE UR QL STRIP: NEGATIVE
HCT VFR BLD AUTO: 40.9 % (ref 40–54)
HEP C VIRUS HOLD SPECIMEN: NORMAL
HGB BLD-MCNC: 14.7 G/DL (ref 14–18)
HGB UR QL STRIP: ABNORMAL
IMM GRANULOCYTES # BLD AUTO: 0.02 K/UL (ref 0–0.04)
IMM GRANULOCYTES NFR BLD AUTO: 0.2 % (ref 0–0.5)
KETONES UR QL STRIP: NEGATIVE
LEUKOCYTE ESTERASE UR QL STRIP: NEGATIVE
LYMPHOCYTES # BLD AUTO: 2.8 K/UL (ref 1–4.8)
LYMPHOCYTES NFR BLD: 33.7 % (ref 18–48)
MCH RBC QN AUTO: 32.7 PG (ref 27–31)
MCHC RBC AUTO-ENTMCNC: 35.9 G/DL (ref 32–36)
MCV RBC AUTO: 91 FL (ref 82–98)
MONOCYTES # BLD AUTO: 1 K/UL (ref 0.3–1)
MONOCYTES NFR BLD: 11.7 % (ref 4–15)
NEUTROPHILS # BLD AUTO: 4.3 K/UL (ref 1.8–7.7)
NEUTROPHILS NFR BLD: 52.3 % (ref 38–73)
NITRITE UR QL STRIP: NEGATIVE
NRBC BLD-RTO: 0 /100 WBC
PH UR STRIP: 7 [PH] (ref 5–8)
PLATELET # BLD AUTO: 209 K/UL (ref 150–450)
PMV BLD AUTO: 9.4 FL (ref 9.2–12.9)
POTASSIUM SERPL-SCNC: 4 MMOL/L (ref 3.5–5.1)
PROT SERPL-MCNC: 7.2 G/DL (ref 6–8.4)
PROT UR QL STRIP: NEGATIVE
RBC # BLD AUTO: 4.49 M/UL (ref 4.6–6.2)
SODIUM SERPL-SCNC: 136 MMOL/L (ref 136–145)
SP GR UR STRIP: 1.01 (ref 1–1.03)
TROPONIN I SERPL DL<=0.01 NG/ML-MCNC: 0.01 NG/ML (ref 0–0.03)
URN SPEC COLLECT METH UR: ABNORMAL
UROBILINOGEN UR STRIP-ACNC: NEGATIVE EU/DL
WBC # BLD AUTO: 8.28 K/UL (ref 3.9–12.7)

## 2024-11-20 PROCEDURE — 93005 ELECTROCARDIOGRAM TRACING: CPT | Mod: ER

## 2024-11-20 PROCEDURE — 87389 HIV-1 AG W/HIV-1&-2 AB AG IA: CPT | Performed by: EMERGENCY MEDICINE

## 2024-11-20 PROCEDURE — 81003 URINALYSIS AUTO W/O SCOPE: CPT | Mod: ER | Performed by: EMERGENCY MEDICINE

## 2024-11-20 PROCEDURE — 87522 HEPATITIS C REVRS TRNSCRPJ: CPT | Performed by: EMERGENCY MEDICINE

## 2024-11-20 PROCEDURE — 83036 HEMOGLOBIN GLYCOSYLATED A1C: CPT | Performed by: EMERGENCY MEDICINE

## 2024-11-20 PROCEDURE — 83880 ASSAY OF NATRIURETIC PEPTIDE: CPT | Mod: ER | Performed by: EMERGENCY MEDICINE

## 2024-11-20 PROCEDURE — 93010 ELECTROCARDIOGRAM REPORT: CPT | Mod: ,,, | Performed by: STUDENT IN AN ORGANIZED HEALTH CARE EDUCATION/TRAINING PROGRAM

## 2024-11-20 PROCEDURE — 99285 EMERGENCY DEPT VISIT HI MDM: CPT | Mod: 25,ER

## 2024-11-20 PROCEDURE — 84484 ASSAY OF TROPONIN QUANT: CPT | Mod: ER | Performed by: EMERGENCY MEDICINE

## 2024-11-20 PROCEDURE — 85025 COMPLETE CBC W/AUTO DIFF WBC: CPT | Mod: ER | Performed by: EMERGENCY MEDICINE

## 2024-11-20 PROCEDURE — 86803 HEPATITIS C AB TEST: CPT | Performed by: EMERGENCY MEDICINE

## 2024-11-20 PROCEDURE — 80053 COMPREHEN METABOLIC PANEL: CPT | Mod: ER | Performed by: EMERGENCY MEDICINE

## 2024-11-20 NOTE — Clinical Note
"Alex Soares (Tyler) was seen and treated in our emergency department on 11/20/2024.  He may return to work on 11/21/2024.       If you have any questions or concerns, please don't hesitate to call.       RN    "

## 2024-11-21 LAB
ESTIMATED AVG GLUCOSE: 94 MG/DL (ref 68–131)
HBA1C MFR BLD: 4.9 % (ref 4–5.6)
HCV AB SERPL QL IA: POSITIVE
HIV 1+2 AB+HIV1 P24 AG SERPL QL IA: NEGATIVE

## 2024-11-21 NOTE — ED PROVIDER NOTES
Encounter Date: 11/20/2024       History     Chief Complaint   Patient presents with    Abnormal Lab     Elevated glucose, blood work done for job      The history is provided by the patient.   Diabetes  This is a new problem. His disease course has been fluctuating. Associated symptoms include fatigue, polydipsia, weakness and weight loss. Pertinent negatives for diabetes include no blurred vision, no chest pain, no foot paresthesias, no foot ulcerations, no polyphagia, no polyuria and no visual change. Symptoms are worsening. Pertinent negatives for hypoglycemia include no confusion, dizziness, headaches, nervousness/anxiousness, sleepiness, speech difficulty or sweats. There are no hypoglycemic complications. There are no diabetic complications. There are no known risk factors for coronary artery disease. When asked about current treatments, none were reported.   Pt reports elevated blood glucose at work, has been feeling fatigued.     Review of patient's allergies indicates:   Allergen Reactions    Ceclor [cefaclor] Hives    Cefixime Hives    Cefzil [cefprozil] Hives     Past Medical History:   Diagnosis Date    Asthma      Past Surgical History:   Procedure Laterality Date    DECOMPRESSION OF CERVICAL SPINE BY ANTERIOR APPROACH WITH FUSION N/A 7/17/2019    Procedure: DECOMPRESSION AND FUSION, SPINE, CERVICAL, ANTERIOR APPROACH;  Surgeon: Sánchez Jeong MD;  Location: [facility] OR;  Service: Neurosurgery;  Laterality: N/A;    INCISION AND DRAINAGE OF ABSCESS N/A 7/17/2019    Procedure: INCISION AND DRAINAGE, ABSCESS;  Surgeon: Sánchez Jeong MD;  Location: [facility] OR;  Service: Neurosurgery;  Laterality: N/A;    NO PAST SURGERIES      PLACEMENT OF ACELLULAR HUMAN DERMAL ALLOGRAFT N/A 7/17/2019    Procedure: APPLICATION, ACELLULAR HUMAN DERMAL ALLOGRAFT;  Surgeon: Sánchez Jeong MD;  Location: [facility] OR;  Service: Neurosurgery;  Laterality: N/A;    SURGICAL REMOVAL OF VERTEBRAL BODY OF CERVICAL SPINE N/A 7/17/2019     Procedure: CORPECTOMY, SPINE, CERVICAL;  Surgeon: Sánchez Jeong MD;  Location: [facility];  Service: Neurosurgery;  Laterality: N/A;  C6-7     No family history on file.  Social History     Tobacco Use    Smoking status: Every Day     Current packs/day: 1.00     Types: Cigarettes    Smokeless tobacco: Former   Substance Use Topics    Alcohol use: Yes     Comment: seldom    Drug use: Not Currently     Comment: heroin. hx of drug abuse 7 years ago     Review of Systems   Constitutional:  Positive for fatigue and weight loss. Negative for fever.   HENT:  Negative for sore throat.    Eyes:  Negative for blurred vision.   Respiratory:  Negative for shortness of breath.    Cardiovascular:  Negative for chest pain.   Gastrointestinal:  Negative for nausea.   Endocrine: Positive for polydipsia. Negative for polyphagia and polyuria.   Genitourinary:  Negative for dysuria.   Musculoskeletal:  Negative for back pain.   Skin:  Negative for rash.   Neurological:  Positive for weakness. Negative for dizziness, speech difficulty and headaches.   Hematological:  Does not bruise/bleed easily.   Psychiatric/Behavioral:  Negative for confusion. The patient is not nervous/anxious.        Physical Exam     Initial Vitals [11/20/24 1813]   BP Pulse Resp Temp SpO2   (!) 135/96 99 20 98.4 °F (36.9 °C) 98 %      MAP       --         Physical Exam    Nursing note and vitals reviewed.  Constitutional: He appears well-developed and well-nourished.   HENT:   Head: Normocephalic and atraumatic. Mouth/Throat: Oropharynx is clear and moist.   Eyes: Conjunctivae and EOM are normal. Pupils are equal, round, and reactive to light.   Neck: Neck supple.   Normal range of motion.  Cardiovascular:  Normal rate, regular rhythm and normal heart sounds.           Pulmonary/Chest: Breath sounds normal.   Abdominal: Abdomen is soft. Bowel sounds are normal.   Musculoskeletal:         General: Normal range of motion.      Cervical back: Normal range of  motion and neck supple.     Neurological: He is alert and oriented to person, place, and time. He has normal strength.   Skin: Skin is warm and dry.   Psychiatric: He has a normal mood and affect. Thought content normal.         ED Course   Procedures  Labs Reviewed   CBC W/ AUTO DIFFERENTIAL - Abnormal       Result Value    WBC 8.28      RBC 4.49 (*)     Hemoglobin 14.7      Hematocrit 40.9      MCV 91      MCH 32.7 (*)     MCHC 35.9      RDW 12.7      Platelets 209      MPV 9.4      Immature Granulocytes 0.2      Gran # (ANC) 4.3      Immature Grans (Abs) 0.02      Lymph # 2.8      Mono # 1.0      Eos # 0.1      Baso # 0.03      nRBC 0      Gran % 52.3      Lymph % 33.7      Mono % 11.7      Eosinophil % 1.7      Basophil % 0.4      Differential Method Automated     COMPREHENSIVE METABOLIC PANEL - Abnormal    Sodium 136      Potassium 4.0      Chloride 103      CO2 20 (*)     Glucose 110      BUN 7      Creatinine 0.9      Calcium 9.0      Total Protein 7.2      Albumin 3.9      Total Bilirubin 0.6      Alkaline Phosphatase 59      AST 37      ALT 34      eGFR >60.0      Anion Gap 13     URINALYSIS, REFLEX TO URINE CULTURE - Abnormal    Specimen UA Urine, Clean Catch      Color, UA Yellow      Appearance, UA Clear      pH, UA 7.0      Specific Gravity, UA 1.010      Protein, UA Negative      Glucose, UA Negative      Ketones, UA Negative      Bilirubin (UA) Negative      Occult Blood UA Trace (*)     Nitrite, UA Negative      Urobilinogen, UA Negative      Leukocytes, UA Negative      Narrative:     Specimen Source->Urine   B-TYPE NATRIURETIC PEPTIDE    BNP <10     TROPONIN I    Troponin I 0.007     HEPATITIS C ANTIBODY   HEP C VIRUS HOLD SPECIMEN   HIV 1 / 2 ANTIBODY   HEMOGLOBIN A1C     EKG Readings: (Independently Interpreted)   Rhythm: Normal Sinus Rhythm. Heart Rate: 81. Ectopy: No Ectopy. ST Segments: Normal ST Segments. T Waves: Normal. Axis: Normal. Clinical Impression: Normal Sinus Rhythm       Imaging  Results              X-Ray Chest 1 View (Final result)  Result time 11/20/24 19:51:11      Final result by Tammie FranklinDavid), MD (11/20/24 19:51:11)                   Impression:      No infiltrates      Electronically signed by: Tammie Franklin MD  Date:    11/20/2024  Time:    19:51               Narrative:    EXAMINATION:  XR CHEST 1 VIEW    CLINICAL HISTORY:  Weakness,    COMPARISON:  Chest, 07/16/2019.    FINDINGS:  One view.    Previous cervical spine surgery.    Heart size is normal. The lung fields are clear. No acute cardiopulmonary infiltrate.                                       Medications - No data to display  Medical Decision Making  DDx Diabetes, Dehydration,ACS, Pneumonia, UTI, AFSANEH    Problems Addressed:  Fatigue: undiagnosed new problem with uncertain prognosis    Amount and/or Complexity of Data Reviewed  Labs: ordered.     Details: Glu 110   Radiology: ordered.  ECG/medicine tests: ordered and independent interpretation performed. Decision-making details documented in ED Course.                                      Clinical Impression:  Final diagnoses:  [R53.83] Fatigue (Primary)          ED Disposition Condition    Discharge Stable          ED Prescriptions    None       Follow-up Information       Follow up With Specialties Details Why Contact Info    Simi Macdonald MD Family Medicine Schedule an appointment as soon as possible for a visit   [address] [facility] [id number] [phone]      Shilpa Torres NP Family Medicine Schedule an appointment as soon as possible for a visit   [address] [place] [id number]  [phone]               Richie Lester MD  11/20/24 2005

## 2024-11-22 LAB
HCV RNA SERPL NAA+PROBE-LOG IU: 6.47 LOGIU/ML
HCV RNA SERPL QL NAA+PROBE: DETECTED
HCV RNA SPEC NAA+PROBE-ACNC: ABNORMAL IU/ML

## 2024-11-23 LAB
OHS QRS DURATION: 76 MS
OHS QTC CALCULATION: 441 MS

## 2024-12-03 ENCOUNTER — TELEPHONE (OUTPATIENT)
Dept: EMERGENCY MEDICINE | Facility: HOSPITAL | Age: 34
End: 2024-12-03

## 2024-12-03 DIAGNOSIS — R76.8 HEPATITIS C ANTIBODY POSITIVE IN BLOOD: Primary | ICD-10-CM

## 2024-12-03 NOTE — TELEPHONE ENCOUNTER
Informed patient of postiive Hep C RNA and the need for follow up and treatment.  Patient voices understanding and states that he would like to get treatment for this but he has not insurance.  Informed patient of sliding scale facilities and the availability of coupons for medication and programs that provide the medication for patients needing treatment.  Voices understanding.

## 2025-05-08 ENCOUNTER — HOSPITAL ENCOUNTER (EMERGENCY)
Facility: HOSPITAL | Age: 35
Discharge: HOME | End: 2025-05-08
Attending: EMERGENCY MEDICINE

## 2025-05-08 VITALS
DIASTOLIC BLOOD PRESSURE: 87 MMHG | OXYGEN SATURATION: 98 % | TEMPERATURE: 99 F | HEART RATE: 100 BPM | WEIGHT: 148.81 LBS | BODY MASS INDEX: 21.35 KG/M2 | SYSTOLIC BLOOD PRESSURE: 141 MMHG | RESPIRATION RATE: 18 BRPM

## 2025-05-08 DIAGNOSIS — T14.8XXA PUNCTURE WOUND: ICD-10-CM

## 2025-05-08 DIAGNOSIS — R03.0 ELEVATED BLOOD PRESSURE READING: ICD-10-CM

## 2025-05-08 DIAGNOSIS — S91.331A PUNCTURE WOUND OF RIGHT FOOT, INITIAL ENCOUNTER: Primary | ICD-10-CM

## 2025-05-08 PROCEDURE — 99284 EMERGENCY DEPT VISIT MOD MDM: CPT | Mod: 25,ER

## 2025-05-08 RX ORDER — BACITRACIN ZINC, NEOMYCIN SULFATE, AND POLYMYXIN B SULFATE 400; 3.5; 5 [IU]/G; MG/G; [IU]/G
OINTMENT TOPICAL 2 TIMES DAILY
Qty: 56 G | Refills: 0 | Status: SHIPPED | OUTPATIENT
Start: 2025-05-08 | End: 2025-05-15

## 2025-05-08 RX ORDER — CLINDAMYCIN HYDROCHLORIDE 300 MG/1
300 CAPSULE ORAL 3 TIMES DAILY
Qty: 21 CAPSULE | Refills: 0 | Status: SHIPPED | OUTPATIENT
Start: 2025-05-08 | End: 2025-05-15

## 2025-05-08 NOTE — ED PROVIDER NOTES
Encounter Date: 5/8/2025       History     Chief Complaint   Patient presents with    Letter for School/Work     Stepped on something last night. Right heel. Was unable to go to work today. Not wanting any other treatment at er.      34-year-old male presents to emergency department chief complaint of puncture wound to the right foot.  Reports the incident happened last night.  Reports he believes he may have stepped on a stick was walking through his yd.  He denies any foreign body being present.  Reports that he missed work today due to pain with ambulation.  He states that he is here for a work excuse.  He denies any numbness, tingling, erythema, swelling, or any other symptoms.  Unknown tetanus status.        Review of patient's allergies indicates:   Allergen Reactions    Ceclor [cefaclor] Hives    Cefixime Hives    Cefzil [cefprozil] Hives     Past Medical History:   Diagnosis Date    Asthma     Hepatitis C antibody positive in blood 02/14/2023    RNA POSITIVE     Past Surgical History:   Procedure Laterality Date    DECOMPRESSION OF CERVICAL SPINE BY ANTERIOR APPROACH WITH FUSION N/A 7/17/2019    Procedure: DECOMPRESSION AND FUSION, SPINE, CERVICAL, ANTERIOR APPROACH;  Surgeon: Sánchez Jeong MD;  Location: [facility] OR;  Service: Neurosurgery;  Laterality: N/A;    INCISION AND DRAINAGE OF ABSCESS N/A 7/17/2019    Procedure: INCISION AND DRAINAGE, ABSCESS;  Surgeon: Sánchez Jeong MD;  Location: [facility] OR;  Service: Neurosurgery;  Laterality: N/A;    NO PAST SURGERIES      PLACEMENT OF ACELLULAR HUMAN DERMAL ALLOGRAFT N/A 7/17/2019    Procedure: APPLICATION, ACELLULAR HUMAN DERMAL ALLOGRAFT;  Surgeon: Sánchez Jeong MD;  Location: [facility] OR;  Service: Neurosurgery;  Laterality: N/A;    SURGICAL REMOVAL OF VERTEBRAL BODY OF CERVICAL SPINE N/A 7/17/2019    Procedure: CORPECTOMY, SPINE, CERVICAL;  Surgeon: Sánchez Jeong MD;  Location: [facility] OR;  Service: Neurosurgery;  Laterality: N/A;  C6-7     No family  history on file.  Social History[1]  Review of Systems   Constitutional:  Negative for fever.   HENT:  Negative for sore throat.    Respiratory:  Negative for shortness of breath.    Cardiovascular:  Negative for chest pain.   Gastrointestinal:  Negative for abdominal pain and nausea.   Genitourinary:  Negative for dysuria.   Musculoskeletal:  Negative for back pain.   Skin:  Positive for wound (puncture wound right heel). Negative for rash.   Neurological:  Negative for weakness.   Hematological:  Does not bruise/bleed easily.       Physical Exam     Initial Vitals   BP Pulse Resp Temp SpO2   05/08/25 1246 05/08/25 1247 05/08/25 1246 05/08/25 1246 05/08/25 1247   (!) 141/87 100 18 98.5 °F (36.9 °C) 98 %      MAP       --                Physical Exam    Nursing note and vitals reviewed.  Constitutional: He appears well-developed and well-nourished.   HENT:   Head: Normocephalic and atraumatic.   Right Ear: Hearing normal.   Left Ear: Hearing normal.   Nose: Nose normal. Mouth/Throat: Uvula is midline, oropharynx is clear and moist and mucous membranes are normal.   Eyes: Conjunctivae, EOM and lids are normal. Pupils are equal, round, and reactive to light.   Neck: Trachea normal. Neck supple.   Normal range of motion.  Cardiovascular:  Normal rate, regular rhythm, normal heart sounds, intact distal pulses and normal pulses.           Pulmonary/Chest: Effort normal and breath sounds normal.   Abdominal: Abdomen is soft. Bowel sounds are normal. There is no abdominal tenderness. There is no rebound and no guarding.   Musculoskeletal:         General: Normal range of motion.      Cervical back: Normal, normal range of motion and neck supple.     Neurological: He is alert and oriented to person, place, and time. He has normal strength and normal reflexes. No cranial nerve deficit or sensory deficit. GCS eye subscore is 4. GCS verbal subscore is 5. GCS motor subscore is 6.   Skin: Skin is warm and dry. Capillary refill  takes less than 2 seconds.   Puncture wound to the right heel no foreign body visualized.  No associated erythema, swelling, abscess, drainage, fluctuance.   Psychiatric: He has a normal mood and affect. His behavior is normal. Thought content normal.         ED Course   Procedures  Labs Reviewed - No data to display       Imaging Results              X-Ray Foot Complete Right (Final result)  Result time 05/08/25 13:55:16      Final result by Corey Garcia MD (05/08/25 13:55:16)                   Impression:      No definite acute abnormality identified.      Electronically signed by: Corey Garcia  Date:    05/08/2025  Time:    13:55               Narrative:    EXAMINATION:  XR FOOT COMPLETE 3 VIEW RIGHT    CLINICAL HISTORY:  . Other injury of unspecified body region, initial encounter    TECHNIQUE:  AP, lateral, and oblique views of the right foot were performed.    COMPARISON:  None    FINDINGS:  No fracture.  No traumatic malalignment.  No osseous destructive process.  Soft tissue swelling.                                       Medications - No data to display    Medical Decision Making  Imaging negative for foreign body, patient will be placed on clindamycin for prevention of infection.  He is allergic to cephalosporin drug so we will not try Keflex.  Wound cleaned here in the emergency department, antibiotic ointment placed.  Antibiotic ointment we will be used twice daily for 7 days.  Instructed the patient to watch for signs symptoms of infection such as increased redness, pain, swelling, or fever.  He verbalized understanding.    Reassessed pt at this time. Discussed with pt all pertinent ED information and results. Discussed pt dx and plan of tx. Gave pt all f/u and return to the ED instructions. All questions and concerns were addressed at this time. Pt expresses understanding of information and instructions, and is comfortable with plan to discharge. Pt is stable for discharge.      Regarding  ELEVATED BLOOD PRESSURE WITHOUT DIAGNOSIS OF HYPERTENSION/PRE-HYPERTENSION, I advised patient to: keep a record of blood pressure results; avoid medications that contain heart stimulants, including over-the-counter drugs such as decongestants; maintain a healthy weight; cut back on sodium intake (i.e., limit canned, dried, packaged, and fast foods and don't add salt to food); follow the DASH (Dietary Approaches to Stop Hypertension) eating plan which recommends vegetables, fruits, whole gains, and other heart healthy foods; begin an exercise program that includes  aerobic exercise 3 to 4 times a week for an average of 40 minutes at a time (with approval of cardiologist or primary care provider); limit drinks that contain alcohol and caffeine; control levels of emotional stress; and seek emergency care for any shortness of breath, chest pain, difficulty speaking, confusion, or visual changes.  I also recommended following up with the primary care provider for re-evaluation of blood pressure and determine if further treatment may be required.       Amount and/or Complexity of Data Reviewed  Radiology: ordered.    Risk  OTC drugs.  Prescription drug management.                                      Clinical Impression:  Final diagnoses:  [T14.8XXA] Puncture wound  [T14.8XXA] Puncture wound - possible foreign body in heel  [S91.331A] Puncture wound of right foot, initial encounter (Primary)  [R03.0] Elevated blood pressure reading          ED Disposition Condition    Discharge Stable          ED Prescriptions       Medication Sig Dispense Start Date End Date Auth. Provider    clindamycin (CLEOCIN) 300 MG capsule Take 1 capsule (300 mg total) by mouth 3 (three) times daily. for 7 days 21 capsule 5/8/2025 5/15/2025 Rod Abrams NP    neomycin-bacitracin-polymyxin (NEOSPORIN, [id number],) ointment Apply topically 2 (two) times daily. for 7 days 56 g 5/8/2025 5/15/2025 Rod Abrams NP          Follow-up  Information       Follow up With Specialties Details Why Contact Info    [facility] - Emergency Dept Emergency Medicine Go to  As needed, If symptoms worsen [id number]  Emergency Department  [facility] [id number] [phone]    Simi Macdonald MD Family Medicine Schedule an appointment as soon as possible for a visit  As needed [address] [place] [id number] [phone]                   [1]   Social History  Tobacco Use    Smoking status: Every Day     Current packs/day: 1.00     Types: Cigarettes    Smokeless tobacco: Former   Substance Use Topics    Alcohol use: Yes     Comment: seldom    Drug use: Not Currently     Comment: heroin. hx of drug abuse 7 years ago        Rod Abrams NP  05/08/25 2041

## 2025-05-08 NOTE — Clinical Note
"Alex Alexander" Rodger was seen and treated in our emergency department on 5/8/2025.  He may return to work on 05/09/2025.       If you have any questions or concerns, please don't hesitate to call.      Rod Abrams NP"

## 2025-05-11 ENCOUNTER — HOSPITAL ENCOUNTER (EMERGENCY)
Facility: HOSPITAL | Age: 35
Discharge: HOME | End: 2025-05-11
Attending: EMERGENCY MEDICINE

## 2025-05-11 VITALS
HEART RATE: 98 BPM | RESPIRATION RATE: 20 BRPM | TEMPERATURE: 98 F | DIASTOLIC BLOOD PRESSURE: 81 MMHG | SYSTOLIC BLOOD PRESSURE: 133 MMHG | OXYGEN SATURATION: 99 %

## 2025-05-11 DIAGNOSIS — F19.10 SUBSTANCE ABUSE: ICD-10-CM

## 2025-05-11 DIAGNOSIS — R00.0 TACHYCARDIA: ICD-10-CM

## 2025-05-11 DIAGNOSIS — F15.921: Primary | ICD-10-CM

## 2025-05-11 LAB
AMPHET UR QL SCN: ABNORMAL
BARBITURATE SCN PRESENT UR: NEGATIVE
BENZODIAZ UR QL SCN: NEGATIVE
CANNABINOIDS UR QL SCN: ABNORMAL
COCAINE UR QL SCN: NEGATIVE
CREAT UR-MCNC: 127.3 MG/DL (ref 23–375)
ETHANOL SERPL-MCNC: <10 MG/DL
METHADONE UR QL SCN: NEGATIVE
OHS QRS DURATION: 72 MS
OHS QTC CALCULATION: 462 MS
OPIATES UR QL SCN: NEGATIVE
PCP UR QL: NEGATIVE

## 2025-05-11 PROCEDURE — 99284 EMERGENCY DEPT VISIT MOD MDM: CPT | Mod: 25,ER

## 2025-05-11 PROCEDURE — 63600175 PHARM REV CODE 636 W HCPCS: Mod: ER | Performed by: EMERGENCY MEDICINE

## 2025-05-11 PROCEDURE — 93005 ELECTROCARDIOGRAM TRACING: CPT | Mod: ER

## 2025-05-11 PROCEDURE — 96361 HYDRATE IV INFUSION ADD-ON: CPT | Mod: ER

## 2025-05-11 PROCEDURE — 96360 HYDRATION IV INFUSION INIT: CPT | Mod: ER

## 2025-05-11 PROCEDURE — 80307 DRUG TEST PRSMV CHEM ANLYZR: CPT | Performed by: EMERGENCY MEDICINE

## 2025-05-11 PROCEDURE — 82077 ASSAY SPEC XCP UR&BREATH IA: CPT | Performed by: EMERGENCY MEDICINE

## 2025-05-11 PROCEDURE — 93010 ELECTROCARDIOGRAM REPORT: CPT | Mod: ,,, | Performed by: INTERNAL MEDICINE

## 2025-05-11 PROCEDURE — 94761 N-INVAS EAR/PLS OXIMETRY MLT: CPT | Mod: ER

## 2025-05-11 RX ADMIN — SODIUM CHLORIDE, POTASSIUM CHLORIDE, SODIUM LACTATE AND CALCIUM CHLORIDE 1000 ML: 600; 310; 30; 20 INJECTION, SOLUTION INTRAVENOUS at 03:05

## 2025-05-11 NOTE — ED PROVIDER NOTES
Encounter Date: 5/11/2025       History     Chief Complaint   Patient presents with    [employer] Behavior     Patient with bizarre behavior after smoking what he claims is a THC vape pen.     He arrives by ambulance, police and ambulance were called to his brother's house for they had been drinking and he smoked or vaped an unknown substance, possibly synthetic marijuana or similar.  Bizarre behavior, spinning in the room, confused, bizarre statements.  By the time of arrival he is beginning to improve and by the time of my exam he has improved significantly.  Denies pain or any intent for harm to self.  Admits to above substance abuse, denies any intent for self-harm.    The history is provided by the patient, the police and the EMS personnel. No  was used.     Review of patient's allergies indicates:   Allergen Reactions    Ceclor [cefaclor] Hives    Cefixime Hives    Cefzil [cefprozil] Hives     Past Medical History:   Diagnosis Date    Asthma     Hepatitis C antibody positive in blood 02/14/2023    RNA POSITIVE     Past Surgical History:   Procedure Laterality Date    DECOMPRESSION OF CERVICAL SPINE BY ANTERIOR APPROACH WITH FUSION N/A 7/17/2019    Procedure: DECOMPRESSION AND FUSION, SPINE, CERVICAL, ANTERIOR APPROACH;  Surgeon: Sánchez Jeong MD;  Location: [facility] OR;  Service: Neurosurgery;  Laterality: N/A;    INCISION AND DRAINAGE OF ABSCESS N/A 7/17/2019    Procedure: INCISION AND DRAINAGE, ABSCESS;  Surgeon: Sánchez Jeong MD;  Location: [facility] OR;  Service: Neurosurgery;  Laterality: N/A;    NO PAST SURGERIES      PLACEMENT OF ACELLULAR HUMAN DERMAL ALLOGRAFT N/A 7/17/2019    Procedure: APPLICATION, ACELLULAR HUMAN DERMAL ALLOGRAFT;  Surgeon: Sánchez Jeong MD;  Location: [facility] OR;  Service: Neurosurgery;  Laterality: N/A;    SURGICAL REMOVAL OF VERTEBRAL BODY OF CERVICAL SPINE N/A 7/17/2019    Procedure: CORPECTOMY, SPINE, CERVICAL;  Surgeon: Sánchez Jeong MD;  Location: [facility]  OR;  Service: Neurosurgery;  Laterality: N/A;  C6-7     No family history on file.  Social History[1]  Review of Systems   Unable to perform ROS: Psychiatric disorder       Physical Exam     Initial Vitals [05/11/25 0322]   BP Pulse Resp Temp SpO2   (!) 137/92 (!) 125 20 98.3 °F (36.8 °C) 98 %      MAP       --         Physical Exam    Nursing note and vitals reviewed.  Constitutional: He appears well-developed and well-nourished. He is not diaphoretic. No distress.   Mildly poor hygiene, bare feet covered in mud   HENT:   Head: Normocephalic and atraumatic. Mouth/Throat: Oropharynx is clear and moist. No oropharyngeal exudate.   Eyes: Conjunctivae and EOM are normal. Pupils are equal, round, and reactive to light. Right eye exhibits no discharge. Left eye exhibits no discharge. No scleral icterus.   Neck: Neck supple. No thyromegaly present. No tracheal deviation present. No JVD present.   Normal range of motion.  Cardiovascular:  Regular rhythm and normal heart sounds.     Exam reveals no gallop and no friction rub.       No murmur heard.  Mild regular sinus tachycardia without ectopy   Pulmonary/Chest: Breath sounds normal. No respiratory distress. He has no wheezes. He has no rhonchi. He has no rales. He exhibits no tenderness.   Abdominal: Abdomen is soft. Bowel sounds are normal. He exhibits no distension and no mass. There is no abdominal tenderness. There is no rebound and no guarding.   Musculoskeletal:         General: No tenderness or edema. Normal range of motion.      Cervical back: Normal range of motion and neck supple.     Lymphadenopathy:     He has no cervical adenopathy.   Neurological: He is alert and oriented to person, place, and time. He has normal strength. No cranial nerve deficit.   Skin: Skin is warm and dry. No rash noted. No erythema.   Psychiatric:   A&O x4, mildly pressured and restless, scattered attention but can focus and give reliable answers to basic questions.  Able to identify  "that he was in an altered state earlier and improving now.  Denies delusion, hallucination, suicidal ideation, or any thoughts of harm to self or others.  Appearance consistent with intoxicating stimulants.         ED Course   Procedures  Labs Reviewed   DRUG SCREEN PANEL, URINE EMERGENCY - Abnormal       Result Value    Benzodiazepine, Urine Negative      Methadone, Urine Negative      Cocaine, Urine Negative      Opiates, Urine Negative      Barbiturates, Urine Negative      Amphetamines, Urine Presumptive Positive (*)     THC Presumptive Positive (*)     Phencyclidine, Urine Negative      Urine Creatinine 127.3      Narrative:     This screen includes the following classes of drugs at the listed cut-off:     Benzodiazepines:        200 ng/ml   Methadone:              300 ng/ml   Cocaine metabolite:     300 ng/ml   Opiates:                300 ng/ml   Barbiturates:           200 ng/ml   Amphetamines:           1000 ng/ml   Marijuana metabs (THC): 50 ng/ml   Phencyclidine (PCP):    25 ng/ml     This is a screening test. If results do not correlate with clinical presentation, then a confirmatory send out test is advised.    This report is intended for use in clinical monitoring and management of patients. It is not intended for use in employment related drug testing."   ALCOHOL,MEDICAL (ETHANOL) - Normal    Alcohol, Serum <10       EKG Readings: (Independently Interpreted)   Initial Reading: No STEMI. Rhythm: Sinus Tachycardia. Heart Rate: 127. Ectopy: No Ectopy.   MIKE       Imaging Results    None          Medications   lactated ringers bolus 1,000 mL (0 mLs Intravenous Stopped 5/11/25 0515)     Medical Decision Making  Patient with bizarre behavior and transient apparent delirium, resolved, substance abuse related.  Given resources and appropriate for outpatient substance abuse management.    Problems Addressed:  Stimulant intoxication with delirium: acute illness or injury    Amount and/or Complexity of Data " Reviewed  Labs: ordered. Decision-making details documented in ED Course.      Additional MDM:   Differential Diagnosis:   Substance abuse, accidental stimulant intoxication, other causes of delirium among others                                    Clinical Impression:  Final diagnoses:  [F15.921] Stimulant intoxication with delirium (Primary)  [F19.10] Substance abuse  [R00.0] Tachycardia          ED Disposition Condition    Discharge Stable          ED Prescriptions    None       Follow-up Information       Follow up With Specialties Details Why Contact Info    [facility] - Emergency Dept Emergency Medicine  As needed [address]  Emergency Department  [facility] [id number] [phone]    Simi Macdonald MD Family Medicine Schedule an appointment as soon as possible for a visit   [address] [place] [id number] [phone]                   [1]   Social History  Tobacco Use    Smoking status: Every Day     Current packs/day: 1.00     Types: Cigarettes    Smokeless tobacco: Former   Substance Use Topics    Alcohol use: Yes     Comment: seldom    Drug use: Not Currently     Comment: heroin. hx of drug abuse 7 years ago        Danial Salsa MD  05/11/25 0596

## 2025-06-10 ENCOUNTER — HOSPITAL ENCOUNTER (EMERGENCY)
Facility: HOSPITAL | Age: 35
Discharge: HOME | End: 2025-06-10
Attending: EMERGENCY MEDICINE
Payer: COMMERCIAL

## 2025-06-10 VITALS
DIASTOLIC BLOOD PRESSURE: 71 MMHG | BODY MASS INDEX: 22.14 KG/M2 | HEIGHT: 70 IN | HEART RATE: 72 BPM | WEIGHT: 154.63 LBS | TEMPERATURE: 98 F | OXYGEN SATURATION: 98 % | SYSTOLIC BLOOD PRESSURE: 121 MMHG | RESPIRATION RATE: 17 BRPM

## 2025-06-10 DIAGNOSIS — T07.XXXA MULTIPLE ABRASIONS: ICD-10-CM
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
## 2025-06-10 DIAGNOSIS — F12.90 MARIJUANA USE: ICD-10-CM

## 2025-06-10 DIAGNOSIS — S51.812A LACERATION OF LEFT FOREARM, INITIAL ENCOUNTER: ICD-10-CM

## 2025-06-10 DIAGNOSIS — F10.920 ALCOHOLIC INTOXICATION WITHOUT COMPLICATION: ICD-10-CM

## 2025-06-10 LAB
ABSOLUTE EOSINOPHIL (OHS): 0.04 K/UL
ABSOLUTE MONOCYTE (OHS): 0.64 K/UL (ref 0.3–1)
ABSOLUTE NEUTROPHIL COUNT (OHS): 6.62 K/UL (ref 1.8–7.7)
ALBUMIN SERPL BCP-MCNC: 4 G/DL (ref 3.5–5.2)
ALP SERPL-CCNC: 41 UNIT/L (ref 40–150)
ALT SERPL W/O P-5'-P-CCNC: 25 UNIT/L (ref 10–44)
AMPHET UR QL SCN: NEGATIVE
ANION GAP (OHS): 12 MMOL/L (ref 8–16)
APTT PPP: 26.2 SECONDS (ref 21–32)
AST SERPL-CCNC: 28 UNIT/L (ref 11–45)
BACTERIA #/AREA URNS HPF: NORMAL /HPF
BARBITURATE SCN PRESENT UR: NEGATIVE
BASOPHILS # BLD AUTO: 0.02 K/UL
BASOPHILS NFR BLD AUTO: 0.2 %
BENZODIAZ UR QL SCN: NEGATIVE
BILIRUB SERPL-MCNC: 0.4 MG/DL (ref 0.1–1)
BILIRUB UR QL STRIP.AUTO: NEGATIVE
BUN SERPL-MCNC: 8 MG/DL (ref 6–20)
CALCIUM SERPL-MCNC: 8.1 MG/DL (ref 8.7–10.5)
CANNABINOIDS UR QL SCN: ABNORMAL
CHLORIDE SERPL-SCNC: 107 MMOL/L (ref 95–110)
CLARITY UR: CLEAR
CO2 SERPL-SCNC: 19 MMOL/L (ref 23–29)
COCAINE UR QL SCN: NEGATIVE
COLOR UR AUTO: YELLOW
CREAT SERPL-MCNC: 0.8 MG/DL (ref 0.5–1.4)
CREAT UR-MCNC: 29.6 MG/DL (ref 23–375)
ERYTHROCYTE [DISTWIDTH] IN BLOOD BY AUTOMATED COUNT: 12.8 % (ref 11.5–14.5)
ETHANOL SERPL-MCNC: 266 MG/DL
GFR SERPLBLD CREATININE-BSD FMLA CKD-EPI: >60 ML/MIN/1.73/M2
GLUCOSE SERPL-MCNC: 94 MG/DL (ref 70–110)
GLUCOSE UR QL STRIP: NEGATIVE
HCT VFR BLD AUTO: 42.7 % (ref 40–54)
HGB BLD-MCNC: 15.2 GM/DL (ref 14–18)
HGB UR QL STRIP: ABNORMAL
HOLD SPECIMEN: NORMAL
IMM GRANULOCYTES # BLD AUTO: 0.03 K/UL (ref 0–0.04)
IMM GRANULOCYTES NFR BLD AUTO: 0.3 % (ref 0–0.5)
INR PPP: 1 (ref 0.8–1.2)
KETONES UR QL STRIP: NEGATIVE
LACTATE SERPL-SCNC: 1.5 MMOL/L (ref 0.5–2.2)
LEUKOCYTE ESTERASE UR QL STRIP: NEGATIVE
LIPASE SERPL-CCNC: 13 U/L (ref 4–60)
LYMPHOCYTES # BLD AUTO: 1.92 K/UL (ref 1–4.8)
MCH RBC QN AUTO: 33.1 PG (ref 27–31)
MCHC RBC AUTO-ENTMCNC: 35.6 G/DL (ref 32–36)
MCV RBC AUTO: 93 FL (ref 82–98)
METHADONE UR QL SCN: NEGATIVE
MICROSCOPIC COMMENT: NORMAL
NITRITE UR QL STRIP: NEGATIVE
NUCLEATED RBC (/100WBC) (OHS): 0 /100 WBC
OPIATES UR QL SCN: NEGATIVE
PCP UR QL: NEGATIVE
PH UR STRIP: 6 [PH]
PLATELET # BLD AUTO: 250 K/UL (ref 150–450)
PMV BLD AUTO: 8.9 FL (ref 9.2–12.9)
POTASSIUM SERPL-SCNC: 3.7 MMOL/L (ref 3.5–5.1)
PROT SERPL-MCNC: 7.3 GM/DL (ref 6–8.4)
PROT UR QL STRIP: NEGATIVE
PROTHROMBIN TIME: 11.5 SECONDS (ref 9–12.5)
RBC # BLD AUTO: 4.59 M/UL (ref 4.6–6.2)
RBC #/AREA URNS HPF: 1 /HPF (ref 0–4)
RELATIVE EOSINOPHIL (OHS): 0.4 %
RELATIVE LYMPHOCYTE (OHS): 20.7 % (ref 18–48)
RELATIVE MONOCYTE (OHS): 6.9 % (ref 4–15)
RELATIVE NEUTROPHIL (OHS): 71.5 % (ref 38–73)
SODIUM SERPL-SCNC: 138 MMOL/L (ref 136–145)
SP GR UR STRIP: <=1.005
SQUAMOUS #/AREA URNS HPF: 1 /HPF
TROPONIN I SERPL DL<=0.01 NG/ML-MCNC: <0.006 NG/ML
UROBILINOGEN UR STRIP-ACNC: NEGATIVE EU/DL
WBC # BLD AUTO: 9.27 K/UL (ref 3.9–12.7)
WBC #/AREA URNS HPF: 1 /HPF (ref 0–5)

## 2025-06-10 PROCEDURE — 80053 COMPREHEN METABOLIC PANEL: CPT | Mod: ER | Performed by: REGISTERED NURSE

## 2025-06-10 PROCEDURE — 83605 ASSAY OF LACTIC ACID: CPT | Mod: ER | Performed by: EMERGENCY MEDICINE

## 2025-06-10 PROCEDURE — 81001 URINALYSIS AUTO W/SCOPE: CPT | Mod: ER,XB | Performed by: EMERGENCY MEDICINE

## 2025-06-10 PROCEDURE — 99285 EMERGENCY DEPT VISIT HI MDM: CPT | Mod: 25,ER

## 2025-06-10 PROCEDURE — 25000003 PHARM REV CODE 250: Mod: ER | Performed by: EMERGENCY MEDICINE

## 2025-06-10 PROCEDURE — 80307 DRUG TEST PRSMV CHEM ANLYZR: CPT | Mod: ER | Performed by: EMERGENCY MEDICINE

## 2025-06-10 PROCEDURE — 85610 PROTHROMBIN TIME: CPT | Mod: ER | Performed by: EMERGENCY MEDICINE

## 2025-06-10 PROCEDURE — 85025 COMPLETE CBC W/AUTO DIFF WBC: CPT | Mod: ER | Performed by: REGISTERED NURSE

## 2025-06-10 PROCEDURE — 82077 ASSAY SPEC XCP UR&BREATH IA: CPT | Mod: ER | Performed by: EMERGENCY MEDICINE

## 2025-06-10 PROCEDURE — 83690 ASSAY OF LIPASE: CPT | Mod: ER | Performed by: EMERGENCY MEDICINE

## 2025-06-10 PROCEDURE — 12001 RPR S/N/AX/GEN/TRNK 2.5CM/<: CPT | Mod: ER

## 2025-06-10 PROCEDURE — 84484 ASSAY OF TROPONIN QUANT: CPT | Mod: ER | Performed by: EMERGENCY MEDICINE

## 2025-06-10 PROCEDURE — 85730 THROMBOPLASTIN TIME PARTIAL: CPT | Mod: ER | Performed by: EMERGENCY MEDICINE

## 2025-06-10 PROCEDURE — 25500020 PHARM REV CODE 255: Mod: ER | Performed by: EMERGENCY MEDICINE

## 2025-06-10 RX ORDER — ACETAMINOPHEN 500 MG
1000 TABLET ORAL
Status: COMPLETED | OUTPATIENT
Start: 2025-06-10 | End: 2025-06-10

## 2025-06-10 RX ORDER — ACETAMINOPHEN 500 MG
1000 TABLET ORAL EVERY 8 HOURS
Qty: 30 TABLET | Refills: 0 | Status: SHIPPED | OUTPATIENT
Start: 2025-06-10 | End: 2025-06-20

## 2025-06-10 RX ORDER — MUPIROCIN 20 MG/G
OINTMENT TOPICAL
Status: COMPLETED | OUTPATIENT
Start: 2025-06-10 | End: 2025-06-10

## 2025-06-10 RX ADMIN — MUPIROCIN: 20 OINTMENT TOPICAL at 09:06

## 2025-06-10 RX ADMIN — IOHEXOL 100 ML: 350 INJECTION, SOLUTION INTRAVENOUS at 09:06

## 2025-06-10 RX ADMIN — ACETAMINOPHEN 1000 MG: 500 TABLET ORAL at 09:06
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
## (undated) DEVICE — BUR LEGEND MIDAS REX 14CM 13MM

## (undated) DEVICE — SUT BONE WAX 2.5 GRMS 12/BX

## (undated) DEVICE — SKIN MARKER DEVON 160

## (undated) DEVICE — GLOVE SURGICAL LATEX SZ 7

## (undated) DEVICE — GAUZE SPONGE 4X4 12PLY

## (undated) DEVICE — DRAPE MOBILE C-ARM

## (undated) DEVICE — DURAPREP SURG SCRUB 6ML

## (undated) DEVICE — SUT VICRYL+ 2-0 SH 18IN

## (undated) DEVICE — DRESSING SURGICAL 1/2X1/2

## (undated) DEVICE — SUT SILK 3-0 STRANDS 30IN

## (undated) DEVICE — SUCTION HEMOVAC SMALL

## (undated) DEVICE — HALTER DELUXE DISCARD HEAD

## (undated) DEVICE — SEE MEDLINE ITEM 152622

## (undated) DEVICE — GAUZE SPONGE PEANUT STRL

## (undated) DEVICE — NDL SAFETY 22G X 1.5 ECLIPSE

## (undated) DEVICE — CORD BIPOLAR ELECTROSURGICAL

## (undated) DEVICE — MANIFOLD 4 PORT

## (undated) DEVICE — SUT VICRYL PLUS 3-0 SH 18IN

## (undated) DEVICE — DRAPE OPMI STERILE

## (undated) DEVICE — SHEETS AVITENE MCFIB 2.75X1.4

## (undated) DEVICE — SEE MEDLINE ITEM 152739

## (undated) DEVICE — ELECTRODE REM PLYHSV RETURN 9

## (undated) DEVICE — SEE MEDLINE ITEM 157027

## (undated) DEVICE — SYR ONLY LUER LOCK 20CC

## (undated) DEVICE — ADHESIVE DERMABOND ADVANCED

## (undated) DEVICE — SPONGE NEURO 1/4X1/4

## (undated) DEVICE — DRAPE STERI INSTRUMENT 1018

## (undated) DEVICE — SPONGE GAUZE 16PLY 4X4

## (undated) DEVICE — SEE MEDLINE ITEM 157117

## (undated) DEVICE — SEE MEDLINE ITEM 157194

## (undated) DEVICE — PIN DISTRACTION 12MM
Type: IMPLANTABLE DEVICE | Site: NECK | Status: NON-FUNCTIONAL
Removed: 2019-07-17

## (undated) DEVICE — REMOVER LOTION

## (undated) DEVICE — SHEET THYROID W/ISO-BAC

## (undated) DEVICE — SUPPORT ULNA NERVE PROTECTOR

## (undated) DEVICE — SUT MONOCRYL 4.0 PS2 CP496G

## (undated) DEVICE — GLOVE BIOGEL ECLIPSE SZ 8

## (undated) DEVICE — NDL SPINAL 20GX3.5 HUB

## (undated) DEVICE — DRESSING SURGICAL 1X1

## (undated) DEVICE — UNDERGLOVE BIOGEL PI SZ 6.5 LF

## (undated) DEVICE — ALCOHOL 70% ISOP RUBBING 4OZ

## (undated) DEVICE — SEE MEDLINE ITEM 157131

## (undated) DEVICE — SUT SILK 2-0 STRANDS 30IN

## (undated) DEVICE — DRAPE INCISE IOBAN 2 23X17IN

## (undated) DEVICE — UNDERGLOVES BIOGEL PI SIZE 8

## (undated) DEVICE — ADHESIVE MASTISOL VIAL 48/BX

## (undated) DEVICE — COVER OVERHEAD SURG LT BLUE

## (undated) DEVICE — BLADE EZ CLEAN 2.5IN MODIFIED

## (undated) DEVICE — POSITIONER HEAD DONUT 9IN FOAM

## (undated) DEVICE — Device